# Patient Record
Sex: MALE | Race: WHITE | Employment: OTHER | URBAN - METROPOLITAN AREA
[De-identification: names, ages, dates, MRNs, and addresses within clinical notes are randomized per-mention and may not be internally consistent; named-entity substitution may affect disease eponyms.]

---

## 2017-03-06 ENCOUNTER — APPOINTMENT (OUTPATIENT)
Dept: GENERAL RADIOLOGY | Age: 62
End: 2017-03-06
Payer: MEDICARE

## 2017-03-06 ENCOUNTER — HOSPITAL ENCOUNTER (EMERGENCY)
Age: 62
Discharge: HOME | End: 2017-03-06
Attending: EMERGENCY MEDICINE
Payer: MEDICARE

## 2017-03-06 VITALS
OXYGEN SATURATION: 100 % | HEIGHT: 72 IN | RESPIRATION RATE: 18 BRPM | TEMPERATURE: 97.8 F | HEART RATE: 87 BPM | BODY MASS INDEX: 22.1 KG/M2 | DIASTOLIC BLOOD PRESSURE: 103 MMHG | SYSTOLIC BLOOD PRESSURE: 175 MMHG | WEIGHT: 163.14 LBS

## 2017-03-06 DIAGNOSIS — M54.50 CHRONIC LOW BACK PAIN WITHOUT SCIATICA, UNSPECIFIED BACK PAIN LATERALITY: ICD-10-CM

## 2017-03-06 DIAGNOSIS — K59.00 CONSTIPATION, UNSPECIFIED CONSTIPATION TYPE: Primary | ICD-10-CM

## 2017-03-06 DIAGNOSIS — N21.0 BLADDER STONES: ICD-10-CM

## 2017-03-06 DIAGNOSIS — G89.29 CHRONIC LOW BACK PAIN WITHOUT SCIATICA, UNSPECIFIED BACK PAIN LATERALITY: ICD-10-CM

## 2017-03-06 LAB
ALBUMIN SERPL BCP-MCNC: 3.5 G/DL (ref 3.2–4.6)
ALBUMIN/GLOB SERPL: 0.7 {RATIO} (ref 1.2–3.5)
ALP SERPL-CCNC: 91 U/L (ref 50–136)
ALT SERPL-CCNC: 25 U/L (ref 12–65)
ANION GAP BLD CALC-SCNC: 8 MMOL/L (ref 7–16)
AST SERPL W P-5'-P-CCNC: 17 U/L (ref 15–37)
BASOPHILS # BLD AUTO: 0 K/UL (ref 0–0.2)
BASOPHILS # BLD: 0 % (ref 0–2)
BILIRUB SERPL-MCNC: 0.4 MG/DL (ref 0.2–1.1)
BUN SERPL-MCNC: 13 MG/DL (ref 8–23)
CALCIUM SERPL-MCNC: 9.2 MG/DL (ref 8.3–10.4)
CHLORIDE SERPL-SCNC: 104 MMOL/L (ref 98–107)
CO2 SERPL-SCNC: 29 MMOL/L (ref 21–32)
CREAT SERPL-MCNC: 1.04 MG/DL (ref 0.8–1.5)
DIFFERENTIAL METHOD BLD: ABNORMAL
EOSINOPHIL # BLD: 0.2 K/UL (ref 0–0.8)
EOSINOPHIL NFR BLD: 2 % (ref 0.5–7.8)
ERYTHROCYTE [DISTWIDTH] IN BLOOD BY AUTOMATED COUNT: 13.5 % (ref 11.9–14.6)
GLOBULIN SER CALC-MCNC: 4.7 G/DL (ref 2.3–3.5)
GLUCOSE SERPL-MCNC: 99 MG/DL (ref 65–100)
HCT VFR BLD AUTO: 44.6 % (ref 41.1–50.3)
HGB BLD-MCNC: 14.3 G/DL (ref 13.6–17.2)
IMM GRANULOCYTES # BLD: 0 K/UL (ref 0–0.5)
IMM GRANULOCYTES NFR BLD AUTO: 0.1 % (ref 0–5)
LIPASE SERPL-CCNC: 239 U/L (ref 73–393)
LYMPHOCYTES # BLD AUTO: 17 % (ref 13–44)
LYMPHOCYTES # BLD: 1.4 K/UL (ref 0.5–4.6)
MCH RBC QN AUTO: 27.7 PG (ref 26.1–32.9)
MCHC RBC AUTO-ENTMCNC: 32.1 G/DL (ref 31.4–35)
MCV RBC AUTO: 86.4 FL (ref 79.6–97.8)
MONOCYTES # BLD: 1.1 K/UL (ref 0.1–1.3)
MONOCYTES NFR BLD AUTO: 14 % (ref 4–12)
NEUTS SEG # BLD: 5.4 K/UL (ref 1.7–8.2)
NEUTS SEG NFR BLD AUTO: 67 % (ref 43–78)
PLATELET # BLD AUTO: 343 K/UL (ref 150–450)
PMV BLD AUTO: 9.7 FL (ref 10.8–14.1)
POTASSIUM SERPL-SCNC: 4.2 MMOL/L (ref 3.5–5.1)
PROT SERPL-MCNC: 8.2 G/DL (ref 6.3–8.2)
RBC # BLD AUTO: 5.16 M/UL (ref 4.23–5.67)
SODIUM SERPL-SCNC: 141 MMOL/L (ref 136–145)
WBC # BLD AUTO: 8 K/UL (ref 4.3–11.1)

## 2017-03-06 PROCEDURE — 80053 COMPREHEN METABOLIC PANEL: CPT

## 2017-03-06 PROCEDURE — 83690 ASSAY OF LIPASE: CPT

## 2017-03-06 PROCEDURE — 99284 EMERGENCY DEPT VISIT MOD MDM: CPT | Performed by: EMERGENCY MEDICINE

## 2017-03-06 PROCEDURE — 85025 COMPLETE CBC W/AUTO DIFF WBC: CPT

## 2017-03-06 PROCEDURE — 74011250637 HC RX REV CODE- 250/637: Performed by: EMERGENCY MEDICINE

## 2017-03-06 PROCEDURE — 74022 RADEX COMPL AQT ABD SERIES: CPT

## 2017-03-06 RX ORDER — DICLOFENAC SODIUM 75 MG/1
75 TABLET, DELAYED RELEASE ORAL 2 TIMES DAILY
Qty: 20 TAB | Refills: 0 | Status: SHIPPED | OUTPATIENT
Start: 2017-03-06 | End: 2017-05-19

## 2017-03-06 RX ORDER — POLYETHYLENE GLYCOL 3350 17 G/17G
17 POWDER, FOR SOLUTION ORAL DAILY
Qty: 595 G | Refills: 0 | Status: SHIPPED | OUTPATIENT
Start: 2017-03-06 | End: 2017-05-19

## 2017-03-06 RX ORDER — MAGNESIUM CITRATE
296 SOLUTION, ORAL ORAL
Status: COMPLETED | OUTPATIENT
Start: 2017-03-06 | End: 2017-03-06

## 2017-03-06 RX ORDER — CYCLOBENZAPRINE HCL 10 MG
10 TABLET ORAL
Qty: 30 TAB | Refills: 0 | Status: SHIPPED | OUTPATIENT
Start: 2017-03-06 | End: 2017-05-19

## 2017-03-06 RX ORDER — HYOSCYAMINE SULFATE 0.12 MG/1
0.12 TABLET SUBLINGUAL
Status: DISCONTINUED | OUTPATIENT
Start: 2017-03-06 | End: 2017-03-06

## 2017-03-06 RX ORDER — HYDROCODONE BITARTRATE AND ACETAMINOPHEN 5; 325 MG/1; MG/1
1 TABLET ORAL
Status: COMPLETED | OUTPATIENT
Start: 2017-03-06 | End: 2017-03-06

## 2017-03-06 RX ADMIN — HYDROCODONE BITARTRATE AND ACETAMINOPHEN 1 TABLET: 5; 325 TABLET ORAL at 08:05

## 2017-03-06 RX ADMIN — MAGESIUM CITRATE 296 ML: 1.75 LIQUID ORAL at 08:05

## 2017-03-06 NOTE — DISCHARGE INSTRUCTIONS
Learning About How to Have a Healthy Back  What causes back pain? Back pain is often caused by overuse, strain, or injury. For example, people often hurt their backs playing sports or working in the yard, being jolted in a car accident, or lifting something too heavy. Aging plays a part too. Your bones and muscles tend to lose strength as you age, which makes injury more likely. The spongy discs between the bones of the spine (vertebrae) may suffer from wear and tear and no longer provide enough cushion between the bones. A disc that bulges or breaks open (herniated disc) can press on nerves, causing back pain. In some people, back pain is the result of arthritis, broken vertebrae caused by bone loss (osteoporosis), illness, or a spine problem. Although most people have back pain at one time or another, there are steps you can take to make it less likely. How can you have a healthy back? Reduce stress on your back through good posture  Slumping or slouching alone may not cause low back pain. But after the back has been strained or injured, bad posture can make pain worse. · Sleep in a position that maintains your back's normal curves and on a mattress that feels comfortable. Sleep on your side with a pillow between your knees, or sleep on your back with a pillow under your knees. These positions can reduce strain on your back. · Stand and sit up straight. \"Good posture\" generally means your ears, shoulders, and hips are in a straight line. · If you must stand for a long time, put one foot on a stool, ledge, or box. Switch feet every now and then. · Sit in a chair that is low enough to let you place both feet flat on the floor with both knees nearly level with your hips. If your chair or desk is too high, use a footrest to raise your knees. Place a small pillow, a rolled-up towel, or a lumbar roll in the curve of your back if you need extra support.   · Try a kneeling chair, which helps tilt your hips forward. This takes pressure off your lower back. · Try sitting on an exercise ball. It can rock from side to side, which helps keep your back loose. · When driving, keep your knees nearly level with your hips. Sit straight, and drive with both hands on the steering wheel. Your arms should be in a slightly bent position. Reduce stress on your back through careful lifting  · Squat down, bending at the hips and knees only. If you need to, put one knee to the floor and extend your other knee in front of you, bent at a right angle (half kneeling). · Press your chest straight forward. This helps keep your upper back straight while keeping a slight arch in your low back. · Hold the load as close to your body as possible, at the level of your belly button (navel). · Use your feet to change direction, taking small steps. · Lead with your hips as you change direction. Keep your shoulders in line with your hips as you move. · Set down your load carefully, squatting with your knees and hips only. Exercise and stretch your back  · Do some exercise on most days of the week, if your doctor says it is okay. You can walk, run, swim, or cycle. · Stretch your back muscles. Here are a few exercises to try:  José Miguel Grist on your back, and gently pull one bent knee to your chest. Put that foot back on the floor, and then pull the other knee to your chest.  ¨ Do pelvic tilts. Lie on your back with your knees bent. Tighten your stomach muscles. Pull your belly button (navel) in and up toward your ribs. You should feel like your back is pressing to the floor and your hips and pelvis are slightly lifting off the floor. Hold for 6 seconds while breathing smoothly. ¨ Sit with your back flat against a wall. · Keep your core muscles strong. The muscles of your back, belly (abdomen), and buttocks support your spine. ¨ Pull in your belly and imagine pulling your navel toward your spine. Hold this for 6 seconds, then relax.  Remember to keep breathing normally as you tense your muscles. ¨ Do curl-ups. Always do them with your knees bent. Keep your low back on the floor, and curl your shoulders toward your knees using a smooth, slow motion. Keep your arms folded across your chest. If this bothers your neck, try putting your hands behind your neck (not your head), with your elbows spread apart. ¨ Lie on your back with your knees bent and your feet flat on the floor. Tighten your belly muscles, and then push with your feet and raise your buttocks up a few inches. Hold this position 6 seconds as you continue to breathe normally, then lower yourself slowly to the floor. Repeat 8 to 12 times. ¨ If you like group exercise, try Pilates or yoga. These classes have poses that strengthen the core muscles. Lead a healthy lifestyle  · Stay at a healthy weight to avoid strain on your back. · Do not smoke. Smoking increases the risk of osteoporosis, which weakens the spine. If you need help quitting, talk to your doctor about stop-smoking programs and medicines. These can increase your chances of quitting for good. Where can you learn more? Go to http://joseBirdboxjason.info/. Enter L315 in the search box to learn more about \"Learning About How to Have a Healthy Back. \"  Current as of: May 23, 2016  Content Version: 11.1  © 7997-0531 "[employer]. Care instructions adapted under license by [employer] (which disclaims liability or warranty for this information). If you have questions about a medical condition or this instruction, always ask your healthcare professional. [place] [address] any warranty or liability for your use of this information. Chronic Pain: Care Instructions  Your Care Instructions  Chronic pain is pain that lasts a long time (months or even years) and may or may not have a clear cause.  It is different from acute pain, which usually does have a clear cause--like an injury or illness--and gets better over time. Chronic pain:  · Lasts over time but may vary from day to day. · Does not go away despite efforts to end it. · May disrupt your sleep and lead to fatigue. · May cause depression or anxiety. · May make your muscles tense, causing more pain. · Can disrupt your work, hobbies, home life, and relationships with friends and family. Chronic pain is a very real condition. It is not just in your head. Treatment can help and usually includes several methods used together, such as medicines, physical therapy, exercise, and other treatments. Learning how to relax and changing negative thought patterns can also help you cope. Chronic pain is complex. Taking an active role in your treatment will help you better manage your pain. Tell your doctor if you have trouble dealing with your pain. You may have to try several things before you find what works best for you. Follow-up care is a key part of your treatment and safety. Be sure to make and go to all appointments, and call your doctor if you are having problems. Its also a good idea to know your test results and keep a list of the medicines you take. How can you care for yourself at home? · Pace yourself. Break up large jobs into smaller tasks. Save harder tasks for days when you have less pain, or go back and forth between hard tasks and easier ones. Take rest breaks. · Relax, and reduce stress. Relaxation techniques such as deep breathing or meditation can help. · Keep moving. Gentle, daily exercise can help reduce pain over the long run. Try low- or no-impact exercises such as walking, swimming, and stationary biking. Do stretches to stay flexible. · Try heat, cold packs, and massage. · Get enough sleep. Chronic pain can make you tired and drain your energy. Talk with your doctor if you have trouble sleeping because of pain. · Think positive. Your thoughts can affect your pain level.  Do things that you enjoy to distract yourself when you have pain instead of focusing on the pain. See a movie, read a book, listen to music, or spend time with a friend. · If you think you are depressed, talk to your doctor about treatment. · Keep a daily pain diary. Record how your moods, thoughts, sleep patterns, activities, and medicine affect your pain. You may find that your pain is worse during or after certain activities or when you are feeling a certain emotion. Having a record of your pain can help you and your doctor find the best ways to treat your pain. · Take pain medicines exactly as directed. ¨ If the doctor gave you a prescription medicine for pain, take it as prescribed. ¨ If you are not taking a prescription pain medicine, ask your doctor if you can take an over-the-counter medicine. Reducing constipation caused by pain medicine  · Include fruits, vegetables, beans, and whole grains in your diet each day. These foods are high in fiber. · Drink plenty of fluids, enough so that your urine is light yellow or clear like water. If you have kidney, heart, or liver disease and have to limit fluids, talk with your doctor before you increase the amount of fluids you drink. · If your doctor recommends it, get more exercise. Walking is a good choice. Bit by bit, increase the amount you walk every day. Try for at least 30 minutes on most days of the week. · Schedule time each day for a bowel movement. A daily routine may help. Take your time and do not strain when having a bowel movement. When should you call for help? Call your doctor now or seek immediate medical care if:  · Your pain gets worse or is out of control. · You feel down or blue, or you do not enjoy things like you once did. You may be depressed, which is common in people with chronic pain. Depression can be treated. · You have vomiting or cramps for more than 2 hours.   Watch closely for changes in your health, and be sure to contact your doctor if:  · You cannot sleep because of pain. · You are very worried or anxious about your pain. · You have trouble taking your pain medicine. · You have any concerns about your pain medicine. · You have trouble with bowel movements, such as:  ¨ No bowel movement in 3 days. ¨ Blood in the anal area, in your stool, or on the toilet paper. ¨ Diarrhea for more than 24 hours. Where can you learn more? Go to http://jose-jason.info/. Enter N004 in the search box to learn more about \"Chronic Pain: Care Instructions. \"  Current as of: February 19, 2016  Content Version: 11.1  © 3506-0692 [employer]. Care instructions adapted under license by [employer] (which disclaims liability or warranty for this information). If you have questions about a medical condition or this instruction, always ask your healthcare professional. [place] [address] any warranty or liability for your use of this information. Learning About Prescribed Opiate Medicine for Chronic Pain  Introduction  Opiates are medicines used to relieve moderate to severe pain. They may be used for pain that may go on for a long time, such as for cancer or arthritis. Opiates relieve pain by changing the way your body feels pain and the way you feel about pain. They don't cure a health problem. But they do help you manage the pain. Your doctor will talk with you about how they fit into your overall treatment plan. Doctors follow a strict set of rules for giving opiates to their patients who have long-term (chronic) pain. This is because opiates are powerful medicines. They can cause problems if they are not used correctly. They can also be misused. Getting a prescription for an opiate may seem hard to do. You may feel like your doctor doesn't trust you. Your doctor understands this. But the rules are the same for everyone. They help make sure that the medicine will be used safely.   What happens before you get a prescription? Your doctor may talk with you about your pain history. He or she may ask about your family history. You may have a physical exam. You may also see a pain specialist. This helps your doctor decide if an opiate is right for you. Your doctor will also review your history of opiate use. Most states have a program that tracks prescriptions. If your state does, your doctor will check it to see if you've had prescriptions for opiates before. You may have a urine test to check for opiates in your system. If you're a woman, you may have a pregnancy test. Opiates are not safe to take if you are pregnant. When your doctor prescribes the medicine, he or she will discuss your treatment plan with you. That includes the risks and benefits of opiate medicines and how much pain relief and improved ability to function you can expect. The goal is to take the lowest dose that improves your pain for the shortest amount of time. Your doctor will also talk with you about other things you can do to help relieve pain. Your doctor may suggest different medicines or other options, such as steroid injections, ice or heat, physical therapy, or ways to reduce stress. You and your doctor will talk about your responsibilities. You'll both sign a written agreement. You will agree to take your medicine exactly as your doctor tells you to. Radha De La Vega also agree to be careful with it and not to share it with others. What happens after you start to use your medicine? Regular follow-up visits to your doctor will help you and your doctor make sure that the medicine is the right treatment for your pain. At every visit, your doctor will check these things:  · Is your pain being controlled? · Are you better able to function and be active? · Are you having any side effects, like constipation, nausea, or being too sleepy? · Are the non-opiate pain control measures working? Are there other things you can try?   · Are there any signs that you are misusing your medicine? While you are taking an opiate, you may have drug tests and prescription history checks from time to time. Follow-up care is a key part of your treatment and safety. Be sure to make and go to all appointments, and call your doctor if you are having problems. It's also a good idea to know your test results and keep a list of the medicines you take. Where can you learn more? Go to http://jose-jasno.info/. Enter D374 in the search box to learn more about \"Learning About Prescribed Opiate Medicine for Chronic Pain. \"  Current as of: August 18, 2016  Content Version: 11.1  © 3890-0977 [employer]. Care instructions adapted under license by [employer] (which disclaims liability or warranty for this information). If you have questions about a medical condition or this instruction, always ask your healthcare professional. [place] [address] any warranty or liability for your use of this information. Constipation: Care Instructions  Your Care Instructions  Constipation means that you have a hard time passing stools (bowel movements). People pass stools from 3 times a day to once every 3 days. What is normal for you may be different. Constipation may occur with pain in the rectum and cramping. The pain may get worse when you try to pass stools. Sometimes there are small amounts of bright red blood on toilet paper or the surface of stools. This is because of enlarged veins near the rectum (hemorrhoids). A few changes in your diet and lifestyle may help you avoid ongoing constipation. Your doctor may also prescribe medicine to help loosen your stool. Some medicines can cause constipation. These include pain medicines and antidepressants. Tell your doctor about all the medicines you take. Your doctor may want to make a medicine change to ease your symptoms. Follow-up care is a key part of your treatment and safety.  Be sure to make and go to all appointments, and call your doctor if you are having problems. It's also a good idea to know your test results and keep a list of the medicines you take. How can you care for yourself at home? · Drink plenty of fluids, enough so that your urine is light yellow or clear like water. If you have kidney, heart, or liver disease and have to limit fluids, talk with your doctor before you increase the amount of fluids you drink. · Include high-fiber foods in your diet each day. These include fruits, vegetables, beans, and whole grains. · Get at least 30 minutes of exercise on most days of the week. Walking is a good choice. You also may want to do other activities, such as running, swimming, cycling, or playing tennis or team sports. · Take a fiber supplement, such as Citrucel or Metamucil, every day. Read and follow all instructions on the label. · Schedule time each day for a bowel movement. A daily routine may help. Take your time having your bowel movement. · Support your feet with a small step stool when you sit on the toilet. This helps flex your hips and places your pelvis in a squatting position. · Your doctor may recommend an over-the-counter laxative to relieve your constipation. Examples are Milk of Magnesia and MiraLax. Read and follow all instructions on the label. Do not use laxatives on a long-term basis. When should you call for help? Call your doctor now or seek immediate medical care if:  · You have new or worse belly pain. · You have new or worse nausea or vomiting. · You have blood in your stools. Watch closely for changes in your health, and be sure to contact your doctor if:  · Your constipation is getting worse. · You do not get better as expected. Where can you learn more? Go to http://jose-jason.info/. Enter 21 [id number] in the search box to learn more about \"Constipation: Care Instructions. \"  Current as of: May 27, 2016  Content Version: 11.1  © 1412-3910 [employer]orporated. Care instructions adapted under license by [employer] (which disclaims liability or warranty for this information). If you have questions about a medical condition or this instruction, always ask your healthcare professional. [place] [address] any warranty or liability for your use of this information.

## 2017-03-06 NOTE — ED PROVIDER NOTES
HPI Comments: Patient presents complaining of back pain and abdominal pain. Patient states he had an episode of severe cramping abdominal pain 3 days ago and is been feeling constipated and has not had a bowel movement for the past 3 days. He also reports chronic low back pain for which he intermittently takes narcotic pain medications but states he is not currently taking any of those he denies any radiation of the pain to lower extremities he denies any fever or chills nausea or vomiting denies any abdominal pain currently. He has recently been treated for urinary tract infection with Keflex. Patient is a 59 y.o. male presenting with cramps. Abdominal Cramping    This is a new problem. The current episode started more than 2 days ago. The problem occurs rarely. The problem has been resolved. Associated with: constipation. The pain is located in the generalized abdominal region. The quality of the pain is cramping. The patient is experiencing no pain. Associated symptoms include constipation and back pain. Pertinent negatives include no anorexia, no fever, no belching, no diarrhea, no flatus, no hematochezia, no melena, no nausea, no vomiting, no dysuria, no frequency, no hematuria, no headaches, no arthralgias, no myalgias, no trauma and no chest pain. Nothing worsens the pain. The pain is relieved by nothing. The patient's surgical history non-contributory. Past Medical History:   Diagnosis Date    Arthritis     CAD (coronary artery disease)     pt denies; ekg dated 7-21-14 states \"normal ekg\"    GERD (gastroesophageal reflux disease)     occ    Hypertension     Smoker     approx one pack daily       Past Surgical History:   Procedure Laterality Date    HX ORTHOPAEDIC      spine; left and right hip replacement         History reviewed. No pertinent family history.     Social History     Social History    Marital status: SINGLE     Spouse name: N/A    Number of children: N/A    Years of education: N/A     Occupational History    Not on file. Social History Main Topics    Smoking status: Current Every Day Smoker     Packs/day: 1.00     Years: 1.00    Smokeless tobacco: Never Used    Alcohol use Yes      Comment: social    Drug use: Yes     Special: Marijuana, Cocaine      Comment: last use 0ne week ago    Sexual activity: Not on file     Other Topics Concern    Not on file     Social History Narrative         ALLERGIES: Review of patient's allergies indicates no known allergies. Review of Systems   Constitutional: Negative for fever. Cardiovascular: Negative for chest pain. Gastrointestinal: Positive for constipation. Negative for anorexia, diarrhea, flatus, hematochezia, melena, nausea and vomiting. Genitourinary: Negative for dysuria, frequency and hematuria. Musculoskeletal: Positive for back pain. Negative for arthralgias and myalgias. Neurological: Negative for headaches. All other systems reviewed and are negative. Vitals:    03/06/17 0715 03/06/17 0808   BP: 170/81 (!) 175/103   Pulse: 88 87   Resp: 18 18   Temp: 97.8 °F (36.6 °C)    SpO2: 99% 100%   Weight: 74 kg (163 lb 2.3 oz)    Height: 6' (1.829 m)             Physical Exam   Constitutional: He is oriented to person, place, and time. He appears well-developed and well-nourished. No distress. HENT:   Head: Normocephalic and atraumatic. Eyes: Conjunctivae and EOM are normal. Pupils are equal, round, and reactive to light. Neck: Normal range of motion. Neck supple. Cardiovascular: Normal rate, regular rhythm and normal heart sounds. Pulmonary/Chest: Effort normal and breath sounds normal.   Abdominal: Soft. He exhibits no distension. There is no tenderness. There is no rebound. Bowel sounds decreased   Musculoskeletal: Normal range of motion. Neurological: He is alert and oriented to person, place, and time. Skin: Skin is warm and dry. Psychiatric: He has a normal mood and affect.  His behavior is normal.   Nursing note and vitals reviewed.        MDM  Number of Diagnoses or Management Options  Bladder stones:   Chronic low back pain without sciatica, unspecified back pain laterality:   Constipation, unspecified constipation type:      Amount and/or Complexity of Data Reviewed  Clinical lab tests: ordered and reviewed  Tests in the radiology section of CPT®: ordered and reviewed  Independent visualization of images, tracings, or specimens: yes    Risk of Complications, Morbidity, and/or Mortality  Presenting problems: moderate  Diagnostic procedures: moderate  Management options: moderate    Patient Progress  Patient progress: stable    ED Course       Procedures

## 2017-03-06 NOTE — ED NOTES
I have reviewed discharge instructions with the patient. The patient verbalized understanding. Opportunity provided for questions and clarification. Pt ambulatory to exit with his own walker.

## 2017-05-19 ENCOUNTER — HOSPITAL ENCOUNTER (OUTPATIENT)
Dept: GENERAL RADIOLOGY | Age: 62
Discharge: HOME | End: 2017-05-19
Attending: INTERNAL MEDICINE
Payer: MEDICARE

## 2017-05-19 DIAGNOSIS — R97.20 ELEVATED PSA: Chronic | ICD-10-CM

## 2017-05-19 DIAGNOSIS — G89.29 CHRONIC MIDLINE LOW BACK PAIN, WITH SCIATICA PRESENCE UNSPECIFIED: Chronic | ICD-10-CM

## 2017-05-19 DIAGNOSIS — M54.5 CHRONIC MIDLINE LOW BACK PAIN, WITH SCIATICA PRESENCE UNSPECIFIED: Chronic | ICD-10-CM

## 2017-05-19 PROBLEM — R77.1 HYPERGLOBULINEMIA: Chronic | Status: ACTIVE | Noted: 2017-05-19

## 2017-05-19 PROBLEM — Z87.891 PERSONAL HISTORY OF NICOTINE DEPENDENCE: Chronic | Status: ACTIVE | Noted: 2017-05-19

## 2017-05-19 PROCEDURE — 72110 X-RAY EXAM L-2 SPINE 4/>VWS: CPT

## 2017-06-01 PROBLEM — G82.20 PARAPARESIS OF BOTH LOWER LIMBS (HCC): Status: ACTIVE | Noted: 2017-06-01

## 2017-06-01 PROBLEM — N21.0 BLADDER STONES: Chronic | Status: ACTIVE | Noted: 2017-06-01

## 2017-06-15 ENCOUNTER — HOSPITAL ENCOUNTER (OUTPATIENT)
Dept: MRI IMAGING | Age: 62
Discharge: HOME | End: 2017-06-15
Attending: INTERNAL MEDICINE
Payer: MEDICARE

## 2017-06-15 DIAGNOSIS — G82.20 PARAPARESIS OF BOTH LOWER LIMBS (HCC): ICD-10-CM

## 2017-06-15 PROCEDURE — 72158 MRI LUMBAR SPINE W/O & W/DYE: CPT

## 2017-06-15 PROCEDURE — 74011250636 HC RX REV CODE- 250/636: Performed by: INTERNAL MEDICINE

## 2017-06-15 PROCEDURE — A9577 INJ MULTIHANCE: HCPCS | Performed by: INTERNAL MEDICINE

## 2017-06-15 RX ORDER — SODIUM CHLORIDE 0.9 % (FLUSH) 0.9 %
10 SYRINGE (ML) INJECTION
Status: COMPLETED | OUTPATIENT
Start: 2017-06-15 | End: 2017-06-15

## 2017-06-15 RX ADMIN — GADOBENATE DIMEGLUMINE 9 ML: 529 INJECTION, SOLUTION INTRAVENOUS at 13:57

## 2017-06-15 RX ADMIN — Medication 10 ML: at 13:57

## 2017-07-19 ENCOUNTER — HOSPITAL ENCOUNTER (OUTPATIENT)
Dept: SURGERY | Age: 62
Discharge: HOME | End: 2017-07-19
Payer: MEDICARE

## 2017-07-19 VITALS
DIASTOLIC BLOOD PRESSURE: 85 MMHG | RESPIRATION RATE: 18 BRPM | TEMPERATURE: 98.6 F | HEIGHT: 72 IN | BODY MASS INDEX: 24.72 KG/M2 | SYSTOLIC BLOOD PRESSURE: 157 MMHG | OXYGEN SATURATION: 97 % | WEIGHT: 182.5 LBS | HEART RATE: 110 BPM

## 2017-07-19 LAB
ATRIAL RATE: 86 BPM
CALCULATED P AXIS, ECG09: 73 DEGREES
CALCULATED R AXIS, ECG10: 35 DEGREES
CALCULATED T AXIS, ECG11: 66 DEGREES
DIAGNOSIS, 93000: NORMAL
HGB BLD-MCNC: 15.1 G/DL (ref 13.6–17.2)
P-R INTERVAL, ECG05: 152 MS
Q-T INTERVAL, ECG07: 376 MS
QRS DURATION, ECG06: 92 MS
QTC CALCULATION (BEZET), ECG08: 449 MS
VENTRICULAR RATE, ECG03: 86 BPM

## 2017-07-19 PROCEDURE — 93005 ELECTROCARDIOGRAM TRACING: CPT | Performed by: ANESTHESIOLOGY

## 2017-07-19 PROCEDURE — 85018 HEMOGLOBIN: CPT | Performed by: ANESTHESIOLOGY

## 2017-07-19 NOTE — PERIOP NOTES
Recent Results (from the past 12 hour(s))   HEMOGLOBIN    Collection Time: 07/19/17  2:00 PM   Result Value Ref Range    HGB 15.1 13.6 - 17.2 g/dL     Reviewed.

## 2017-07-19 NOTE — PERIOP NOTES
Patient verified name, , and surgery as listed in [facility]. TYPE  CASE:2  Orders per surgeon: yes Received  Labs per surgeon:none  Labs per anesthesia protocol: Hgb, type and screen DOS : results pending  EKG  :  Needed-see note      Patient provided with handouts including guide to surgery , transfusions, pain management and hand hygiene for the family and community. Pt verbalizes understanding of all pre-op instructions . Instructed that family must be present in building at all times. Nothing to eat or drink after midnight the night prior to surgery. Two packets hibiclens and instructions given per hospital policy. Instructed patient to continue  previous medications as prescribed prior to surgery and  to take the following medications the day of surgery according to anesthesia guidelines : nexium       Original medication prescription bottles NOT visualized during patient appointment. Continue all previous medications unless otherwise directed. Instructed patient to hold  the following medications prior to surgery: none      Patient verbalized understanding of all instructions and provided all medical/health information to the best of their ability.

## 2017-07-23 ENCOUNTER — ANESTHESIA EVENT (OUTPATIENT)
Dept: SURGERY | Age: 62
DRG: 714 | End: 2017-07-23
Payer: MEDICARE

## 2017-07-24 ENCOUNTER — HOSPITAL ENCOUNTER (INPATIENT)
Age: 62
LOS: 1 days | Discharge: HOME | DRG: 714 | End: 2017-07-27
Attending: UROLOGY | Admitting: UROLOGY
Payer: MEDICARE

## 2017-07-24 ENCOUNTER — ANESTHESIA (OUTPATIENT)
Dept: SURGERY | Age: 62
DRG: 714 | End: 2017-07-24
Payer: MEDICARE

## 2017-07-24 PROBLEM — N40.0 BPH (BENIGN PROSTATIC HYPERPLASIA): Status: ACTIVE | Noted: 2017-07-24

## 2017-07-24 PROBLEM — N21.0 BLADDER CALCULI: Status: ACTIVE | Noted: 2017-07-24

## 2017-07-24 LAB
ABO + RH BLD: NORMAL
BLOOD GROUP ANTIBODIES SERPL: NORMAL
SPECIMEN EXP DATE BLD: NORMAL

## 2017-07-24 PROCEDURE — 74011250637 HC RX REV CODE- 250/637: Performed by: UROLOGY

## 2017-07-24 PROCEDURE — 77030022427 HC ELECTRD RESCTCS CT STOR -C: Performed by: UROLOGY

## 2017-07-24 PROCEDURE — 74011000250 HC RX REV CODE- 250

## 2017-07-24 PROCEDURE — 77030010545: Performed by: UROLOGY

## 2017-07-24 PROCEDURE — 82355 CALCULUS ANALYSIS QUAL: CPT | Performed by: UROLOGY

## 2017-07-24 PROCEDURE — 76210000006 HC OR PH I REC 0.5 TO 1 HR: Performed by: UROLOGY

## 2017-07-24 PROCEDURE — 0TCB8ZZ EXTIRPATION OF MATTER FROM BLADDER, VIA NATURAL OR ARTIFICIAL OPENING ENDOSCOPIC: ICD-10-PCS | Performed by: UROLOGY

## 2017-07-24 PROCEDURE — 77030027138 HC INCENT SPIROMETER -A

## 2017-07-24 PROCEDURE — 76010000132 HC OR TIME 2.5 TO 3 HR: Performed by: UROLOGY

## 2017-07-24 PROCEDURE — 88305 TISSUE EXAM BY PATHOLOGIST: CPT | Performed by: UROLOGY

## 2017-07-24 PROCEDURE — 0VB08ZZ EXCISION OF PROSTATE, VIA NATURAL OR ARTIFICIAL OPENING ENDOSCOPIC: ICD-10-PCS | Performed by: UROLOGY

## 2017-07-24 PROCEDURE — 76060000036 HC ANESTHESIA 2.5 TO 3 HR: Performed by: UROLOGY

## 2017-07-24 PROCEDURE — 99218 HC RM OBSERVATION: CPT

## 2017-07-24 PROCEDURE — 77030005546 HC CATH URETH FOL 3W BARD -A: Performed by: UROLOGY

## 2017-07-24 PROCEDURE — 74011250636 HC RX REV CODE- 250/636: Performed by: ANESTHESIOLOGY

## 2017-07-24 PROCEDURE — 77030018830 HC SOL IRR GLYC ICUM-A: Performed by: UROLOGY

## 2017-07-24 PROCEDURE — 77030033648: Performed by: UROLOGY

## 2017-07-24 PROCEDURE — 74011250636 HC RX REV CODE- 250/636: Performed by: UROLOGY

## 2017-07-24 PROCEDURE — 77030011640 HC PAD GRND REM COVD -A: Performed by: UROLOGY

## 2017-07-24 PROCEDURE — 77030007880 HC KT SPN EPDRL BBMI -B: Performed by: ANESTHESIOLOGY

## 2017-07-24 PROCEDURE — 74011000258 HC RX REV CODE- 258: Performed by: UROLOGY

## 2017-07-24 PROCEDURE — 77030003665 HC NDL SPN BBMI -A: Performed by: ANESTHESIOLOGY

## 2017-07-24 PROCEDURE — 77030020782 HC GWN BAIR PAWS FLX 3M -B: Performed by: ANESTHESIOLOGY

## 2017-07-24 PROCEDURE — 74011250636 HC RX REV CODE- 250/636

## 2017-07-24 PROCEDURE — 74011250637 HC RX REV CODE- 250/637: Performed by: ANESTHESIOLOGY

## 2017-07-24 PROCEDURE — 77030019927 HC TBNG IRR CYSTO BAXT -A: Performed by: UROLOGY

## 2017-07-24 PROCEDURE — 86900 BLOOD TYPING SEROLOGIC ABO: CPT | Performed by: ANESTHESIOLOGY

## 2017-07-24 RX ORDER — MIDAZOLAM HYDROCHLORIDE 1 MG/ML
2 INJECTION, SOLUTION INTRAMUSCULAR; INTRAVENOUS
Status: DISCONTINUED | OUTPATIENT
Start: 2017-07-24 | End: 2017-07-24 | Stop reason: HOSPADM

## 2017-07-24 RX ORDER — SODIUM CHLORIDE 0.9 % (FLUSH) 0.9 %
5-10 SYRINGE (ML) INJECTION EVERY 8 HOURS
Status: DISCONTINUED | OUTPATIENT
Start: 2017-07-24 | End: 2017-07-27 | Stop reason: HOSPADM

## 2017-07-24 RX ORDER — FENTANYL CITRATE 50 UG/ML
100 INJECTION, SOLUTION INTRAMUSCULAR; INTRAVENOUS ONCE
Status: DISCONTINUED | OUTPATIENT
Start: 2017-07-24 | End: 2017-07-24 | Stop reason: HOSPADM

## 2017-07-24 RX ORDER — HYDROMORPHONE HYDROCHLORIDE 1 MG/ML
1 INJECTION, SOLUTION INTRAMUSCULAR; INTRAVENOUS; SUBCUTANEOUS
Status: DISCONTINUED | OUTPATIENT
Start: 2017-07-24 | End: 2017-07-27 | Stop reason: HOSPADM

## 2017-07-24 RX ORDER — BUPIVACAINE HYDROCHLORIDE 7.5 MG/ML
INJECTION, SOLUTION INTRASPINAL AS NEEDED
Status: DISCONTINUED | OUTPATIENT
Start: 2017-07-24 | End: 2017-07-24 | Stop reason: HOSPADM

## 2017-07-24 RX ORDER — SODIUM CHLORIDE 0.9 % (FLUSH) 0.9 %
5-10 SYRINGE (ML) INJECTION AS NEEDED
Status: DISCONTINUED | OUTPATIENT
Start: 2017-07-24 | End: 2017-07-27 | Stop reason: HOSPADM

## 2017-07-24 RX ORDER — SODIUM CHLORIDE, SODIUM LACTATE, POTASSIUM CHLORIDE, CALCIUM CHLORIDE 600; 310; 30; 20 MG/100ML; MG/100ML; MG/100ML; MG/100ML
150 INJECTION, SOLUTION INTRAVENOUS CONTINUOUS
Status: DISCONTINUED | OUTPATIENT
Start: 2017-07-24 | End: 2017-07-24 | Stop reason: HOSPADM

## 2017-07-24 RX ORDER — CEFAZOLIN SODIUM IN 0.9 % NACL 2 G/50 ML
2 INTRAVENOUS SOLUTION, PIGGYBACK (ML) INTRAVENOUS ONCE
Status: COMPLETED | OUTPATIENT
Start: 2017-07-24 | End: 2017-07-24

## 2017-07-24 RX ORDER — HYDROCODONE BITARTRATE AND ACETAMINOPHEN 5; 325 MG/1; MG/1
1 TABLET ORAL AS NEEDED
Status: DISCONTINUED | OUTPATIENT
Start: 2017-07-24 | End: 2017-07-24 | Stop reason: HOSPADM

## 2017-07-24 RX ORDER — FAMOTIDINE 20 MG/1
20 TABLET, FILM COATED ORAL ONCE
Status: COMPLETED | OUTPATIENT
Start: 2017-07-24 | End: 2017-07-24

## 2017-07-24 RX ORDER — LIDOCAINE HYDROCHLORIDE 10 MG/ML
0.1 INJECTION INFILTRATION; PERINEURAL AS NEEDED
Status: DISCONTINUED | OUTPATIENT
Start: 2017-07-24 | End: 2017-07-24 | Stop reason: HOSPADM

## 2017-07-24 RX ORDER — NALOXONE HYDROCHLORIDE 0.4 MG/ML
0.4 INJECTION, SOLUTION INTRAMUSCULAR; INTRAVENOUS; SUBCUTANEOUS AS NEEDED
Status: DISCONTINUED | OUTPATIENT
Start: 2017-07-24 | End: 2017-07-27 | Stop reason: HOSPADM

## 2017-07-24 RX ORDER — SODIUM CHLORIDE 9 MG/ML
50 INJECTION, SOLUTION INTRAVENOUS CONTINUOUS
Status: DISCONTINUED | OUTPATIENT
Start: 2017-07-24 | End: 2017-07-24 | Stop reason: HOSPADM

## 2017-07-24 RX ORDER — ONDANSETRON 2 MG/ML
4 INJECTION INTRAMUSCULAR; INTRAVENOUS
Status: DISCONTINUED | OUTPATIENT
Start: 2017-07-24 | End: 2017-07-27 | Stop reason: HOSPADM

## 2017-07-24 RX ORDER — ACETAMINOPHEN 325 MG/1
650 TABLET ORAL
Status: DISCONTINUED | OUTPATIENT
Start: 2017-07-24 | End: 2017-07-27 | Stop reason: HOSPADM

## 2017-07-24 RX ORDER — PROPOFOL 10 MG/ML
INJECTION, EMULSION INTRAVENOUS
Status: DISCONTINUED | OUTPATIENT
Start: 2017-07-24 | End: 2017-07-24 | Stop reason: HOSPADM

## 2017-07-24 RX ORDER — HYDROMORPHONE HYDROCHLORIDE 2 MG/ML
0.5 INJECTION, SOLUTION INTRAMUSCULAR; INTRAVENOUS; SUBCUTANEOUS
Status: DISCONTINUED | OUTPATIENT
Start: 2017-07-24 | End: 2017-07-24 | Stop reason: HOSPADM

## 2017-07-24 RX ORDER — PROPOFOL 10 MG/ML
INJECTION, EMULSION INTRAVENOUS AS NEEDED
Status: DISCONTINUED | OUTPATIENT
Start: 2017-07-24 | End: 2017-07-24 | Stop reason: HOSPADM

## 2017-07-24 RX ORDER — PANTOPRAZOLE SODIUM 40 MG/1
40 TABLET, DELAYED RELEASE ORAL
Status: DISCONTINUED | OUTPATIENT
Start: 2017-07-25 | End: 2017-07-27 | Stop reason: HOSPADM

## 2017-07-24 RX ORDER — HYDROCODONE BITARTRATE AND ACETAMINOPHEN 5; 325 MG/1; MG/1
1 TABLET ORAL
Status: DISCONTINUED | OUTPATIENT
Start: 2017-07-24 | End: 2017-07-27 | Stop reason: HOSPADM

## 2017-07-24 RX ORDER — ACETAMINOPHEN 500 MG
1000 TABLET ORAL
Status: DISCONTINUED | OUTPATIENT
Start: 2017-07-24 | End: 2017-07-24 | Stop reason: HOSPADM

## 2017-07-24 RX ORDER — TETRACAINE HCL 10 MG/ML
INJECTION SUBARACHNOID AS NEEDED
Status: DISCONTINUED | OUTPATIENT
Start: 2017-07-24 | End: 2017-07-24 | Stop reason: HOSPADM

## 2017-07-24 RX ORDER — SODIUM CHLORIDE 0.9 % (FLUSH) 0.9 %
5-10 SYRINGE (ML) INJECTION EVERY 8 HOURS
Status: DISCONTINUED | OUTPATIENT
Start: 2017-07-24 | End: 2017-07-24 | Stop reason: HOSPADM

## 2017-07-24 RX ORDER — SODIUM CHLORIDE 0.9 % (FLUSH) 0.9 %
5-10 SYRINGE (ML) INJECTION AS NEEDED
Status: DISCONTINUED | OUTPATIENT
Start: 2017-07-24 | End: 2017-07-24 | Stop reason: HOSPADM

## 2017-07-24 RX ORDER — LIDOCAINE HYDROCHLORIDE 20 MG/ML
INJECTION, SOLUTION EPIDURAL; INFILTRATION; INTRACAUDAL; PERINEURAL AS NEEDED
Status: DISCONTINUED | OUTPATIENT
Start: 2017-07-24 | End: 2017-07-24 | Stop reason: HOSPADM

## 2017-07-24 RX ORDER — DEXTROSE MONOHYDRATE AND SODIUM CHLORIDE 5; .45 G/100ML; G/100ML
100 INJECTION, SOLUTION INTRAVENOUS CONTINUOUS
Status: DISPENSED | OUTPATIENT
Start: 2017-07-24 | End: 2017-07-25

## 2017-07-24 RX ADMIN — CEFAZOLIN 2 G: 1 INJECTION, POWDER, FOR SOLUTION INTRAMUSCULAR; INTRAVENOUS; PARENTERAL at 16:29

## 2017-07-24 RX ADMIN — PROPOFOL 20 MG: 10 INJECTION, EMULSION INTRAVENOUS at 18:28

## 2017-07-24 RX ADMIN — TETRACAINE HCL 4 MG: 10 INJECTION SUBARACHNOID at 16:39

## 2017-07-24 RX ADMIN — SODIUM CHLORIDE, SODIUM LACTATE, POTASSIUM CHLORIDE, AND CALCIUM CHLORIDE: 600; 310; 30; 20 INJECTION, SOLUTION INTRAVENOUS at 17:29

## 2017-07-24 RX ADMIN — LIDOCAINE HYDROCHLORIDE 20 MG: 20 INJECTION, SOLUTION EPIDURAL; INFILTRATION; INTRACAUDAL; PERINEURAL at 16:51

## 2017-07-24 RX ADMIN — PROPOFOL 20 MG: 10 INJECTION, EMULSION INTRAVENOUS at 16:51

## 2017-07-24 RX ADMIN — PROPOFOL 20 MG: 10 INJECTION, EMULSION INTRAVENOUS at 16:52

## 2017-07-24 RX ADMIN — DEXTROSE MONOHYDRATE AND SODIUM CHLORIDE 100 ML/HR: 5; .45 INJECTION, SOLUTION INTRAVENOUS at 22:11

## 2017-07-24 RX ADMIN — PROPOFOL 20 MG: 10 INJECTION, EMULSION INTRAVENOUS at 16:53

## 2017-07-24 RX ADMIN — SODIUM CHLORIDE, SODIUM LACTATE, POTASSIUM CHLORIDE, AND CALCIUM CHLORIDE 150 ML/HR: 600; 310; 30; 20 INJECTION, SOLUTION INTRAVENOUS at 14:03

## 2017-07-24 RX ADMIN — LIDOCAINE HYDROCHLORIDE 30 MG: 20 INJECTION, SOLUTION EPIDURAL; INFILTRATION; INTRACAUDAL; PERINEURAL at 16:28

## 2017-07-24 RX ADMIN — HYDROCODONE BITARTRATE AND ACETAMINOPHEN 1 TABLET: 5; 325 TABLET ORAL at 22:25

## 2017-07-24 RX ADMIN — FAMOTIDINE 20 MG: 20 TABLET ORAL at 14:03

## 2017-07-24 RX ADMIN — BUPIVACAINE HYDROCHLORIDE 1.2 ML: 7.5 INJECTION, SOLUTION INTRASPINAL at 16:39

## 2017-07-24 RX ADMIN — Medication 5 ML: at 22:22

## 2017-07-24 RX ADMIN — PROPOFOL 10 MG: 10 INJECTION, EMULSION INTRAVENOUS at 16:28

## 2017-07-24 RX ADMIN — PROPOFOL 120 MCG/KG/MIN: 10 INJECTION, EMULSION INTRAVENOUS at 16:38

## 2017-07-24 RX ADMIN — PROPOFOL 20 MG: 10 INJECTION, EMULSION INTRAVENOUS at 16:49

## 2017-07-24 NOTE — ANESTHESIA PROCEDURE NOTES
Spinal Block    Performed by: Joanie Florian  Authorized by: Joanie Florian     Pre-procedure:   Indications: primary anesthetic  Preanesthetic Checklist: patient identified, risks and benefits discussed, anesthesia consent, patient being monitored and timeout performed    Timeout Time: 16:34          Spinal Block:   Patient Position:  Seated  Prep Region:  Lumbar  Prep: chlorhexidine      Location:  L3-4  Technique:  Single shot  Local:  Lidocaine 1%  Local Dose (mL):  3    Needle:   Needle Type:  Pencan  Needle Gauge:  25 G  Attempts:  1      Events: CSF confirmed, no blood with aspiration and no paresthesia        Assessment:  Insertion:  Uncomplicated  Patient tolerance:  Patient tolerated the procedure well with no immediate complications  All needles out intact, procedure tolerated well without problems

## 2017-07-24 NOTE — BRIEF OP NOTE
BRIEF OPERATIVE NOTE    Date of Procedure: 7/24/2017   Preoperative Diagnosis: Bladder stone [N21.0]  Benign nodular prostatic hyperplasia with lower urinary tract symptoms [N40.1]  Postoperative Diagnosis: Bladder stone [N21.0]  Benign nodular prostatic hyperplasia with lower urinary tract symptoms [N40.1]    Procedure(s):  CYSTOSCOPY TRANSURETHRAL RESECTION OF PROSTATE  CYSTO LITHOLAPAXY  Surgeon(s) and Role:     * Austin Boudreaux MD - Primary         Assistant Staff:       Surgical Staff:  Circ-1: Sirisha Francisco RN  Event Time In   Incision Start 1654   Incision Close 1853     Anesthesia: General   Estimated Blood Loss: 100 ml  Specimens:   ID Type Source Tests Collected by Time Destination   1 : Prostate pieces and bladder stones Preservative Prostate  Austin Boudreaux MD 7/24/2017 1853 Pathology      Findings: see op note   Complications: none  Implants: * No implants in log *

## 2017-07-24 NOTE — ANESTHESIA PREPROCEDURE EVALUATION
Anesthetic History   No history of anesthetic complications            Review of Systems / Medical History  Patient summary reviewed, nursing notes reviewed and pertinent labs reviewed    Pulmonary    COPD: mild      Smoker         Neuro/Psych   Within defined limits           Cardiovascular    Hypertension              Exercise tolerance: >4 METS     GI/Hepatic/Renal     GERD: well controlled          Comments: bph.  Endo/Other        Arthritis     Other Findings              Physical Exam    Airway  Mallampati: II  TM Distance: > 6 cm  Neck ROM: normal range of motion   Mouth opening: Normal     Cardiovascular  Regular rate and rhythm,  S1 and S2 normal,  no murmur, click, rub, or gallop  Rhythm: regular  Rate: normal         Dental    Dentition: Poor dentition     Pulmonary  Breath sounds clear to auscultation               Abdominal         Other Findings            Anesthetic Plan    ASA: 2  Anesthesia type: spinal            Anesthetic plan and risks discussed with: Patient and Spouse

## 2017-07-24 NOTE — IP AVS SNAPSHOT
Adieddy Marizamalathi 
 
 
 [address] [facility] [address] [facility] 
[phone] Patient: Kely Chapin MRN: [id number] AWR:[birth year] You are allergic to the following No active allergies Recent Documentation Height Weight BMI Smoking Status 1.829 m 82.9 kg 24.79 kg/m2 Current Every Day Smoker Emergency Contacts Name Discharge Info Relation Home Work Mobile [facility] AUTHORITY DISCHARGE CAREGIVER [3] Brother [24] [phone] 155 Sammy Martinez CAREGIVER [3] Friend [5] [phone] About your hospitalization You were admitted on:  July 24, 2017 You last received care in the:  [facility] 2 SURGICAL You were discharged on:  July 27, 2017 Unit phone number:  [phone] Why you were hospitalized Your primary diagnosis was:  Bladder Calculi Your diagnoses also included:  Bph (Benign Prostatic Hyperplasia), Postoperative Fever, Fever Providers Seen During Your Hospitalizations Provider Role Specialty Primary office phone Laureano Galarza MD Attending Provider Urology [phone] Your Primary Care Physician (PCP) Primary Care Physician Office Phone Office Fax [address] [facility] [phone] Follow-up Information Follow up With Details Comments Contact Info Janey Ly MD   [facility] [address] [facility] [place] [address] 
[phone] Laureano Galarza MD On 8/11/2017 1:30 [address] [address] [phone] Your Appointments Friday August 11, 2017  1:30 PM EDT Office Visit with Sid Green NP [facility] Urology 52 ([facility] UROLOGY) [address] [address] [phone] Current Discharge Medication List  
  
START taking these medications Dose & Instructions Dispensing Information Comments Morning Noon Evening Bedtime HYDROcodone-acetaminophen 5-325 mg per tablet Commonly known as:  Iggy Lynn Notes to Patient:  Take on as needed schedule Dose:  1-2 Tab Take 1-2 Tabs by mouth every four (4) hours as needed for Pain. Max Daily Amount: 12 Tabs. Quantity:  25 Tab Refills:  0  
     
   
   
   
  
 trimethoprim-sulfamethoxazole 160-800 mg per tablet Commonly known as:  BACTRIM DS Dose:  1 Tab Take 1 Tab by mouth two (2) times a day for 7 days. Quantity:  14 Tab Refills:  0 CONTINUE these medications which have NOT CHANGED Dose & Instructions Dispensing Information Comments Morning Noon Evening Bedtime NexIUM 20 mg capsule Generic drug:  esomeprazole Notes to Patient:  As prior to being admitted to hospital  
   
 Dose:  20 mg Take 20 mg by mouth as needed. Take day of surgery per anesthesia protocol. Refills:  0 Where to Get Your Medications These medications were sent to [address] [place] [address] Christopher Grande [address] [place] [address] RD AT [address] [address] Phone:  [phone]  
  trimethoprim-sulfamethoxazole 160-800 mg per tablet Information on where to get these meds will be given to you by the nurse or doctor. ! Ask your nurse or doctor about these medications HYDROcodone-acetaminophen 5-325 mg per tablet Discharge Instructions DISCHARGE SUMMARY from Nurse The following personal items are in your possession at time of discharge: 
 
Dental Appliances: None Visual Aid: Glasses Home Medications: None Jewelry: None Clothing: Footwear, Pants, Shirt, Undergarments Other Valuables: None PATIENT INSTRUCTIONS: 
 
After general anesthesia or intravenous sedation, for 24 hours or while taking prescription Narcotics: · Limit your activities · Do not drive and operate hazardous machinery · Do not make important personal or business decisions · Do  not drink alcoholic beverages · If you have not urinated within 8 hours after discharge, please contact your surgeon on call. Report the following to your surgeon: 
· Excessive pain, swelling, redness or odor of or around the surgical area · Temperature over 100.5 · Nausea and vomiting lasting longer than 4 hours or if unable to take medications · Any signs of decreased circulation or nerve impairment to extremity: change in color, persistent  numbness, tingling, coldness or increase pain · Any questions What to do at Home: 
Recommended activity: Activity as tolerated, per MD instructions If you experience any of the following symptoms fever > 100.5, nausea, vomiting, unable to void, bleeding with urination, pain, chest pain, and shortness of breath  please follow up with MD. 
 
 
*  Please give a list of your current medications to your Primary Care Provider. *  Please update this list whenever your medications are discontinued, doses are 
    changed, or new medications (including over-the-counter products) are added. *  Please carry medication information at all times in case of emergency situations. These are general instructions for a healthy lifestyle: No smoking/ No tobacco products/ Avoid exposure to second hand smoke Surgeon General's Warning:  Quitting smoking now greatly reduces serious risk to your health. Obesity, smoking, and sedentary lifestyle greatly increases your risk for illness A healthy diet, regular physical exercise & weight monitoring are important for maintaining a healthy lifestyle You may be retaining fluid if you have a history of heart failure or if you experience any of the following symptoms:  Weight gain of 3 pounds or more overnight or 5 pounds in a week, increased swelling in our hands or feet or shortness of breath while lying flat in bed. Please call your doctor as soon as you notice any of these symptoms; do not wait until your next office visit. Recognize signs and symptoms of STROKE: 
 
F-face looks uneven A-arms unable to move or move unevenly S-speech slurred or non-existent T-time-call 911 as soon as signs and symptoms begin-DO NOT go Back to bed or wait to see if you get better-TIME IS BRAIN. Warning Signs of HEART ATTACK Call 911 if you have these symptoms: 
? Chest discomfort. Most heart attacks involve discomfort in the center of the chest that lasts more than a few minutes, or that goes away and comes back. It can feel like uncomfortable pressure, squeezing, fullness, or pain. ? Discomfort in other areas of the upper body. Symptoms can include pain or discomfort in one or both arms, the back, neck, jaw, or stomach. ? Shortness of breath with or without chest discomfort. ? Other signs may include breaking out in a cold sweat, nausea, or lightheadedness. Don't wait more than five minutes to call [address]! Fast action can save your life. Calling 911 is almost always the fastest way to get lifesaving treatment. Emergency Medical Services staff can begin treatment when they arrive  up to an hour sooner than if someone gets to the hospital by car. The discharge information has been reviewed with the patient. The patient verbalized understanding. Discharge medications reviewed with the patient and appropriate educational materials and side effects teaching were provided. Transurethral Resection of the Prostate (TURP): What to Expect at [facility] Your Recovery Transurethral resection of the prostate (TURP) is surgery to remove prostate tissue. It is done when an overgrown prostate gland is pressing on the urethra and making it hard for a man to urinate. You may need a urinary catheter for a short time. It is a flexible plastic tube used to drain urine from your bladder when you can't urinate on your own. If it is still in place when you go home, your doctor will give you instructions on how to care for your catheter. For several days after surgery, you may feel burning when you urinate. Your urine may be pink for 1 to 3 weeks after surgery. You also may have bladder cramps, or spasms. Your doctor may give you medicine to help control the spasms. You may still feel like you need to urinate often in the weeks after your surgery. It often takes up to 6 weeks for this to get better. After you have healed, you may have less trouble urinating. You may have better control over starting and stopping your urine stream. And you may feel like you get more relief when you urinate. Most men can return to work or many of their usual tasks in 1 to 3 weeks. But for about 6 weeks, try to avoid heavy lifting and strenuous activities that might put extra pressure on your bladder. Most men still can have erections after surgery (if they were able to have them before surgery). But they may not ejaculate when they have an orgasm. Semen may go into the bladder instead of out through the penis. This is called retrograde ejaculation. It does not hurt and is not harmful to your health. This care sheet gives you a general idea about how long it will take for you to recover. But each person recovers at a different pace. Follow the steps below to get better as quickly as possible. How can you care for yourself at home? Activity · Rest when you feel tired. · Be active. Walking is a good choice. · Allow your body to heal. Don't move quickly or lift anything heavy until you are feeling better. · Ask your doctor when you can drive again. · Many people are able to return to work within 1 to 3 weeks after surgery. It depends on the type of work you do and how you feel. · Do not put anything in your rectum, such as an enema or suppository, for 4 to 6 weeks after the surgery. · You may shower and take baths when your doctor says it is okay. · Ask your doctor when it is okay for you to have sex. Diet · You can eat your normal diet. If your stomach is upset, try bland, low-fat foods like plain rice, broiled chicken, toast, and yogurt. · If your bowel movements are not regular right after surgery, try to avoid constipation and straining. Drink plenty of water. Your doctor may suggest fiber, a stool softener, or a mild laxative. Medicines · Your doctor will tell you if and when you can restart your medicines. He or she will also give you instructions about taking any new medicines. · If you take aspirin or some other blood thinner, be sure to talk to your doctor. He or she will tell you if and when to start taking those medicines again. Make sure that you understand exactly what your doctor wants you to do. · Be safe with medicines. Read and follow all instructions on the label. ¨ If the doctor gave you a prescription medicine for pain, take it as prescribed. ¨ If you are not taking a prescription pain medicine, ask your doctor if you can take an over-the-counter medicine. · Take your antibiotics as directed. Do not stop taking them just because you feel better. You need to take the full course of antibiotics. Follow-up care is a key part of your treatment and safety. Be sure to make and go to all appointments, and call your doctor if you are having problems. It's also a good idea to know your test results and keep a list of the medicines you take. When should you call for help? Call 911 anytime you think you may need emergency care. For example, call if: 
· You passed out (lost consciousness). · You have symptoms of a blood clot in your lung (called a pulmonary embolism). These may include: 
¨ Sudden chest pain. ¨ Trouble breathing. ¨ Coughing up blood. Call your doctor now or seek immediate medical care if: 
· You have symptoms of infection, such as: 
¨ Increased pain, swelling, warmth, or redness around the cut. ¨ Red streaks leading from the cut. ¨ Pus draining from the cut. ¨ A fever. · You have new or more blood or blood clots in your urine. · You have pain in the flank, which is just below the rib cage and above the waist on either side of the back. · You have new or worse pain or burning when you urinate. · You cannot urinate or have trouble urinating. · You have a new or worse problem with controlling your bladder. · You have signs of a blood clot, such as: 
¨ Pain in your calf, back of the knee, thigh, or groin. ¨ Redness and swelling in your leg or groin. Watch closely for changes in your health, and be sure to contact your doctor if: 
· You are not getting better as expected. · You do not have a bowel movement after taking a laxative. Where can you learn more? Go to http://jose-jason.info/. Enter M131 in the search box to learn more about \"Transurethral Resection of the Prostate (TURP): What to Expect at Home. \" Current as of: March 14, 2017 Content Version: 11.3 © 3091-6174 [employer]. Care instructions adapted under license by [employer] (which disclaims liability or warranty for this information). If you have questions about a medical condition or this instruction, always ask your healthcare professional. [place] [address] any warranty or liability for your use of this information. Cystoscopy: What to Expect at [facility] Your Recovery A cystoscopy is a procedure that lets a doctor look inside of the bladder and the urethra. The urethra is the tube that carries urine from the bladder to outside the body. The doctor uses a thin, lighted tool called a cystoscope. Your bladder is filled with fluid.  This stretches the bladder so that your doctor can look closely at the inside of your bladder. After the cystoscopy, your urethra may be sore at first, and it may burn when you urinate for the first few days after the procedure. You may feel the need to urinate more often, and your urine may be pink. These symptoms should get better in 1 or 2 days. You will probably be able to go back to most of your usual activities in 1 or 2 days. This care sheet gives you a general idea about how long it will take for you to recover. But each person recovers at a different pace. Follow the steps below to get better as quickly as possible. How can you care for yourself at home? Activity · Rest when you feel tired. Getting enough sleep will help you recover. · Try to walk each day. Start by walking a little more than you did the day before. Bit by bit, increase the amount you walk. Walking boosts blood flow and helps prevent pneumonia and constipation. · Avoid strenuous activities, such as bicycle riding, jogging, weight lifting, or aerobic exercise, until your doctor says it is okay. · Ask your doctor when you can drive again. · Most people are able to return to work within 1 or 2 days after the procedure. · You may shower and take baths as usual. 
· Ask your doctor when it is okay for you to have sex. Diet · You can eat your normal diet. If your stomach is upset, try bland, low-fat foods like plain rice, broiled chicken, toast, and yogurt. · Drink plenty of fluids (unless your doctor tells you not to). Medicines · Take pain medicines exactly as directed. ¨ If the doctor gave you a prescription medicine for pain, take it as prescribed. ¨ If you are not taking a prescription pain medicine, ask your doctor if you can take an over-the-counter medicine. · If you think your pain medicine is making you sick to your stomach: 
¨ Take your medicine after meals (unless your doctor has told you not to). ¨ Ask your doctor for a different pain medicine. · If your doctor prescribed antibiotics, take them as directed. Do not stop taking them just because you feel better. You need to take the full course of antibiotics. Follow-up care is a key part of your treatment and safety. Be sure to make and go to all appointments, and call your doctor if you are having problems. It's also a good idea to know your test results and keep a list of the medicines you take. When should you call for help? Call 911 anytime you think you may need emergency care. For example, call if: 
· You passed out (lost consciousness). · You have severe trouble breathing. · You have sudden chest pain and shortness of breath, or you cough up blood. · You have severe belly pain. Call your doctor now or seek immediate medical care if: 
· You are sick to your stomach or cannot keep fluids down. · Your urine is still red or you see blood clots after you have urinated several times. · You have trouble passing urine or stool, especially if you have pain or swelling in your lower belly. · You have signs of a blood clot, such as: 
¨ Pain in your calf, back of the knee, thigh, or groin. ¨ Redness and swelling in your leg or groin. · You develop a fever or severe chills. · You have pain in your back just below your rib cage. This is called flank pain. Watch closely for changes in your health, and be sure to contact your doctor if: 
· You have pain or burning when you urinate. A burning feeling is normal for a day or two after the test, but call if it does not get better. · You have a frequent urge to urinate but can pass only small amounts of urine. · Your urine is pink, red, or cloudy, or smells bad. It is normal for the urine to have a pinkish color for a few days after the test, but call if it does not get better. Where can you learn more? Go to http://jose-jason.info/. Enter W944 in the search box to learn more about \"Cystoscopy: What to Expect at Home. \" Current as of: November 11, 2016 Content Version: 11.3 © 9295-1803 [employer]. Care instructions adapted under license by [employer] (which disclaims liability or warranty for this information). If you have questions about a medical condition or this instruction, always ask your healthcare professional. [address] any warranty or liability for your use of this information. Discharge Orders None Introducing [facility]! [facility] introduces [email or web address] patient portal. Now you can access parts of your medical record, email your doctor's office, and request medication refills online. 1. In your internet browser, go to [email or web address] 2. Click on the First Time User? Click Here link in the Sign In box. You will see the New Member Sign Up page. 3. Enter your [email or web address] Access Code exactly as it appears below. You will not need to use this code after youve completed the sign-up process. If you do not sign up before the expiration date, you must request a new code. · [email or web address] Access Code: [email or web address] Expires: 9/3/2017  9:11 PM 
 
4. Enter the last four digits of your Social Security Number (xxxx) and Date of Birth (mm/dd/yyyy) as indicated and click Submit. You will be taken to the next sign-up page. 5. Create a [email or web address] ID. This will be your [email or web address] login ID and cannot be changed, so think of one that is secure and easy to remember. 6. Create a [email or web address] password. You can change your password at any time. 7. Enter your Password Reset Question and Answer. This can be used at a later time if you forget your password. 8. Enter your e-mail address. You will receive e-mail notification when new information is available in [address] 9. Click Sign Up. You can now view and download portions of your medical record. 10. Click the Download Summary menu link to download a portable copy of your medical information. If you have questions, please visit the Frequently Asked Questions section of the [employer] website. Remember, MyChart is NOT to be used for urgent needs. For medical emergencies, dial 911. Now available from your iPhone and Android! General Information Please provide this summary of care documentation to your next provider. Patient Signature:  ____________________________________________________________ Date:  ____________________________________________________________  
  
[facility] Provider Signature:  ____________________________________________________________ Date:  ____________________________________________________________

## 2017-07-25 PROCEDURE — 77030018836 HC SOL IRR NACL ICUM -A

## 2017-07-25 PROCEDURE — 77010033678 HC OXYGEN DAILY

## 2017-07-25 PROCEDURE — 74011000258 HC RX REV CODE- 258: Performed by: UROLOGY

## 2017-07-25 PROCEDURE — 74011250636 HC RX REV CODE- 250/636: Performed by: UROLOGY

## 2017-07-25 PROCEDURE — 99218 HC RM OBSERVATION: CPT

## 2017-07-25 PROCEDURE — 74011250637 HC RX REV CODE- 250/637: Performed by: UROLOGY

## 2017-07-25 PROCEDURE — 94760 N-INVAS EAR/PLS OXIMETRY 1: CPT

## 2017-07-25 PROCEDURE — 99407 BEHAV CHNG SMOKING > 10 MIN: CPT

## 2017-07-25 RX ORDER — IBUPROFEN 200 MG
1 TABLET ORAL DAILY
Status: DISCONTINUED | OUTPATIENT
Start: 2017-07-25 | End: 2017-07-27 | Stop reason: HOSPADM

## 2017-07-25 RX ADMIN — CEFAZOLIN SODIUM 1 G: 1 INJECTION, POWDER, FOR SOLUTION INTRAMUSCULAR; INTRAVENOUS at 00:00

## 2017-07-25 RX ADMIN — CEFAZOLIN SODIUM 1 G: 1 INJECTION, POWDER, FOR SOLUTION INTRAMUSCULAR; INTRAVENOUS at 08:39

## 2017-07-25 RX ADMIN — PANTOPRAZOLE SODIUM 40 MG: 40 TABLET, DELAYED RELEASE ORAL at 08:47

## 2017-07-25 RX ADMIN — DEXTROSE MONOHYDRATE AND SODIUM CHLORIDE 100 ML/HR: 5; .45 INJECTION, SOLUTION INTRAVENOUS at 08:42

## 2017-07-25 RX ADMIN — HYDROMORPHONE HYDROCHLORIDE 1 MG: 1 INJECTION, SOLUTION INTRAMUSCULAR; INTRAVENOUS; SUBCUTANEOUS at 02:23

## 2017-07-25 NOTE — PROGRESS NOTES
TRANSFER - IN REPORT:    Verbal report received from [address] [place] [address],[address], RN(name) on Lew Carrillo  being received from [facility]) for routine progression of care      Report consisted of patients Situation, Background, Assessment and   Recommendations(SBAR). Information from the following report(s) SBAR and OR Summary was reviewed with the receiving nurse. Opportunity for questions and clarification was provided. Assessment completed upon patients arrival to unit and care assumed.

## 2017-07-25 NOTE — PROGRESS NOTES
END OF SHIFT NOTE:    INTAKE/OUTPUT  07/24 0701 - 07/25 0700  In: 84403 [I.V.:1797]  Out: 19100 [Urine:19100]  Voiding: NO  Catheter: YES    Drain:  Continuous bladder irrigation going on. Flatus: Patient does have flatus present. Stool:  0 occurrences. Characteristics:       Emesis: 0 occurrences. Characteristics:        VITAL SIGNS  Patient Vitals for the past 12 hrs:   Temp Pulse Resp BP SpO2   07/25/17 0330 98 °F (36.7 °C) 100 18 123/70 95 %   07/25/17 0237 - - - - 93 %   07/25/17 0000 97 °F (36.1 °C) 96 18 162/80 92 %   07/24/17 2040 97.8 °F (36.6 °C) (!) 102 16 143/77 99 %   07/24/17 2006 - 78 16 (!) 167/99 98 %   07/24/17 2001 - 82 16 (!) 164/94 99 %   07/24/17 1956 - 79 16 (!) 155/91 98 %       Pain Assessment  Pain Intensity 1: 7 (07/25/17 0228)  Pain Location 1: Flank  Pain Intervention(s) 1: Medication (see MAR)  Patient Stated Pain Goal: 0    Ambulating  No    Shift report given to oncoming nurse at the bedside.     Martha Sewell RN

## 2017-07-25 NOTE — PROGRESS NOTES
Subjective:   Daily Progress Note: 2017 7:49 AM  No Complaints, wants nicotine patch  Objective:     Visit Vitals    /70    Pulse 100    Temp 98 °F (36.7 °C)    Resp 18    Ht 6' (1.829 m)    Wt 182 lb 12.8 oz (82.9 kg)    SpO2 95%    BMI 24.79 kg/m2    O2 Flow Rate (L/min): 3 l/min O2 Device: Nasal cannula    Temp (24hrs), Av.6 °F (36.4 °C), Min:97 °F (36.1 °C), Max:98 °F (36.7 °C)      1901 - 700  In: 60752 [I.V.:179]  Out:  [Urine:68030]       [unfilled]  [unfilled]  [unfilled]    Exam: urine light red, CBI on slow drip    Data Review    Recent Results (from the past 24 hour(s))   TYPE & SCREEN    Collection Time: 17  2:00 PM   Result Value Ref Range    Crossmatch Expiration 2017     ABO/Rh(D) Rosaura Riccardo POSITIVE     Antibody screen NEG        Assessment   Principal Problem:    Bladder calculi (2017)      Overview: Date of Procedure: 2017       Preoperative Diagnosis: Bladder stone [N21.0]      Benign nodular prostatic hyperplasia with lower urinary tract symptoms       [N40.1]      Postoperative Diagnosis: Bladder stone [N21.0]      Benign nodular prostatic hyperplasia with lower urinary tract symptoms       [N40.1]        Procedure(s):      CYSTOSCOPY TRANSURETHRAL RESECTION OF PROSTATE      CYSTO LITHOLAPAXY    Active Problems:    BPH (benign prostatic hyperplasia) (2017)        Plan:  Nicotine patch. Continue CBI.

## 2017-07-25 NOTE — OP NOTES
[facility] 63   OPERATIVE REPORT       Name:  Autumn Cruz   MR#:  [id number]   :  1955   Account #:  [de-identified]   Date of Adm:  2017       DATE OF PROCEDURE:  2017    PREOPERATIVE DIAGNOSES:   1. Bladder stones. 2. Benign prostatic hypertrophy. POSTOPERATIVE DIAGNOSES    1. Bladder stones. 2. Benign prostatic hypertrophy. NAME OF PROCEDURE: Cystoscopy and laser lithotripsy of bladder   stones, transurethral resection of prostate. SURGEON: Marivel Bell. Linda Hu MD    ANESTHESIA: General    FINDINGS: Approximately 4 larger bladder stones, largest   measures 3 cm in diameter, obstructing prostate. DESCRIPTION OF PROCEDURE: The patient was given a spinal   anesthetic and placed in the dorsal lithotomy position. He was   prepped and draped in sterile fashion. I inserted a 25-Jamaican   cystoscope into the urethra using the video camera and 30 degree   lens. The urethra appears normal. Prostate shows moderate   lateral lobe hypertrophy with obstruction. The scope was passed   into the bladder. There were numerous ovoid bladder stones on the floor of the   bladder. There were no tumors noted. The largest stone measures   3-4 cm in diameter and the other stones stone are slightly   smaller. A 900 micron holmium laser fiber was used at a power setting of   1.5 joules, 12 Hz. The stones were gradually fragmented, they   appeared of moderate density, although very tedious because of the   multiple stones. We had worked on the stones for about an hour   and most of these appeared to have been fragmented. At this time, we turned attention to the TURP. The scope was   removed and a 28-Jamaican continuous flow resectoscope sheath was   passed over an obturator into the bladder. The resectoscope with   30 degree lens and video camera was used. Prostate shows lateral   lobe hypertrophy.  I resected a small amount of median tissue and   then resected the lateral lobes from the bladder neck out to   verumontanum, right lobe followed by left lobe. Small amount of   anterior tissue was also removed. The bleeders were cauterized   using the coag loop. At this point, the chips appeared to be back in the bladder, I   tried to irrigate the chips and stone fragments using a Travis   syringe through the resectoscope. It became evident that there   were a few fragments of the stone which were too large to get out   through the resectoscope. The cystoscope was then reintroduced with the laser fiber, we   worked on some of the other fragments. Resectoscope was   reinserted and we hand irrigated through the resectoscope. We had   to reinsert the cystoscope and do some more fragmenting with the   laser. This was followed again by the resectoscope with hand   irrigation. All the pieces of stone and prostate chips had been   removed at this point, these were sent together in formalin to   Pathology. Hemostasis appeared to be good. Bladder showed no   evidence of injury. I inserted a 24-Greek 3-way Harp in the   bladder over a catheter guide, balloon was inflated with 30 mL   of water and connected to continuous bladder irrigation. The   patient tolerated this well. The case was more difficult than   usual because of the multiplicity of stones, entire procedure   took 120 minutes.         Danyell Disla MD      [facility]arcel[facility] Holstein / MARCELLO   D:  07/24/2017   19:18   T:  07/24/2017   22:41   Job #:  [id number]

## 2017-07-25 NOTE — PROGRESS NOTES
Medicare Outpatient Observation Notice provided to Mr. Paramjit Palomino in room 205. Oral explanation was provided and all questions answered. Signed document placed in the medical record under media tab. Copy to patient. No discharge needs identified (s/p TURP, on CBI).

## 2017-07-25 NOTE — ANESTHESIA POSTPROCEDURE EVALUATION
Post-Anesthesia Evaluation and Assessment    Patient: Kely Chapin MRN: [id number]  SSN: [id number]    YOB: 1955  Age: 57 y.o. Sex: male       Cardiovascular Function/Vital Signs  Visit Vitals    /77    Pulse (!) 102    Temp 36.6 °C (97.8 °F)    Resp 16    Ht 6' (1.829 m)    Wt 82.9 kg (182 lb 12.8 oz)    SpO2 99%    BMI 24.79 kg/m2       Patient is status post spinal anesthesia for Procedure(s):  CYSTOSCOPY TRANSURETHRAL RESECTION OF PROSTATE  CYSTO LITHOLAPAXY. Nausea/Vomiting: None    Postoperative hydration reviewed and adequate. Pain:  Pain Scale 1: Numeric (0 - 10) (07/24/17 1951)  Pain Intensity 1: 0 (07/24/17 1951)   Managed    Neurological Status:   Neuro (WDL): Within Defined Limits (07/24/17 1951)  Neuro  Neurologic State: Alert; Appropriate for age (07/24/17 1951)  LLE Motor Response: Pharmacologically paralyzed (07/24/17 1951)  RLE Motor Response: Pharmacologically paralyzed (07/24/17 1951)   At baseline    Mental Status and Level of Consciousness: Arousable    Pulmonary Status:   O2 Device: Nasal cannula (07/24/17 1951)   Adequate oxygenation and airway patent    Complications related to anesthesia: None    Post-anesthesia assessment completed. No concerns. Good result with spinal anesthesia, resolving normally.     Signed By: Jeff De La Rosa MD     July 24, 2017

## 2017-07-25 NOTE — PROGRESS NOTES
Primary Nurse Shania Blankenship RN and Airam Lucero RN performed a dual skin assessment on this patient No impairment noted  Alban score is 19.

## 2017-07-25 NOTE — PERIOP NOTES
TRANSFER - OUT REPORT:    Verbal report given to [facility] RN(name) on Swathi Bond  being transferred to [facility](unit) for routine post - op       Report consisted of patients Situation, Background, Assessment and   Recommendations(SBAR). Information from the following report(s) SBAR, Kardex, OR Summary, Procedure Summary, Intake/Output and MAR was reviewed with the receiving nurse. Lines:   Peripheral IV 07/24/17 Right Hand (Active)   Site Assessment Clean, dry, & intact 7/24/2017  7:51 PM   Phlebitis Assessment 0 7/24/2017  7:51 PM   Infiltration Assessment 0 7/24/2017  7:51 PM   Dressing Status Clean, dry, & intact 7/24/2017  7:51 PM   Dressing Type Tape;Transparent 7/24/2017  7:08 PM   Hub Color/Line Status Green; Infusing 7/24/2017  7:08 PM        Opportunity for questions and clarification was provided. VTE prophylaxis orders have been written for [facility]. Patient and family given floor number and nurses name. Family updated re: pt status after security code verified.

## 2017-07-26 PROBLEM — R50.82 POSTOPERATIVE FEVER: Status: ACTIVE | Noted: 2017-07-26

## 2017-07-26 PROBLEM — R50.9 FEVER: Status: ACTIVE | Noted: 2017-07-26

## 2017-07-26 PROCEDURE — 99218 HC RM OBSERVATION: CPT

## 2017-07-26 PROCEDURE — 65270000029 HC RM PRIVATE

## 2017-07-26 PROCEDURE — 74011250637 HC RX REV CODE- 250/637: Performed by: UROLOGY

## 2017-07-26 RX ORDER — SULFAMETHOXAZOLE AND TRIMETHOPRIM 800; 160 MG/1; MG/1
1 TABLET ORAL EVERY 12 HOURS
Status: DISCONTINUED | OUTPATIENT
Start: 2017-07-26 | End: 2017-07-27 | Stop reason: HOSPADM

## 2017-07-26 RX ADMIN — PANTOPRAZOLE SODIUM 40 MG: 40 TABLET, DELAYED RELEASE ORAL at 07:31

## 2017-07-26 RX ADMIN — SULFAMETHOXAZOLE AND TRIMETHOPRIM 1 TABLET: 800; 160 TABLET ORAL at 21:13

## 2017-07-26 RX ADMIN — SULFAMETHOXAZOLE AND TRIMETHOPRIM 1 TABLET: 800; 160 TABLET ORAL at 09:39

## 2017-07-26 RX ADMIN — ACETAMINOPHEN 650 MG: 325 TABLET ORAL at 00:39

## 2017-07-26 RX ADMIN — Medication 5 ML: at 00:44

## 2017-07-26 RX ADMIN — Medication 10 ML: at 21:15

## 2017-07-26 RX ADMIN — Medication 5 ML: at 05:30

## 2017-07-26 NOTE — PROGRESS NOTES
END OF SHIFT NOTE:    INTAKE/OUTPUT  07/25 0701 - 07/26 0700  In: 56917 [I.V.:1123]  Out: 41500 [Urine:44893]  Voiding: NO  Catheter: YES  Drain:              Flatus: Patient does have flatus present. Stool:  0 occurrences. Characteristics:       Emesis: 0 occurrences. Characteristics:        VITAL SIGNS  Patient Vitals for the past 12 hrs:   Temp Pulse Resp BP SpO2   07/26/17 0330 98.3 °F (36.8 °C) 87 18 126/69 95 %   07/26/17 0032 (!) 101.4 °F (38.6 °C) (!) 112 18 146/84 94 %   07/25/17 1920 100 °F (37.8 °C) 100 18 (!) 155/94 96 %       Pain Assessment  Pain Intensity 1: 7 (07/25/17 0228)  Pain Location 1: Flank  Pain Intervention(s) 1: Medication (see MAR)  Patient Stated Pain Goal: 0    Ambulating  No    Shift report given to oncoming nurse at the bedside.     Martin Plummer RN

## 2017-07-26 NOTE — PROGRESS NOTES
Subjective:   Daily Progress Note: 2017 8:19 AM  No Complaints. Had fever to 101.4 at 0030. No c/o now. Objective:     Visit Vitals    /76    Pulse 91    Temp 98.4 °F (36.9 °C)    Resp 17    Ht 6' (1.829 m)    Wt 182 lb 12.8 oz (82.9 kg)    SpO2 96%    BMI 24.79 kg/m2    O2 Flow Rate (L/min): 3 l/min O2 Device: Nasal cannula    Temp (24hrs), Av °F (37.8 °C), Min:98.3 °F (36.8 °C), Max:101.4 °F (38.6 °C)      1901 -  0700  In: 62877 [I.V.:1420]  Out: 57979 [Urine:17166]       [unfilled]  [unfilled]  [unfilled]    Exam: urine clear. Data Review    No results found for this or any previous visit (from the past 24 hour(s)). Assessment   Principal Problem:    Bladder calculi (2017)      Overview: Date of Procedure: 2017       Preoperative Diagnosis: Bladder stone [N21.0]      Benign nodular prostatic hyperplasia with lower urinary tract symptoms       [N40.1]      Postoperative Diagnosis: Bladder stone [N21.0]      Benign nodular prostatic hyperplasia with lower urinary tract symptoms       [N40.1]        Procedure(s):      CYSTOSCOPY TRANSURETHRAL RESECTION OF PROSTATE      CYSTO LITHOLAPAXY    Active Problems:    BPH (benign prostatic hyperplasia) (2017)      Postoperative fever (2017)        Plan:  Had fever this am, likely due to UTI. Will start po Bactrim, leave samayoa in . Observe today.

## 2017-07-26 NOTE — PROGRESS NOTES
Temp is 101.4. Encouraged used of IS while awake, verbalized understanding. Tylenol 650mg given for temp. Will continue to monitor.

## 2017-07-26 NOTE — PROGRESS NOTES
Dr. Cosmo Torres informed that patient had elevated temperature this afternoon,101.4, started using IS and temperature came down to 99.4. Also informed that when patient stood up he had approximately 25-30ml of blood come from penis, no new orders received. Will continue to monitor.

## 2017-07-27 VITALS
WEIGHT: 182.8 LBS | DIASTOLIC BLOOD PRESSURE: 77 MMHG | HEIGHT: 72 IN | BODY MASS INDEX: 24.76 KG/M2 | SYSTOLIC BLOOD PRESSURE: 152 MMHG | RESPIRATION RATE: 17 BRPM | HEART RATE: 90 BPM | TEMPERATURE: 98.4 F | OXYGEN SATURATION: 95 %

## 2017-07-27 PROCEDURE — 74011250637 HC RX REV CODE- 250/637: Performed by: UROLOGY

## 2017-07-27 RX ORDER — SULFAMETHOXAZOLE AND TRIMETHOPRIM 800; 160 MG/1; MG/1
1 TABLET ORAL 2 TIMES DAILY
Qty: 14 TAB | Refills: 0 | Status: SHIPPED | OUTPATIENT
Start: 2017-07-27 | End: 2017-08-03

## 2017-07-27 RX ORDER — HYDROCODONE BITARTRATE AND ACETAMINOPHEN 5; 325 MG/1; MG/1
1-2 TABLET ORAL
Qty: 25 TAB | Refills: 0 | Status: SHIPPED | OUTPATIENT
Start: 2017-07-27 | End: 2017-08-11

## 2017-07-27 RX ADMIN — HYDROCODONE BITARTRATE AND ACETAMINOPHEN 1 TABLET: 5; 325 TABLET ORAL at 07:43

## 2017-07-27 RX ADMIN — Medication 10 ML: at 05:15

## 2017-07-27 RX ADMIN — PANTOPRAZOLE SODIUM 40 MG: 40 TABLET, DELAYED RELEASE ORAL at 07:33

## 2017-07-27 RX ADMIN — SULFAMETHOXAZOLE AND TRIMETHOPRIM 1 TABLET: 800; 160 TABLET ORAL at 08:51

## 2017-07-27 NOTE — PROGRESS NOTES
END OF SHIFT NOTE:    INTAKE/OUTPUT  07/26 0701 - 07/27 0700  In: 6000   Out: 84369 [Urine:73692]  Voiding: NO  Catheter: YES    Drain:              Flatus: Patient does have flatus present. Stool:  0 occurrences. Characteristics:       Emesis: 0 occurrences. Characteristics:        VITAL SIGNS  Patient Vitals for the past 12 hrs:   Temp Pulse Resp BP SpO2   07/27/17 0710 98.4 °F (36.9 °C) 90 17 152/77 94 %   07/27/17 0405 98.9 °F (37.2 °C) 87 17 132/79 97 %   07/26/17 2325 98.1 °F (36.7 °C) 97 18 139/73 95 %   07/26/17 2000 98.2 °F (36.8 °C) 92 17 136/82 96 %       Pain Assessment  Pain Intensity 1: 7 (07/25/17 0228)  Pain Location 1: Flank  Pain Intervention(s) 1: Medication (see MAR)  Patient Stated Pain Goal: 0    Ambulating  Yes with assistive device. Shift report given to oncoming nurse at the bedside.     Demond Wong RN

## 2017-07-27 NOTE — DISCHARGE SUMMARY
[address] SUMMARY       Name:  Britney Hathaway   MR#:  [id number]   :  1955   Account #:  [de-identified]   Date of Adm:  2017       DISCHARGE DIAGNOSES:   1. Bladder calculus. 2. BPH. HISTORY OF ILLNESS: Patient with history of recurrent urinary   retention. He had some urinary retention in . Also history of   elevated PSA of 5.7. Biopsy in 2015 showed a 26-gram prostate. PSA density is 0.22. Pathology showed all biopsies were benign with   inflammation present in most of the biopsies. He had urinary retention as noted in  with 1100 mL obtained   after Harp catheter placement. Creatinine elevated up to 2.3. He   was able to void after that. Cystoscopy in  showed some   moderate BPH. He has failed followup since that time. Past medical history is significant for some spinal surgery,   cervical fusion by Dr. Elida Emery in [address] [place] [address]. He uses a walker as   sequelae of that. Also history of drug abuse including crack   cocaine but denies recent use. The patient recently had some gross hematuria. KUB showed some   bladder stones. Cystoscopy confirms 3 separate bladder stones, the   largest about 3 cm in diameter and moderate BPH. The patient   brought in at this time for treatment of that. HOSPITAL COURSE: On the day of admission, the patient underwent   cystoscopy, laser lithotripsy of bladder stones. The stones were   successfully fragmented and removed. After that, he underwent a   TURP. Postoperatively he had a Harp catheter. On postop day 2, he   had a fever up to 101.4 and was kept in the hospital, started on   p.o. Bactrim. The following day he was afebrile. Urine remained   clear. Catheter was removed. Pathology shows BPH with  prostatitis as   well as stone material. Chemical analysis of stone is still   pending. Specimen weighs 24 grams. PLAN: Ahrp is removed. He will be discharged when he can void.    He is to return to the office in about 2 weeks.  He may need check   of his post void residual in the         Luis Pickard MD      [facility] / [facility] Michel   D:  07/27/2017   08:10   T:  07/27/2017   14:00   Job #:  [id number]

## 2017-07-27 NOTE — PROGRESS NOTES
Subjective:   Daily Progress Note: 2017 8:05 AM  No Complaints  Objective:     Visit Vitals    /77    Pulse 90    Temp 98.4 °F (36.9 °C)    Resp 17    Ht 6' (1.829 m)    Wt 182 lb 12.8 oz (82.9 kg)    SpO2 94%    BMI 24.79 kg/m2    O2 Flow Rate (L/min): 3 l/min O2 Device: Nasal cannula    Temp (24hrs), Av.4 °F (36.9 °C), Min:98.1 °F (36.7 °C), Max:98.9 °F (37.2 °C)      1901 -  07  In: 39893 [I.V.:197]  Out: 50726 [Urine:87154]  701 - 1900  In: -   Out: 1500 [Urine:1500]    [unfilled]  [unfilled]  [unfilled]    Exam: urine clear      Data Review    No results found for this or any previous visit (from the past 24 hour(s)). Assessment   Principal Problem:    Bladder calculi (2017)      Overview: Date of Procedure: 2017       Preoperative Diagnosis: Bladder stone [N21.0]      Benign nodular prostatic hyperplasia with lower urinary tract symptoms       [N40.1]      Postoperative Diagnosis: Bladder stone [N21.0]      Benign nodular prostatic hyperplasia with lower urinary tract symptoms       [N40.1]        Procedure(s):      CYSTOSCOPY TRANSURETHRAL RESECTION OF PROSTATE      CYSTO LITHOLAPAXY    Active Problems:    BPH (benign prostatic hyperplasia) (2017)      Postoperative fever (2017)      Fever (2017)        Plan: Will remove samayoa, home when can void appt in 2 weeks.

## 2017-07-27 NOTE — DISCHARGE INSTRUCTIONS
DISCHARGE SUMMARY from Nurse    The following personal items are in your possession at time of discharge:    Dental Appliances: None  Visual Aid: Glasses     Home Medications: None  Jewelry: None  Clothing: Footwear, Pants, Shirt, Undergarments  Other Valuables: None             PATIENT INSTRUCTIONS:    After general anesthesia or intravenous sedation, for 24 hours or while taking prescription Narcotics:  · Limit your activities  · Do not drive and operate hazardous machinery  · Do not make important personal or business decisions  · Do  not drink alcoholic beverages  · If you have not urinated within 8 hours after discharge, please contact your surgeon on call. Report the following to your surgeon:  · Excessive pain, swelling, redness or odor of or around the surgical area  · Temperature over 100.5  · Nausea and vomiting lasting longer than 4 hours or if unable to take medications  · Any signs of decreased circulation or nerve impairment to extremity: change in color, persistent  numbness, tingling, coldness or increase pain  · Any questions        What to do at Home:  Recommended activity: Activity as tolerated, per MD instructions    If you experience any of the following symptoms fever > 100.5, nausea, vomiting, unable to void, bleeding with urination, pain, chest pain, and shortness of breath  please follow up with MD.      *  Please give a list of your current medications to your Primary Care Provider. *  Please update this list whenever your medications are discontinued, doses are      changed, or new medications (including over-the-counter products) are added. *  Please carry medication information at all times in case of emergency situations. These are general instructions for a healthy lifestyle:    No smoking/ No tobacco products/ Avoid exposure to second hand smoke    Surgeon General's Warning:  Quitting smoking now greatly reduces serious risk to your health.     Obesity, smoking, and sedentary lifestyle greatly increases your risk for illness    A healthy diet, regular physical exercise & weight monitoring are important for maintaining a healthy lifestyle    You may be retaining fluid if you have a history of heart failure or if you experience any of the following symptoms:  Weight gain of 3 pounds or more overnight or 5 pounds in a week, increased swelling in our hands or feet or shortness of breath while lying flat in bed. Please call your doctor as soon as you notice any of these symptoms; do not wait until your next office visit. Recognize signs and symptoms of STROKE:    F-face looks uneven    A-arms unable to move or move unevenly    S-speech slurred or non-existent    T-time-call 911 as soon as signs and symptoms begin-DO NOT go       Back to bed or wait to see if you get better-TIME IS BRAIN. Warning Signs of HEART ATTACK     Call 911 if you have these symptoms:   Chest discomfort. Most heart attacks involve discomfort in the center of the chest that lasts more than a few minutes, or that goes away and comes back. It can feel like uncomfortable pressure, squeezing, fullness, or pain.  Discomfort in other areas of the upper body. Symptoms can include pain or discomfort in one or both arms, the back, neck, jaw, or stomach.  Shortness of breath with or without chest discomfort.  Other signs may include breaking out in a cold sweat, nausea, or lightheadedness. Don't wait more than five minutes to call 911 - MINUTES MATTER! Fast action can save your life. Calling 911 is almost always the fastest way to get lifesaving treatment. Emergency Medical Services staff can begin treatment when they arrive -- up to an hour sooner than if someone gets to the hospital by car. The discharge information has been reviewed with the patient. The patient verbalized understanding.     Discharge medications reviewed with the patient and appropriate educational materials and side effects teaching were provided. Transurethral Resection of the Prostate (TURP): What to Expect at [facility]    Transurethral resection of the prostate (TURP) is surgery to remove prostate tissue. It is done when an overgrown prostate gland is pressing on the urethra and making it hard for a man to urinate. You may need a urinary catheter for a short time. It is a flexible plastic tube used to drain urine from your bladder when you can't urinate on your own. If it is still in place when you go home, your doctor will give you instructions on how to care for your catheter. For several days after surgery, you may feel burning when you urinate. Your urine may be pink for 1 to 3 weeks after surgery. You also may have bladder cramps, or spasms. Your doctor may give you medicine to help control the spasms. You may still feel like you need to urinate often in the weeks after your surgery. It often takes up to 6 weeks for this to get better. After you have healed, you may have less trouble urinating. You may have better control over starting and stopping your urine stream. And you may feel like you get more relief when you urinate. Most men can return to work or many of their usual tasks in 1 to 3 weeks. But for about 6 weeks, try to avoid heavy lifting and strenuous activities that might put extra pressure on your bladder. Most men still can have erections after surgery (if they were able to have them before surgery). But they may not ejaculate when they have an orgasm. Semen may go into the bladder instead of out through the penis. This is called retrograde ejaculation. It does not hurt and is not harmful to your health. This care sheet gives you a general idea about how long it will take for you to recover. But each person recovers at a different pace. Follow the steps below to get better as quickly as possible. How can you care for yourself at home? Activity  · Rest when you feel tired. · Be active. Walking is a good choice. · Allow your body to heal. Don't move quickly or lift anything heavy until you are feeling better. · Ask your doctor when you can drive again. · Many people are able to return to work within 1 to 3 weeks after surgery. It depends on the type of work you do and how you feel. · Do not put anything in your rectum, such as an enema or suppository, for 4 to 6 weeks after the surgery. · You may shower and take baths when your doctor says it is okay. · Ask your doctor when it is okay for you to have sex. Diet  · You can eat your normal diet. If your stomach is upset, try bland, low-fat foods like plain rice, broiled chicken, toast, and yogurt. · If your bowel movements are not regular right after surgery, try to avoid constipation and straining. Drink plenty of water. Your doctor may suggest fiber, a stool softener, or a mild laxative. Medicines  · Your doctor will tell you if and when you can restart your medicines. He or she will also give you instructions about taking any new medicines. · If you take aspirin or some other blood thinner, be sure to talk to your doctor. He or she will tell you if and when to start taking those medicines again. Make sure that you understand exactly what your doctor wants you to do. · Be safe with medicines. Read and follow all instructions on the label. ¨ If the doctor gave you a prescription medicine for pain, take it as prescribed. ¨ If you are not taking a prescription pain medicine, ask your doctor if you can take an over-the-counter medicine. · Take your antibiotics as directed. Do not stop taking them just because you feel better. You need to take the full course of antibiotics. Follow-up care is a key part of your treatment and safety. Be sure to make and go to all appointments, and call your doctor if you are having problems. It's also a good idea to know your test results and keep a list of the medicines you take.   When should you call for help?  Call 911 anytime you think you may need emergency care. For example, call if:  · You passed out (lost consciousness). · You have symptoms of a blood clot in your lung (called a pulmonary embolism). These may include:  ¨ Sudden chest pain. ¨ Trouble breathing. ¨ Coughing up blood. Call your doctor now or seek immediate medical care if:  · You have symptoms of infection, such as:  ¨ Increased pain, swelling, warmth, or redness around the cut. ¨ Red streaks leading from the cut. ¨ Pus draining from the cut. ¨ A fever. · You have new or more blood or blood clots in your urine. · You have pain in the flank, which is just below the rib cage and above the waist on either side of the back. · You have new or worse pain or burning when you urinate. · You cannot urinate or have trouble urinating. · You have a new or worse problem with controlling your bladder. · You have signs of a blood clot, such as:  ¨ Pain in your calf, back of the knee, thigh, or groin. ¨ Redness and swelling in your leg or groin. Watch closely for changes in your health, and be sure to contact your doctor if:  · You are not getting better as expected. · You do not have a bowel movement after taking a laxative. Where can you learn more? Go to http://jose-jason.info/. Enter C404 in the search box to learn more about \"Transurethral Resection of the Prostate (TURP): What to Expect at Home. \"  Current as of: March 14, 2017  Content Version: 11.3  © 0517-0955 [employer]. Care instructions adapted under license by [employer] (which disclaims liability or warranty for this information). If you have questions about a medical condition or this instruction, always ask your healthcare professional. [place] [address] any warranty or liability for your use of this information.          Cystoscopy: What to Expect at [facility]    A cystoscopy is a procedure that lets a doctor look inside of the bladder and the urethra. The urethra is the tube that carries urine from the bladder to outside the body. The doctor uses a thin, lighted tool called a cystoscope. Your bladder is filled with fluid. This stretches the bladder so that your doctor can look closely at the inside of your bladder. After the cystoscopy, your urethra may be sore at first, and it may burn when you urinate for the first few days after the procedure. You may feel the need to urinate more often, and your urine may be pink. These symptoms should get better in 1 or 2 days. You will probably be able to go back to most of your usual activities in 1 or 2 days. This care sheet gives you a general idea about how long it will take for you to recover. But each person recovers at a different pace. Follow the steps below to get better as quickly as possible. How can you care for yourself at home? Activity  · Rest when you feel tired. Getting enough sleep will help you recover. · Try to walk each day. Start by walking a little more than you did the day before. Bit by bit, increase the amount you walk. Walking boosts blood flow and helps prevent pneumonia and constipation. · Avoid strenuous activities, such as bicycle riding, jogging, weight lifting, or aerobic exercise, until your doctor says it is okay. · Ask your doctor when you can drive again. · Most people are able to return to work within 1 or 2 days after the procedure. · You may shower and take baths as usual.  · Ask your doctor when it is okay for you to have sex. Diet  · You can eat your normal diet. If your stomach is upset, try bland, low-fat foods like plain rice, broiled chicken, toast, and yogurt. · Drink plenty of fluids (unless your doctor tells you not to). Medicines  · Take pain medicines exactly as directed. ¨ If the doctor gave you a prescription medicine for pain, take it as prescribed.   ¨ If you are not taking a prescription pain medicine, ask your doctor if you can take an over-the-counter medicine. · If you think your pain medicine is making you sick to your stomach:  ¨ Take your medicine after meals (unless your doctor has told you not to). ¨ Ask your doctor for a different pain medicine. · If your doctor prescribed antibiotics, take them as directed. Do not stop taking them just because you feel better. You need to take the full course of antibiotics. Follow-up care is a key part of your treatment and safety. Be sure to make and go to all appointments, and call your doctor if you are having problems. It's also a good idea to know your test results and keep a list of the medicines you take. When should you call for help? Call 911 anytime you think you may need emergency care. For example, call if:  · You passed out (lost consciousness). · You have severe trouble breathing. · You have sudden chest pain and shortness of breath, or you cough up blood. · You have severe belly pain. Call your doctor now or seek immediate medical care if:  · You are sick to your stomach or cannot keep fluids down. · Your urine is still red or you see blood clots after you have urinated several times. · You have trouble passing urine or stool, especially if you have pain or swelling in your lower belly. · You have signs of a blood clot, such as:  ¨ Pain in your calf, back of the knee, thigh, or groin. ¨ Redness and swelling in your leg or groin. · You develop a fever or severe chills. · You have pain in your back just below your rib cage. This is called flank pain. Watch closely for changes in your health, and be sure to contact your doctor if:  · You have pain or burning when you urinate. A burning feeling is normal for a day or two after the test, but call if it does not get better. · You have a frequent urge to urinate but can pass only small amounts of urine. · Your urine is pink, red, or cloudy, or smells bad.  It is normal for the urine to have a pinkish color for a few days after the test, but call if it does not get better. Where can you learn more? Go to http://jose-jason.info/. Enter U116 in the search box to learn more about \"Cystoscopy: What to Expect at Home. \"  Current as of: November 11, 2016  Content Version: 11.3  © 1349-8391 [employer]. Care instructions adapted under license by [employer] (which disclaims liability or warranty for this information). If you have questions about a medical condition or this instruction, always ask your healthcare professional. [place] [address] any warranty or liability for your use of this information.

## 2017-07-27 NOTE — PROGRESS NOTES
Pt's D/C instructions completed. Verbalized understanding of all instructions including diet, activity, s/sx to alert MD, medications, wound care, and f/u appointment. Patient to void prior to discharge.

## 2017-07-28 ENCOUNTER — HOSPITAL ENCOUNTER (EMERGENCY)
Age: 62
Discharge: HOME | End: 2017-07-28
Attending: EMERGENCY MEDICINE
Payer: MEDICARE

## 2017-07-28 VITALS
WEIGHT: 182 LBS | TEMPERATURE: 98.1 F | SYSTOLIC BLOOD PRESSURE: 133 MMHG | RESPIRATION RATE: 18 BRPM | OXYGEN SATURATION: 98 % | HEIGHT: 72 IN | BODY MASS INDEX: 24.65 KG/M2 | HEART RATE: 90 BPM | DIASTOLIC BLOOD PRESSURE: 77 MMHG

## 2017-07-28 DIAGNOSIS — R33.9 URINARY RETENTION: Primary | ICD-10-CM

## 2017-07-28 LAB
ALBUMIN SERPL BCP-MCNC: 3.3 G/DL (ref 3.2–4.6)
ALBUMIN/GLOB SERPL: 0.7 {RATIO} (ref 1.2–3.5)
ALP SERPL-CCNC: 84 U/L (ref 50–136)
ALT SERPL-CCNC: 47 U/L (ref 12–65)
ANION GAP BLD CALC-SCNC: 13 MMOL/L (ref 7–16)
APPEARANCE UR: ABNORMAL
AST SERPL W P-5'-P-CCNC: 37 U/L (ref 15–37)
BACTERIA URNS QL MICRO: 0 /HPF
BASOPHILS # BLD AUTO: 0 K/UL (ref 0–0.2)
BASOPHILS # BLD: 0 % (ref 0–2)
BILIRUB SERPL-MCNC: 0.3 MG/DL (ref 0.2–1.1)
BILIRUB UR QL: ABNORMAL
BUN SERPL-MCNC: 15 MG/DL (ref 8–23)
CALCIUM SERPL-MCNC: 8.6 MG/DL (ref 8.3–10.4)
CASTS URNS QL MICRO: 0 /LPF
CHLORIDE SERPL-SCNC: 104 MMOL/L (ref 98–107)
CO2 SERPL-SCNC: 22 MMOL/L (ref 21–32)
COLOR UR: ABNORMAL
CREAT SERPL-MCNC: 1.24 MG/DL (ref 0.8–1.5)
DIFFERENTIAL METHOD BLD: ABNORMAL
EOSINOPHIL # BLD: 0.3 K/UL (ref 0–0.8)
EOSINOPHIL NFR BLD: 3 % (ref 0.5–7.8)
EPI CELLS #/AREA URNS HPF: 0 /HPF
ERYTHROCYTE [DISTWIDTH] IN BLOOD BY AUTOMATED COUNT: 14.1 % (ref 11.9–14.6)
GLOBULIN SER CALC-MCNC: 4.9 G/DL (ref 2.3–3.5)
GLUCOSE SERPL-MCNC: 102 MG/DL (ref 65–100)
GLUCOSE UR STRIP.AUTO-MCNC: NEGATIVE MG/DL
HCT VFR BLD AUTO: 43.1 % (ref 41.1–50.3)
HGB BLD-MCNC: 14.8 G/DL (ref 13.6–17.2)
HGB UR QL STRIP: ABNORMAL
IMM GRANULOCYTES # BLD: 0 K/UL (ref 0–0.5)
IMM GRANULOCYTES NFR BLD AUTO: 0.2 % (ref 0–5)
KETONES UR QL STRIP.AUTO: NEGATIVE MG/DL
LEUKOCYTE ESTERASE UR QL STRIP.AUTO: ABNORMAL
LYMPHOCYTES # BLD AUTO: 15 % (ref 13–44)
LYMPHOCYTES # BLD: 1.4 K/UL (ref 0.5–4.6)
MCH RBC QN AUTO: 28.8 PG (ref 26.1–32.9)
MCHC RBC AUTO-ENTMCNC: 34.3 G/DL (ref 31.4–35)
MCV RBC AUTO: 83.9 FL (ref 79.6–97.8)
MONOCYTES # BLD: 1.2 K/UL (ref 0.1–1.3)
MONOCYTES NFR BLD AUTO: 13 % (ref 4–12)
NEUTS SEG # BLD: 6.5 K/UL (ref 1.7–8.2)
NEUTS SEG NFR BLD AUTO: 69 % (ref 43–78)
NITRITE UR QL STRIP.AUTO: NEGATIVE
PH UR STRIP: 6 [PH] (ref 5–9)
PLATELET # BLD AUTO: 271 K/UL (ref 150–450)
PMV BLD AUTO: 10.6 FL (ref 10.8–14.1)
POTASSIUM SERPL-SCNC: 3.7 MMOL/L (ref 3.5–5.1)
PROT SERPL-MCNC: 8.2 G/DL (ref 6.3–8.2)
PROT UR STRIP-MCNC: 100 MG/DL
RBC # BLD AUTO: 5.14 M/UL (ref 4.23–5.67)
RBC #/AREA URNS HPF: >100 /HPF
SODIUM SERPL-SCNC: 139 MMOL/L (ref 136–145)
SP GR UR REFRACTOMETRY: 1.02 (ref 1–1.02)
UROBILINOGEN UR QL STRIP.AUTO: 0.2 EU/DL (ref 0.2–1)
WBC # BLD AUTO: 9.3 K/UL (ref 4.3–11.1)
WBC URNS QL MICRO: >100 /HPF

## 2017-07-28 PROCEDURE — 96374 THER/PROPH/DIAG INJ IV PUSH: CPT | Performed by: EMERGENCY MEDICINE

## 2017-07-28 PROCEDURE — 85025 COMPLETE CBC W/AUTO DIFF WBC: CPT | Performed by: EMERGENCY MEDICINE

## 2017-07-28 PROCEDURE — 99283 EMERGENCY DEPT VISIT LOW MDM: CPT | Performed by: EMERGENCY MEDICINE

## 2017-07-28 PROCEDURE — 77030005515 HC CATH URETH FOL14 BARD -B

## 2017-07-28 PROCEDURE — 77030012862 HC BG URIN LEG BARD -A

## 2017-07-28 PROCEDURE — 74011250636 HC RX REV CODE- 250/636: Performed by: EMERGENCY MEDICINE

## 2017-07-28 PROCEDURE — 51798 US URINE CAPACITY MEASURE: CPT | Performed by: EMERGENCY MEDICINE

## 2017-07-28 PROCEDURE — 81001 URINALYSIS AUTO W/SCOPE: CPT | Performed by: EMERGENCY MEDICINE

## 2017-07-28 PROCEDURE — 80053 COMPREHEN METABOLIC PANEL: CPT | Performed by: EMERGENCY MEDICINE

## 2017-07-28 PROCEDURE — 51702 INSERT TEMP BLADDER CATH: CPT | Performed by: EMERGENCY MEDICINE

## 2017-07-28 RX ORDER — KETOROLAC TROMETHAMINE 30 MG/ML
30 INJECTION, SOLUTION INTRAMUSCULAR; INTRAVENOUS
Status: COMPLETED | OUTPATIENT
Start: 2017-07-28 | End: 2017-07-28

## 2017-07-28 RX ADMIN — KETOROLAC TROMETHAMINE 30 MG: 30 INJECTION, SOLUTION INTRAMUSCULAR at 03:41

## 2017-07-28 NOTE — ED TRIAGE NOTES
patient states they took his catheter out yesterday and since has been passing a lot of blood in his urine.

## 2017-07-28 NOTE — ED PROVIDER NOTES
HPI Comments:  Patient with history of recurrent urinary   retention. He had some urinary retention in 2015. Also history of   elevated PSA of 5.7. Biopsy in 2015 showed a 26-gram prostate. PSA density is 0.22. Pathology showed all biopsies were benign with   inflammation present in most of the biopsies.      He had urinary retention as noted in 2015 with 1100 mL obtained   after Harp catheter placement. Creatinine elevated up to 2.3. He   was able to void after that. Cystoscopy in 2015 showed some   moderate BPH. He has failed followup since that time.      Past medical history is significant for some spinal surgery,   cervical fusion by Dr. Sebas Argueta in [address]. He uses a walker as   sequelae of that. Also history of drug abuse including crack   cocaine but denies recent use.      The patient recently had some gross hematuria. KUB showed some   bladder stones. Cystoscopy confirms 3 separate bladder stones, the   largest about 3 cm in diameter and moderate BPH. The patient   brought in by Dr. Christ Rod for treatment. 3 bladder symptoms were broken up and removed. Patient had a Harp catheter placed at that time 4 days ago. It was removed earlier today. Patient was urinating and then stopped at 2230. Unable to urinate since then. Blood coming from his penis. Patient is a 57 y.o. male presenting with inability to urinate. The history is provided by the patient. No  was used. Urinary Retention    This is a new problem. The current episode started 3 to 5 hours ago. The problem occurs constantly. The problem has not changed since onset. The pain is associated with an unknown factor. Pain location: penis. The quality of the pain is aching. The pain is mild. Associated symptoms include dysuria and hematuria. Pertinent negatives include no fever, no diarrhea, no melena, no nausea, no vomiting, no constipation, no headaches, no chest pain and no back pain. Nothing worsens the pain.  The pain is relieved by nothing. His past medical history is significant for kidney stones. Past Medical History:   Diagnosis Date    Altered gait     Arthritis     CAD (coronary artery disease)     pt denies; ekg dated 7-21-14 states \"normal ekg\"    GERD (gastroesophageal reflux disease)     occ    Hypertension     pt denies     Kidney stone     PUD (peptic ulcer disease)     1985    Smoker     approx one pack daily - 35 years        Past Surgical History:   Procedure Laterality Date    HX ORTHOPAEDIC      spine; left and right hip replacement         Family History:   Problem Relation Age of Onset    Cancer Father        Social History     Social History    Marital status: SINGLE     Spouse name: N/A    Number of children: N/A    Years of education: N/A     Occupational History    Not on file. Social History Main Topics    Smoking status: Current Every Day Smoker     Packs/day: 1.00     Years: 35.00    Smokeless tobacco: Never Used    Alcohol use Yes      Comment: social    Drug use: Yes     Special: Marijuana, Cocaine    Sexual activity: Not on file     Other Topics Concern    Not on file     Social History Narrative         ALLERGIES: Review of patient's allergies indicates no known allergies. Review of Systems   Constitutional: Negative for chills and fever. HENT: Negative for rhinorrhea and sore throat. Eyes: Negative for pain and redness. Respiratory: Negative for chest tightness, shortness of breath and wheezing. Cardiovascular: Negative for chest pain and leg swelling. Gastrointestinal: Negative for abdominal pain, constipation, diarrhea, melena, nausea and vomiting. Genitourinary: Positive for difficulty urinating, dysuria, hematuria and penile pain. Musculoskeletal: Negative for back pain, gait problem, neck pain and neck stiffness. Skin: Negative for color change and rash. Neurological: Negative for weakness, numbness and headaches.        Vitals:    07/28/17 0224 BP: 133/77   Pulse: 90   Resp: 18   Temp: 98.1 °F (36.7 °C)   SpO2: 98%   Weight: 82.6 kg (182 lb)   Height: 6' (1.829 m)            Physical Exam   Constitutional: He is oriented to person, place, and time. He appears well-developed and well-nourished. HENT:   Head: Normocephalic and atraumatic. Neck: Normal range of motion. Neck supple. Cardiovascular: Normal rate and regular rhythm. No murmur heard. Pulmonary/Chest: Effort normal and breath sounds normal. He has no wheezes. Abdominal: Soft. Bowel sounds are normal. There is no tenderness. Genitourinary: Penile tenderness (blood at tip of penis. ) present. Musculoskeletal: Normal range of motion. He exhibits no edema. Neurological: He is alert and oriented to person, place, and time. Skin: Skin is warm and dry. Nursing note and vitals reviewed. MDM  Number of Diagnoses or Management Options  Diagnosis management comments: Urinary retention. Harp relieved. Will discharge with leg bag. On abx. Amount and/or Complexity of Data Reviewed  Clinical lab tests: ordered and reviewed  Tests in the medicine section of CPT®: ordered and reviewed    Patient Progress  Patient progress: stable    ED Course       Procedures           Results Include:    Recent Results (from the past 24 hour(s))   CBC WITH AUTOMATED DIFF    Collection Time: 07/28/17  3:28 AM   Result Value Ref Range    WBC 9.3 4.3 - 11.1 K/uL    RBC 5.14 4.23 - 5.67 M/uL    HGB 14.8 13.6 - 17.2 g/dL    HCT 43.1 41.1 - 50.3 %    MCV 83.9 79.6 - 97.8 FL    MCH 28.8 26.1 - 32.9 PG    MCHC 34.3 31.4 - 35.0 g/dL    RDW 14.1 11.9 - 14.6 %    PLATELET 447 298 - 631 K/uL    MPV 10.6 (L) 10.8 - 14.1 FL    DF AUTOMATED      NEUTROPHILS 69 43 - 78 %    LYMPHOCYTES 15 13 - 44 %    MONOCYTES 13 (H) 4.0 - 12.0 %    EOSINOPHILS 3 0.5 - 7.8 %    BASOPHILS 0 0.0 - 2.0 %    IMMATURE GRANULOCYTES 0.2 0.0 - 5.0 %    ABS. NEUTROPHILS 6.5 1.7 - 8.2 K/UL    ABS.  LYMPHOCYTES 1.4 0.5 - 4.6 K/UL ABS. MONOCYTES 1.2 0.1 - 1.3 K/UL    ABS. EOSINOPHILS 0.3 0.0 - 0.8 K/UL    ABS. BASOPHILS 0.0 0.0 - 0.2 K/UL    ABS. IMM. GRANS. 0.0 0.0 - 0.5 K/UL   METABOLIC PANEL, COMPREHENSIVE    Collection Time: 07/28/17  3:28 AM   Result Value Ref Range    Sodium 139 136 - 145 mmol/L    Potassium 3.7 3.5 - 5.1 mmol/L    Chloride 104 98 - 107 mmol/L    CO2 22 21 - 32 mmol/L    Anion gap 13 7 - 16 mmol/L    Glucose 102 (H) 65 - 100 mg/dL    BUN 15 8 - 23 MG/DL    Creatinine 1.24 0.8 - 1.5 MG/DL    GFR est AA >60 >60 ml/min/1.73m2    GFR est non-AA >60 >60 ml/min/1.73m2    Calcium 8.6 8.3 - 10.4 MG/DL    Bilirubin, total 0.3 0.2 - 1.1 MG/DL    ALT (SGPT) 47 12 - 65 U/L    AST (SGOT) 37 15 - 37 U/L    Alk.  phosphatase 84 50 - 136 U/L    Protein, total 8.2 6.3 - 8.2 g/dL    Albumin 3.3 3.2 - 4.6 g/dL    Globulin 4.9 (H) 2.3 - 3.5 g/dL    A-G Ratio 0.7 (L) 1.2 - 3.5     URINALYSIS W/ RFLX MICROSCOPIC    Collection Time: 07/28/17  4:15 AM   Result Value Ref Range    Color RED      Appearance TURBID      Specific gravity 1.020 1.001 - 1.023      pH (UA) 6.0 5.0 - 9.0      Protein 100 (A) NEG mg/dL    Glucose NEGATIVE  mg/dL    Ketone NEGATIVE  NEG mg/dL    Bilirubin SMALL (A) NEG      Blood LARGE (A) NEG      Urobilinogen 0.2 0.2 - 1.0 EU/dL    Nitrites NEGATIVE  NEG      Leukocyte Esterase MODERATE (A) NEG      WBC >100 (H) 0 /hpf    RBC >100 (H) 0 /hpf    Epithelial cells 0 0 /hpf    Bacteria 0 0 /hpf    Casts 0 0 /lpf

## 2017-07-28 NOTE — ED NOTES
Katiuska converted to a leg bag. Pt tolerated well and states he is familiar with caring for a leg bag.  AB

## 2017-07-28 NOTE — ED TRIAGE NOTES
patient states urinary retention since 2230 last night. States recent urological procedure preformed.

## 2017-07-28 NOTE — DISCHARGE INSTRUCTIONS
Urinary Retention: Care Instructions  Your Care Instructions    Urinary retention means that you aren't able to urinate. In men, it is often caused by a blockage of the urinary tract from an enlarged prostate gland. In men and women, it can also be caused by an infection or nerve damage. Or it may be a side effect of a medicine. The doctor may have drained the urine from your bladder. If you still have problems passing urine, you may need to use a catheter at home. This is used to empty your bladder until the problem can be fixed. Your doctor may put a catheter in your bladder before you go home. If so, he or she will tell you when to come back to have the catheter removed. The doctor has checked you closely. But problems can develop later. If you notice any problems or new symptoms, get medical treatment right away. Follow-up care is a key part of your treatment and safety. Be sure to make and go to all appointments, and call your doctor if you are having problems. It's also a good idea to know your test results and keep a list of the medicines you take. How can you care for yourself at home? · Take your medicines exactly as prescribed. Call your doctor if you think you are having a problem with your medicine. You will get more details on the specific medicines your doctor prescribes. · Check with your doctor before you use any over-the-counter medicines. Many cold and allergy medicines, for example, can make this problem worse. Make sure your doctor knows all of the medicines, vitamins, supplements, and herbal remedies you take. · Spread out through the day the amount of fluid you drink. Do not drink a lot at bedtime. · Avoid alcohol and caffeine. · If you have been given a catheter, or if one is already in place, follow the instructions you were given. Always wash your hands before and after you handle the catheter. When should you call for help?   Call your doctor now or seek immediate medical care if:  · You cannot urinate at all, or it is getting harder to urinate. · You have symptoms of a urinary tract infection. These may include:  ¨ Pain or burning when you urinate. ¨ A frequent need to urinate without being able to pass much urine. ¨ Pain in the flank, which is just below the rib cage and above the waist on either side of the back. ¨ Blood in your urine. ¨ A fever. Watch closely for changes in your health, and be sure to contact your doctor if:  · You have any problems with your catheter. · You do not get better as expected. Where can you learn more? Go to http://jose-jason.info/. Enter M244 in the search box to learn more about \"Urinary Retention: Care Instructions. \"  Current as of: August 12, 2016  Content Version: 11.3  © 3631-0833 [employer]. Care instructions adapted under license by [employer] (which disclaims liability or warranty for this information). If you have questions about a medical condition or this instruction, always ask your healthcare professional. [place] [address] any warranty or liability for your use of this information.

## 2017-12-12 ENCOUNTER — HOSPITAL ENCOUNTER (EMERGENCY)
Age: 62
Discharge: HOME | End: 2017-12-12
Attending: EMERGENCY MEDICINE
Payer: MEDICARE

## 2017-12-12 ENCOUNTER — APPOINTMENT (OUTPATIENT)
Dept: GENERAL RADIOLOGY | Age: 62
End: 2017-12-12
Attending: EMERGENCY MEDICINE
Payer: MEDICARE

## 2017-12-12 VITALS
HEART RATE: 86 BPM | WEIGHT: 184 LBS | HEIGHT: 72 IN | OXYGEN SATURATION: 97 % | RESPIRATION RATE: 16 BRPM | BODY MASS INDEX: 24.92 KG/M2 | SYSTOLIC BLOOD PRESSURE: 176 MMHG | TEMPERATURE: 97.8 F | DIASTOLIC BLOOD PRESSURE: 84 MMHG

## 2017-12-12 DIAGNOSIS — R25.2 CRAMPS, MUSCLE, GENERAL: ICD-10-CM

## 2017-12-12 DIAGNOSIS — R10.84 ABDOMINAL PAIN, GENERALIZED: Primary | ICD-10-CM

## 2017-12-12 LAB
ALBUMIN SERPL-MCNC: 3.2 G/DL (ref 3.2–4.6)
ALBUMIN/GLOB SERPL: 0.6 {RATIO} (ref 1.2–3.5)
ALP SERPL-CCNC: 90 U/L (ref 50–136)
ALT SERPL-CCNC: 35 U/L (ref 12–65)
ANION GAP SERPL CALC-SCNC: 11 MMOL/L (ref 7–16)
AST SERPL-CCNC: 27 U/L (ref 15–37)
BASOPHILS # BLD: 0 K/UL (ref 0–0.2)
BASOPHILS NFR BLD: 0 % (ref 0–2)
BILIRUB SERPL-MCNC: 0.6 MG/DL (ref 0.2–1.1)
BUN SERPL-MCNC: 17 MG/DL (ref 8–23)
CALCIUM SERPL-MCNC: 9.2 MG/DL (ref 8.3–10.4)
CHLORIDE SERPL-SCNC: 100 MMOL/L (ref 98–107)
CO2 SERPL-SCNC: 25 MMOL/L (ref 21–32)
CREAT SERPL-MCNC: 1.12 MG/DL (ref 0.8–1.5)
DIFFERENTIAL METHOD BLD: ABNORMAL
EOSINOPHIL # BLD: 0.1 K/UL (ref 0–0.8)
EOSINOPHIL NFR BLD: 1 % (ref 0.5–7.8)
ERYTHROCYTE [DISTWIDTH] IN BLOOD BY AUTOMATED COUNT: 12.7 % (ref 11.9–14.6)
GLOBULIN SER CALC-MCNC: 5.5 G/DL (ref 2.3–3.5)
GLUCOSE SERPL-MCNC: 102 MG/DL (ref 65–100)
HCT VFR BLD AUTO: 44.7 % (ref 41.1–50.3)
HGB BLD-MCNC: 14.6 G/DL (ref 13.6–17.2)
IMM GRANULOCYTES # BLD: 0 K/UL (ref 0–0.5)
IMM GRANULOCYTES NFR BLD AUTO: 0 % (ref 0–5)
LYMPHOCYTES # BLD: 1.6 K/UL (ref 0.5–4.6)
LYMPHOCYTES NFR BLD: 22 % (ref 13–44)
MAGNESIUM SERPL-MCNC: 2.6 MG/DL (ref 1.8–2.4)
MCH RBC QN AUTO: 27.5 PG (ref 26.1–32.9)
MCHC RBC AUTO-ENTMCNC: 32.7 G/DL (ref 31.4–35)
MCV RBC AUTO: 84.3 FL (ref 79.6–97.8)
MONOCYTES # BLD: 0.6 K/UL (ref 0.1–1.3)
MONOCYTES NFR BLD: 9 % (ref 4–12)
NEUTS SEG # BLD: 4.9 K/UL (ref 1.7–8.2)
NEUTS SEG NFR BLD: 68 % (ref 43–78)
PHOSPHATE SERPL-MCNC: 2.3 MG/DL (ref 2.3–3.7)
PLATELET # BLD AUTO: 403 K/UL (ref 150–450)
PMV BLD AUTO: 9.8 FL (ref 10.8–14.1)
POTASSIUM SERPL-SCNC: 3.8 MMOL/L (ref 3.5–5.1)
PROT SERPL-MCNC: 8.7 G/DL (ref 6.3–8.2)
RBC # BLD AUTO: 5.3 M/UL (ref 4.23–5.67)
SODIUM SERPL-SCNC: 136 MMOL/L (ref 136–145)
WBC # BLD AUTO: 7.2 K/UL (ref 4.3–11.1)

## 2017-12-12 PROCEDURE — 81003 URINALYSIS AUTO W/O SCOPE: CPT | Performed by: EMERGENCY MEDICINE

## 2017-12-12 PROCEDURE — 74011250636 HC RX REV CODE- 250/636: Performed by: EMERGENCY MEDICINE

## 2017-12-12 PROCEDURE — 83735 ASSAY OF MAGNESIUM: CPT | Performed by: EMERGENCY MEDICINE

## 2017-12-12 PROCEDURE — 99285 EMERGENCY DEPT VISIT HI MDM: CPT | Performed by: EMERGENCY MEDICINE

## 2017-12-12 PROCEDURE — 80053 COMPREHEN METABOLIC PANEL: CPT | Performed by: EMERGENCY MEDICINE

## 2017-12-12 PROCEDURE — 85025 COMPLETE CBC W/AUTO DIFF WBC: CPT | Performed by: EMERGENCY MEDICINE

## 2017-12-12 PROCEDURE — 96360 HYDRATION IV INFUSION INIT: CPT | Performed by: EMERGENCY MEDICINE

## 2017-12-12 PROCEDURE — 74022 RADEX COMPL AQT ABD SERIES: CPT

## 2017-12-12 PROCEDURE — 84100 ASSAY OF PHOSPHORUS: CPT | Performed by: EMERGENCY MEDICINE

## 2017-12-12 RX ORDER — TIZANIDINE 4 MG/1
4 TABLET ORAL 3 TIMES DAILY
Qty: 13 TAB | Refills: 0 | Status: SHIPPED | OUTPATIENT
Start: 2017-12-12 | End: 2018-02-06

## 2017-12-12 RX ORDER — HYOSCYAMINE SULFATE 0.12 MG/1
0.25 TABLET SUBLINGUAL
Qty: 20 TAB | Refills: 0 | Status: SHIPPED | OUTPATIENT
Start: 2017-12-12 | End: 2018-02-06

## 2017-12-12 RX ORDER — ONDANSETRON 4 MG/1
4 TABLET, FILM COATED ORAL
Qty: 15 TAB | Refills: 0 | Status: SHIPPED | OUTPATIENT
Start: 2017-12-12 | End: 2018-02-06

## 2017-12-12 RX ADMIN — SODIUM CHLORIDE 1000 ML: 900 INJECTION, SOLUTION INTRAVENOUS at 09:30

## 2017-12-12 NOTE — ED PROVIDER NOTES
Patient is a 57 y.o. male presenting with cramps, leg pain, arm pain, and dehydration. The history is provided by the patient. Abdominal Cramping    This is a recurrent problem. The current episode started 2 days ago. The problem occurs constantly. The problem has not changed since onset. The pain is associated with an unknown factor. The pain is located in the LLQ, RLQ and suprapubic region. The quality of the pain is cramping. The pain is moderate. Associated symptoms include arthralgias. Pertinent negatives include no fever, no diarrhea, no nausea, no vomiting, no constipation, no dysuria, no frequency, no hematuria, no headaches, no myalgias, no chest pain and no back pain. The pain is worsened by activity. The pain is relieved by nothing. His past medical history does not include cancer, UTI or DM. The patient's surgical history non-contributory. Leg Pain    Pertinent negatives include no numbness, no back pain and no neck pain. Arm Pain    Pertinent negatives include no numbness, no back pain and no neck pain. Dehydration   Pertinent negatives include no chest pain, no abdominal pain, no headaches and no shortness of breath. Past Medical History:   Diagnosis Date    Altered gait     Arthritis     CAD (coronary artery disease)     pt denies; ekg dated 7-21-14 states \"normal ekg\"    GERD (gastroesophageal reflux disease)     occ    Hypertension     pt denies     Kidney stone     PUD (peptic ulcer disease)     1985    Smoker     approx one pack daily - 35 years        Past Surgical History:   Procedure Laterality Date    HX ORTHOPAEDIC      spine; left and right hip replacement         Family History:   Problem Relation Age of Onset    Cancer Father        Social History     Social History    Marital status: SINGLE     Spouse name: N/A    Number of children: N/A    Years of education: N/A     Occupational History    Not on file.      Social History Main Topics    Smoking status: Current Every Day Smoker     Packs/day: 1.00     Years: 35.00    Smokeless tobacco: Never Used    Alcohol use Yes      Comment: social    Drug use: Yes     Special: Marijuana, Cocaine    Sexual activity: Not on file     Other Topics Concern    Not on file     Social History Narrative         ALLERGIES: Review of patient's allergies indicates no known allergies. Review of Systems   Constitutional: Negative for activity change, chills, diaphoresis and fever. HENT: Negative for dental problem, hearing loss, nosebleeds, rhinorrhea and sore throat. Eyes: Negative for pain, discharge, redness and visual disturbance. Respiratory: Negative for cough, chest tightness and shortness of breath. Cardiovascular: Negative for chest pain, palpitations and leg swelling. Gastrointestinal: Negative for abdominal pain, constipation, diarrhea, nausea and vomiting. Endocrine: Negative for cold intolerance, heat intolerance, polydipsia and polyuria. Genitourinary: Negative for dysuria, flank pain, frequency and hematuria. Musculoskeletal: Positive for arthralgias. Negative for back pain, joint swelling, myalgias and neck pain. Skin: Negative for pallor and rash. Allergic/Immunologic: Negative for environmental allergies and food allergies. Neurological: Negative for dizziness, tremors, light-headedness, numbness and headaches. Hematological: Negative for adenopathy. Does not bruise/bleed easily. Psychiatric/Behavioral: Negative for confusion and dysphoric mood. The patient is not nervous/anxious and is not hyperactive. All other systems reviewed and are negative. Vitals:    12/12/17 0857   BP: (!) 187/108   Pulse: 100   Resp: 20   Temp: 97.4 °F (36.3 °C)   SpO2: 96%   Weight: 83.5 kg (184 lb)   Height: 6' (1.829 m)            Physical Exam   Constitutional: He is oriented to person, place, and time. He appears well-developed and well-nourished. He appears distressed.    HENT:   Head: Normocephalic and atraumatic. Mouth/Throat: Oropharynx is clear and moist. No oropharyngeal exudate. Eyes: Conjunctivae and EOM are normal. Pupils are equal, round, and reactive to light. Right eye exhibits no discharge. Left eye exhibits no discharge. No scleral icterus. Neck: Normal range of motion. Neck supple. No JVD present. Cardiovascular: Normal rate, regular rhythm, normal heart sounds and intact distal pulses. Exam reveals no gallop and no friction rub. No murmur heard. Pulmonary/Chest: Effort normal and breath sounds normal. No respiratory distress. He has no wheezes. Abdominal: Soft. Bowel sounds are normal. He exhibits no distension, no ascites and no mass. There is no hepatosplenomegaly. There is no tenderness. There is no rebound and no guarding. Musculoskeletal: Normal range of motion. He exhibits no edema or tenderness. Lymphadenopathy:     He has no cervical adenopathy. Neurological: He is alert and oriented to person, place, and time. He has normal strength. No cranial nerve deficit or sensory deficit. He exhibits normal muscle tone. GCS eye subscore is 4. GCS verbal subscore is 5. GCS motor subscore is 6. Skin: Skin is warm and dry. No rash noted. He is not diaphoretic. No erythema. Psychiatric: He has a normal mood and affect. His speech is normal and behavior is normal. Judgment and thought content normal. Cognition and memory are normal.   Nursing note and vitals reviewed.        MDM  Number of Diagnoses or Management Options  Abdominal pain, generalized: new and requires workup  Cramps, muscle, general: established and worsening  Diagnosis management comments: Ddx:  Viral syndrome, dehydration, electrolyte abnl, UTI, gastroenteritis       Amount and/or Complexity of Data Reviewed  Clinical lab tests: ordered and reviewed  Tests in the radiology section of CPT®: ordered and reviewed  Tests in the medicine section of CPT®: ordered and reviewed  Review and summarize past medical records: yes    Risk of Complications, Morbidity, and/or Mortality  Presenting problems: moderate  Diagnostic procedures: moderate  General comments: Elements of this note have been dictated via voice recognition software. Text and phrases may be limited by the accuracy of the software. The chart has been reviewed, but errors may still be present.       Patient Progress  Patient progress: improved    ED Course       Procedures

## 2017-12-12 NOTE — ED TRIAGE NOTES
Pt states he has been sick for over a week. Pt saw  [phone], given ABX and Mucinex.  Pt states he began having cramps [id number]

## 2017-12-12 NOTE — DISCHARGE INSTRUCTIONS
Abdominal Pain: Care Instructions  Your Care Instructions    Abdominal pain has many possible causes. Some aren't serious and get better on their own in a few days. Others need more testing and treatment. If your pain continues or gets worse, you need to be rechecked and may need more tests to find out what is wrong. You may need surgery to correct the problem. Don't ignore new symptoms, such as fever, nausea and vomiting, urination problems, pain that gets worse, and dizziness. These may be signs of a more serious problem. Your doctor may have recommended a follow-up visit in the next 8 to 12 hours. If you are not getting better, you may need more tests or treatment. The doctor has checked you carefully, but problems can develop later. If you notice any problems or new symptoms, get medical treatment right away. Follow-up care is a key part of your treatment and safety. Be sure to make and go to all appointments, and call your doctor if you are having problems. It's also a good idea to know your test results and keep a list of the medicines you take. How can you care for yourself at home? · Rest until you feel better. · To prevent dehydration, drink plenty of fluids, enough so that your urine is light yellow or clear like water. Choose water and other caffeine-free clear liquids until you feel better. If you have kidney, heart, or liver disease and have to limit fluids, talk with your doctor before you increase the amount of fluids you drink. · If your stomach is upset, eat mild foods, such as rice, dry toast or crackers, bananas, and applesauce. Try eating several small meals instead of two or three large ones. · Wait until 48 hours after all symptoms have gone away before you have spicy foods, alcohol, and drinks that contain caffeine. · Do not eat foods that are high in fat. · Avoid anti-inflammatory medicines such as aspirin, ibuprofen (Advil, Motrin), and naproxen (Aleve).  These can cause stomach upset. Talk to your doctor if you take daily aspirin for another health problem. When should you call for help? Call 911 anytime you think you may need emergency care. For example, call if:  ? · You passed out (lost consciousness). ? · You pass maroon or very bloody stools. ? · You vomit blood or what looks like coffee grounds. ? · You have new, severe belly pain. ?Call your doctor now or seek immediate medical care if:  ? · Your pain gets worse, especially if it becomes focused in one area of your belly. ? · You have a new or higher fever. ? · Your stools are black and look like tar, or they have streaks of blood. ? · You have unexpected vaginal bleeding. ? · You have symptoms of a urinary tract infection. These may include:  ¨ Pain when you urinate. ¨ Urinating more often than usual.  ¨ Blood in your urine. ? · You are dizzy or lightheaded, or you feel like you may faint. ? Watch closely for changes in your health, and be sure to contact your doctor if:  ? · You are not getting better after 1 day (24 hours). Where can you learn more? Go to http://jose-jason.info/. Enter C977 in the search box to learn more about \"Abdominal Pain: Care Instructions. \"  Current as of: March 20, 2017  Content Version: 11.4  © 4207-1975 [employer]. Care instructions adapted under license by [employer] (which disclaims liability or warranty for this information). If you have questions about a medical condition or this instruction, always ask your healthcare professional. [place] [address] any warranty or liability for your use of this information. Muscle Cramps: Care Instructions  Your Care Instructions    A muscle cramp occurs when a muscle tightens up suddenly. A cramp often happens in the legs. A muscle cramp is also called a muscle spasm or a charley horse. Muscle cramps usually last less than a minute.  However, the pain may last for several minutes. Leg cramps that occur at night may wake you up. Heavy exercise, dehydration, and being overweight can increase your risk of getting cramps. An imbalance of certain chemicals in your blood, called electrolytes, can also lead to muscle cramps. Pregnant women sometimes get muscle cramps during sleep. Muscle cramps can be treated by stretching and massaging the muscle. If cramps keep coming back, your doctor may prescribe medicine that relaxes your muscles. Follow-up care is a key part of your treatment and safety. Be sure to make and go to all appointments, and call your doctor if you are having problems. It's also a good idea to know your test results and keep a list of the medicines you take. How can you care for yourself at home? · Drink plenty of fluids to prevent dehydration. Choose water and other caffeine-free clear liquids until you feel better. If you have kidney, heart, or liver disease and have to limit fluids, talk with your doctor before you increase the amount of fluids you drink. · Stretch your muscles every day, especially before and after exercise and at bedtime. Regular stretching can relax your muscles and may prevent cramps. · Do not suddenly increase the amount of exercise you get. Increase your exercise a little each week. · When you get a cramp, stretch and massage the muscle. You can also take a warm shower or bath to relax the muscle. A heating pad placed on the muscle can also help. · Take a daily multivitamin supplement. · Ask your doctor if you can take an over-the-counter pain medicine, such as acetaminophen (Tylenol), ibuprofen (Advil, Motrin), or naproxen (Aleve). Be safe with medicines. Read and follow all instructions on the label. When should you call for help? Watch closely for changes in your health, and be sure to contact your doctor if:  ? · You get muscle cramps often that do not go away after home treatment.    ? · Your muscle cramps often wake you up at night. ? · You do not get better as expected. Where can you learn more? Go to http://jose-jason.info/. Enter P556 in the search box to learn more about \"Muscle Cramps: Care Instructions. \"  Current as of: March 21, 2017  Content Version: 11.4  © 8976-1174 [employer]. Care instructions adapted under license by [employer] (which disclaims liability or warranty for this information). If you have questions about a medical condition or this instruction, always ask your healthcare professional. [address] any warranty or liability for your use of this information.

## 2017-12-12 NOTE — ED NOTES
I have reviewed discharge instructions with the patient. The patient verbalized understanding. Patient left ED via Discharge Method: ambulatory to Home with self. Opportunity for questions and clarification provided. Patient given 3 scripts. To continue your aftercare when you leave the hospital, you may receive an automated call from our care team to check in on how you are doing. This is a free service and part of our promise to provide the best care and service to meet your aftercare needs.  If you have questions, or wish to unsubscribe from this service please call [phone]. Thank you for Choosing our [facility] Emergency Department.

## 2017-12-14 ENCOUNTER — HOSPITAL ENCOUNTER (OUTPATIENT)
Dept: GENERAL RADIOLOGY | Age: 62
Discharge: HOME | End: 2017-12-14
Payer: MEDICARE

## 2017-12-14 ENCOUNTER — HOSPITAL ENCOUNTER (OUTPATIENT)
Dept: LAB | Age: 62
Discharge: HOME | End: 2017-12-14
Payer: MEDICARE

## 2017-12-14 DIAGNOSIS — R10.84 GENERALIZED ABDOMINAL PAIN: ICD-10-CM

## 2017-12-14 DIAGNOSIS — R14.0 ABDOMINAL DISTENSION: ICD-10-CM

## 2017-12-14 LAB
ANION GAP SERPL CALC-SCNC: 8 MMOL/L (ref 7–16)
ANION GAP SERPL CALC-SCNC: 8 MMOL/L (ref 7–16)
BASOPHILS # BLD: 0 K/UL (ref 0–0.2)
BASOPHILS NFR BLD: 0 % (ref 0–2)
BUN SERPL-MCNC: 10 MG/DL (ref 8–23)
BUN SERPL-MCNC: 10 MG/DL (ref 8–23)
CALCIUM SERPL-MCNC: 9.3 MG/DL (ref 8.3–10.4)
CALCIUM SERPL-MCNC: 9.3 MG/DL (ref 8.3–10.4)
CHLORIDE SERPL-SCNC: 105 MMOL/L (ref 98–107)
CHLORIDE SERPL-SCNC: 105 MMOL/L (ref 98–107)
CO2 SERPL-SCNC: 25 MMOL/L (ref 21–32)
CO2 SERPL-SCNC: 25 MMOL/L (ref 21–32)
CREAT SERPL-MCNC: 1.06 MG/DL (ref 0.8–1.5)
CREAT SERPL-MCNC: 1.06 MG/DL (ref 0.8–1.5)
DIFFERENTIAL METHOD BLD: ABNORMAL
EOSINOPHIL # BLD: 0.1 K/UL (ref 0–0.8)
EOSINOPHIL NFR BLD: 1 % (ref 0.5–7.8)
ERYTHROCYTE [DISTWIDTH] IN BLOOD BY AUTOMATED COUNT: 12.4 % (ref 11.9–14.6)
GLUCOSE SERPL-MCNC: 85 MG/DL (ref 65–100)
GLUCOSE SERPL-MCNC: 85 MG/DL (ref 65–100)
HCT VFR BLD AUTO: 44.9 % (ref 41.1–50.3)
HGB BLD-MCNC: 14.9 G/DL (ref 13.6–17.2)
LYMPHOCYTES # BLD: 1.6 K/UL (ref 0.5–4.6)
LYMPHOCYTES NFR BLD: 26 % (ref 13–44)
MCH RBC QN AUTO: 28.4 PG (ref 26.1–32.9)
MCHC RBC AUTO-ENTMCNC: 33.2 G/DL (ref 31.4–35)
MCV RBC AUTO: 85.7 FL (ref 79.6–97.8)
MONOCYTES # BLD: 0.6 K/UL (ref 0.1–1.3)
MONOCYTES NFR BLD: 10 % (ref 4–12)
NEUTS SEG # BLD: 3.9 K/UL (ref 1.7–8.2)
NEUTS SEG NFR BLD: 63 % (ref 43–78)
PLATELET # BLD AUTO: 382 K/UL (ref 150–450)
PMV BLD AUTO: 9.4 FL (ref 10.8–14.1)
POTASSIUM SERPL-SCNC: 4.2 MMOL/L (ref 3.5–5.1)
POTASSIUM SERPL-SCNC: 4.2 MMOL/L (ref 3.5–5.1)
RBC # BLD AUTO: 5.24 M/UL (ref 4.23–5.67)
SODIUM SERPL-SCNC: 138 MMOL/L (ref 136–145)
SODIUM SERPL-SCNC: 138 MMOL/L (ref 136–145)
WBC # BLD AUTO: 6.2 K/UL (ref 4.3–11.1)

## 2017-12-14 PROCEDURE — 36415 COLL VENOUS BLD VENIPUNCTURE: CPT | Performed by: NURSE PRACTITIONER

## 2017-12-14 PROCEDURE — 80048 BASIC METABOLIC PNL TOTAL CA: CPT | Performed by: NURSE PRACTITIONER

## 2017-12-14 PROCEDURE — 85025 COMPLETE CBC W/AUTO DIFF WBC: CPT | Performed by: NURSE PRACTITIONER

## 2017-12-14 PROCEDURE — 74022 RADEX COMPL AQT ABD SERIES: CPT

## 2018-02-06 ENCOUNTER — HOSPITAL ENCOUNTER (EMERGENCY)
Age: 63
Discharge: HOME | End: 2018-02-06
Attending: EMERGENCY MEDICINE
Payer: MEDICARE

## 2018-02-06 VITALS
DIASTOLIC BLOOD PRESSURE: 82 MMHG | HEIGHT: 72 IN | BODY MASS INDEX: 23.57 KG/M2 | RESPIRATION RATE: 18 BRPM | WEIGHT: 174 LBS | TEMPERATURE: 98.2 F | HEART RATE: 92 BPM | OXYGEN SATURATION: 97 % | SYSTOLIC BLOOD PRESSURE: 156 MMHG

## 2018-02-06 DIAGNOSIS — R04.0 EPISTAXIS: ICD-10-CM

## 2018-02-06 DIAGNOSIS — R09.81 NASAL CONGESTION: Primary | ICD-10-CM

## 2018-02-06 PROCEDURE — 99282 EMERGENCY DEPT VISIT SF MDM: CPT | Performed by: NURSE PRACTITIONER

## 2018-02-06 RX ORDER — AMOXICILLIN 500 MG/1
500 CAPSULE ORAL
COMMUNITY
End: 2018-03-09 | Stop reason: CLARIF

## 2018-02-06 NOTE — ED NOTES
I have reviewed discharge instructions with the patient. The patient verbalized understanding. Patient left ED via Discharge Method: ambulatory to Home with (insert name of family/friend, self, transport self). Opportunity for questions and clarification provided. Patient given 0 scripts. To continue your aftercare when you leave the hospital, you may receive an automated call from our care team to check in on how you are doing. This is a free service and part of our promise to provide the best care and service to meet your aftercare needs.  If you have questions, or wish to unsubscribe from this service please call [phone]. Thank you for Choosing our [facility] Emergency Department.

## 2018-02-06 NOTE — ED PROVIDER NOTES
HPI Comments: Patient states he had a nosebleed this morning. There is no bleeding at this time. Patient states he has been having problems with nasal congestion. He was sleeping when I entered the room. Patient is a 57 y.o. male presenting with epistaxis. The history is provided by the patient. Epistaxis    This is a new problem. The current episode started 12 to 24 hours ago. The problem has been resolved. The problem is associated with nothing. The bleeding has been from both nares. He has tried nothing for the symptoms. Past Medical History:   Diagnosis Date    Altered gait     Arthritis     CAD (coronary artery disease)     pt denies; ekg dated 7-21-14 states \"normal ekg\"    GERD (gastroesophageal reflux disease)     occ    Hypertension     pt denies     Kidney stone     PUD (peptic ulcer disease)     1985    Smoker     approx one pack daily - 35 years        Past Surgical History:   Procedure Laterality Date    HX ORTHOPAEDIC      spine; left and right hip replacement         Family History:   Problem Relation Age of Onset    Cancer Father        Social History     Social History    Marital status: SINGLE     Spouse name: N/A    Number of children: N/A    Years of education: N/A     Occupational History    Not on file. Social History Main Topics    Smoking status: Current Every Day Smoker     Packs/day: 1.00     Years: 35.00    Smokeless tobacco: Never Used    Alcohol use Yes      Comment: social    Drug use: Yes     Special: Marijuana, Cocaine    Sexual activity: Not on file     Other Topics Concern    Not on file     Social History Narrative         ALLERGIES: Review of patient's allergies indicates no known allergies. Review of Systems   Constitutional: Negative for chills and fever. HENT: Positive for congestion and nosebleeds. Respiratory: Negative for cough and shortness of breath.     Gastrointestinal: Negative for abdominal pain, diarrhea, nausea and vomiting. Genitourinary: Negative for difficulty urinating. Musculoskeletal: Negative for arthralgias and myalgias. Skin: Negative for color change. Neurological: Negative for dizziness and weakness. Vitals:    02/06/18 1229   BP: 156/82   Pulse: 92   Resp: 18   Temp: 98.2 °F (36.8 °C)   SpO2: 97%   Weight: 78.9 kg (174 lb)   Height: 6' (1.829 m)            Physical Exam   Constitutional: He is oriented to person, place, and time. No distress. HENT:   Nose: Mucosal edema present. No epistaxis. Mouth/Throat: Uvula is midline and mucous membranes are normal. Posterior oropharyngeal erythema present. Cardiovascular: Normal rate and regular rhythm. No murmur heard. Pulmonary/Chest: Effort normal and breath sounds normal.   Neurological: He is alert and oriented to person, place, and time. Skin: Skin is warm and dry. He is not diaphoretic. No erythema. Psychiatric: He has a normal mood and affect. His behavior is normal.   Nursing note and vitals reviewed.        MDM  Number of Diagnoses or Management Options  Epistaxis:   Nasal congestion:   Risk of Complications, Morbidity, and/or Mortality  Presenting problems: minimal  Diagnostic procedures: minimal  Management options: minimal    Patient Progress  Patient progress: stable        ED Course       Procedures

## 2018-02-06 NOTE — DISCHARGE INSTRUCTIONS
Nosebleeds: Care Instructions  Your Care Instructions    Nosebleeds are common, especially if you have colds or allergies. Many things can cause a nosebleed. Some nosebleeds stop on their own with pressure. Others need packing. Some get cauterized (sealed). If you have gauze or other packing materials in your nose, you will need to follow up with your doctor to have the packing removed. You may need more treatment if you get nosebleeds a lot. The doctor has checked you carefully, but problems can develop later. If you notice any problems or new symptoms, get medical treatment right away. Follow-up care is a key part of your treatment and safety. Be sure to make and go to all appointments, and call your doctor if you are having problems. It's also a good idea to know your test results and keep a list of the medicines you take. How can you care for yourself at home? · If you get another nosebleed:  ¨ Sit up and tilt your head slightly forward. This keeps blood from going down your throat. ¨ Use your thumb and index finger to pinch your nose shut for 10 minutes. Use a clock. Do not check to see if the bleeding has stopped before the 10 minutes are up. If the bleeding has not stopped, pinch your nose shut for another 10 minutes. ¨ When the bleeding has stopped, try not to pick, rub, or blow your nose for 12 hours. Avoiding these things helps keep your nose from bleeding again. · If your doctor prescribed antibiotics, take them as directed. Do not stop taking them just because you feel better. You need to take the full course of antibiotics. To prevent nosebleeds  · Do not blow your nose too hard. · Try not to lift or strain after a nosebleed. · Raise your head on a pillow while you sleep. · Put a thin layer of a saline- or water-based nasal gel, such as NasoGel, inside your nose. Put it on the septum, which divides your nostrils. This will prevent dryness that can cause nosebleeds.   · Use a vaporizer or humidifier to add moisture to your bedroom. Follow the directions for cleaning the machine. · Do not use aspirin, ibuprofen (Advil, Motrin), or naproxen (Aleve) for 36 to 48 hours after a nosebleed unless your doctor tells you to. You can use acetaminophen (Tylenol) for pain relief. · Talk to your doctor about stopping any other medicines you are taking. Some medicines may make you more likely to get a nosebleed. · Do not use cold medicines or nasal sprays without first talking to your doctor. They can make your nose dry. When should you call for help? Call 911 anytime you think you may need emergency care. For example, call if:  ? · You passed out (lost consciousness). ?Call your doctor now or seek immediate medical care if:  ? · You get another nosebleed and your nose is still bleeding after you have applied pressure 3 times for 10 minutes each time (30 minutes total). ? · There is a lot of blood running down the back of your throat even after you pinch your nose and tilt your head forward. ? · You have a fever. ? · You have sinus pain. ? Watch closely for changes in your health, and be sure to contact your doctor if:  ? · You get nosebleeds often, even if they stop. ? · You do not get better as expected. Where can you learn more? Go to http://jose-jason.info/. Enter S156 in the search box to learn more about \"Nosebleeds: Care Instructions. \"  Current as of: March 20, 2017  Content Version: 11.4  © 4612-7988 [employer]. Care instructions adapted under license by [employer] (which disclaims liability or warranty for this information). If you have questions about a medical condition or this instruction, always ask your healthcare professional. [address] any warranty or liability for your use of this information.

## 2018-02-06 NOTE — ED TRIAGE NOTES
Patient complains of a nose bleeding. No bleeding noted in triage. Patient has been found in multiple lobbies today sleeping. Patient states he is tired.

## 2018-03-09 ENCOUNTER — HOSPITAL ENCOUNTER (EMERGENCY)
Age: 63
Discharge: HOME | End: 2018-03-09
Attending: EMERGENCY MEDICINE
Payer: MEDICARE

## 2018-03-09 ENCOUNTER — APPOINTMENT (OUTPATIENT)
Dept: GENERAL RADIOLOGY | Age: 63
End: 2018-03-09
Attending: EMERGENCY MEDICINE
Payer: MEDICARE

## 2018-03-09 ENCOUNTER — APPOINTMENT (OUTPATIENT)
Dept: CT IMAGING | Age: 63
End: 2018-03-09
Attending: EMERGENCY MEDICINE
Payer: MEDICARE

## 2018-03-09 VITALS
WEIGHT: 160 LBS | TEMPERATURE: 98.2 F | HEIGHT: 71 IN | DIASTOLIC BLOOD PRESSURE: 87 MMHG | RESPIRATION RATE: 22 BRPM | OXYGEN SATURATION: 98 % | HEART RATE: 102 BPM | SYSTOLIC BLOOD PRESSURE: 147 MMHG | BODY MASS INDEX: 22.4 KG/M2

## 2018-03-09 DIAGNOSIS — K29.00 ACUTE GASTRITIS WITHOUT HEMORRHAGE, UNSPECIFIED GASTRITIS TYPE: Primary | ICD-10-CM

## 2018-03-09 LAB
ALBUMIN SERPL-MCNC: 3.5 G/DL (ref 3.2–4.6)
ALBUMIN/GLOB SERPL: 0.7 {RATIO} (ref 1.2–3.5)
ALP SERPL-CCNC: 105 U/L (ref 50–136)
ALT SERPL-CCNC: 33 U/L (ref 12–65)
ANION GAP SERPL CALC-SCNC: 12 MMOL/L (ref 7–16)
AST SERPL-CCNC: 27 U/L (ref 15–37)
ATRIAL RATE: 107 BPM
BASOPHILS # BLD: 0 K/UL (ref 0–0.2)
BASOPHILS NFR BLD: 0 % (ref 0–2)
BILIRUB SERPL-MCNC: 0.7 MG/DL (ref 0.2–1.1)
BUN SERPL-MCNC: 12 MG/DL (ref 8–23)
CALCIUM SERPL-MCNC: 9.8 MG/DL (ref 8.3–10.4)
CALCULATED P AXIS, ECG09: 64 DEGREES
CALCULATED R AXIS, ECG10: 25 DEGREES
CALCULATED T AXIS, ECG11: 67 DEGREES
CHLORIDE SERPL-SCNC: 102 MMOL/L (ref 98–107)
CO2 SERPL-SCNC: 27 MMOL/L (ref 21–32)
CREAT SERPL-MCNC: 1.18 MG/DL (ref 0.8–1.5)
CRP SERPL-MCNC: 10.4 MG/DL (ref 0–0.9)
D DIMER PPP FEU-MCNC: 0.54 UG/ML(FEU)
DIAGNOSIS, 93000: NORMAL
DIFFERENTIAL METHOD BLD: ABNORMAL
EOSINOPHIL # BLD: 0.1 K/UL (ref 0–0.8)
EOSINOPHIL NFR BLD: 1 % (ref 0.5–7.8)
ERYTHROCYTE [DISTWIDTH] IN BLOOD BY AUTOMATED COUNT: 15.3 % (ref 11.9–14.6)
GLOBULIN SER CALC-MCNC: 5.2 G/DL (ref 2.3–3.5)
GLUCOSE SERPL-MCNC: 108 MG/DL (ref 65–100)
HCT VFR BLD AUTO: 48 % (ref 41.1–50.3)
HGB BLD-MCNC: 15.4 G/DL (ref 13.6–17.2)
IMM GRANULOCYTES # BLD: 0 K/UL (ref 0–0.5)
IMM GRANULOCYTES NFR BLD AUTO: 0 % (ref 0–5)
LIPASE SERPL-CCNC: 114 U/L (ref 73–393)
LYMPHOCYTES # BLD: 1.6 K/UL (ref 0.5–4.6)
LYMPHOCYTES NFR BLD: 14 % (ref 13–44)
MCH RBC QN AUTO: 28.4 PG (ref 26.1–32.9)
MCHC RBC AUTO-ENTMCNC: 32.1 G/DL (ref 31.4–35)
MCV RBC AUTO: 88.4 FL (ref 79.6–97.8)
MONOCYTES # BLD: 1.1 K/UL (ref 0.1–1.3)
MONOCYTES NFR BLD: 9 % (ref 4–12)
NEUTS SEG # BLD: 8.6 K/UL (ref 1.7–8.2)
NEUTS SEG NFR BLD: 76 % (ref 43–78)
P-R INTERVAL, ECG05: 128 MS
PLATELET # BLD AUTO: 295 K/UL (ref 150–450)
PMV BLD AUTO: 10.3 FL (ref 10.8–14.1)
POTASSIUM SERPL-SCNC: 3.6 MMOL/L (ref 3.5–5.1)
PROT SERPL-MCNC: 8.7 G/DL (ref 6.3–8.2)
Q-T INTERVAL, ECG07: 318 MS
QRS DURATION, ECG06: 92 MS
QTC CALCULATION (BEZET), ECG08: 424 MS
RBC # BLD AUTO: 5.43 M/UL (ref 4.23–5.67)
SODIUM SERPL-SCNC: 141 MMOL/L (ref 136–145)
TROPONIN I BLD-MCNC: 0 NG/ML (ref 0.02–0.05)
VENTRICULAR RATE, ECG03: 107 BPM
WBC # BLD AUTO: 11.4 K/UL (ref 4.3–11.1)

## 2018-03-09 PROCEDURE — 74011636320 HC RX REV CODE- 636/320: Performed by: EMERGENCY MEDICINE

## 2018-03-09 PROCEDURE — 80053 COMPREHEN METABOLIC PANEL: CPT | Performed by: EMERGENCY MEDICINE

## 2018-03-09 PROCEDURE — 74011000258 HC RX REV CODE- 258: Performed by: EMERGENCY MEDICINE

## 2018-03-09 PROCEDURE — 85379 FIBRIN DEGRADATION QUANT: CPT | Performed by: EMERGENCY MEDICINE

## 2018-03-09 PROCEDURE — 93005 ELECTROCARDIOGRAM TRACING: CPT | Performed by: EMERGENCY MEDICINE

## 2018-03-09 PROCEDURE — 85025 COMPLETE CBC W/AUTO DIFF WBC: CPT | Performed by: EMERGENCY MEDICINE

## 2018-03-09 PROCEDURE — 83690 ASSAY OF LIPASE: CPT | Performed by: EMERGENCY MEDICINE

## 2018-03-09 PROCEDURE — 74011250637 HC RX REV CODE- 250/637: Performed by: EMERGENCY MEDICINE

## 2018-03-09 PROCEDURE — 99284 EMERGENCY DEPT VISIT MOD MDM: CPT | Performed by: EMERGENCY MEDICINE

## 2018-03-09 PROCEDURE — 74177 CT ABD & PELVIS W/CONTRAST: CPT

## 2018-03-09 PROCEDURE — 84484 ASSAY OF TROPONIN QUANT: CPT

## 2018-03-09 PROCEDURE — 86140 C-REACTIVE PROTEIN: CPT | Performed by: EMERGENCY MEDICINE

## 2018-03-09 PROCEDURE — 96374 THER/PROPH/DIAG INJ IV PUSH: CPT | Performed by: EMERGENCY MEDICINE

## 2018-03-09 PROCEDURE — 74011250636 HC RX REV CODE- 250/636: Performed by: EMERGENCY MEDICINE

## 2018-03-09 PROCEDURE — 71046 X-RAY EXAM CHEST 2 VIEWS: CPT

## 2018-03-09 RX ORDER — SODIUM CHLORIDE 0.9 % (FLUSH) 0.9 %
5-10 SYRINGE (ML) INJECTION AS NEEDED
Status: DISCONTINUED | OUTPATIENT
Start: 2018-03-09 | End: 2018-03-09 | Stop reason: HOSPADM

## 2018-03-09 RX ORDER — SUCRALFATE 1 G/10ML
1 SUSPENSION ORAL
Status: COMPLETED | OUTPATIENT
Start: 2018-03-09 | End: 2018-03-09

## 2018-03-09 RX ORDER — SODIUM CHLORIDE 0.9 % (FLUSH) 0.9 %
5-10 SYRINGE (ML) INJECTION EVERY 8 HOURS
Status: DISCONTINUED | OUTPATIENT
Start: 2018-03-09 | End: 2018-03-09 | Stop reason: HOSPADM

## 2018-03-09 RX ORDER — KETOROLAC TROMETHAMINE 30 MG/ML
30 INJECTION, SOLUTION INTRAMUSCULAR; INTRAVENOUS
Status: COMPLETED | OUTPATIENT
Start: 2018-03-09 | End: 2018-03-09

## 2018-03-09 RX ORDER — SODIUM CHLORIDE 0.9 % (FLUSH) 0.9 %
10 SYRINGE (ML) INJECTION
Status: COMPLETED | OUTPATIENT
Start: 2018-03-09 | End: 2018-03-09

## 2018-03-09 RX ORDER — SUCRALFATE 1 G/10ML
1 SUSPENSION ORAL 4 TIMES DAILY
Qty: 420 ML | Refills: 0 | Status: SHIPPED | OUTPATIENT
Start: 2018-03-09 | End: 2018-09-04

## 2018-03-09 RX ADMIN — DIATRIZOATE MEGLUMINE AND DIATRIZOATE SODIUM 15 ML: 660; 100 LIQUID ORAL; RECTAL at 17:40

## 2018-03-09 RX ADMIN — KETOROLAC TROMETHAMINE 30 MG: 30 INJECTION, SOLUTION INTRAMUSCULAR at 16:50

## 2018-03-09 RX ADMIN — Medication 10 ML: at 19:26

## 2018-03-09 RX ADMIN — SUCRALFATE 1 G: 1 SUSPENSION ORAL at 20:28

## 2018-03-09 RX ADMIN — SODIUM CHLORIDE 100 ML: 900 INJECTION, SOLUTION INTRAVENOUS at 19:26

## 2018-03-09 RX ADMIN — IOPAMIDOL 100 ML: 755 INJECTION, SOLUTION INTRAVENOUS at 19:26

## 2018-03-09 RX ADMIN — Medication 5 ML: at 16:51

## 2018-03-09 NOTE — ED TRIAGE NOTES
Pt states chest pain started this morning that woke him up around 0900. Pt denies cardiac hx. Pt has had a cough for a few days.

## 2018-03-09 NOTE — ED NOTES
Patient given Gastroview with instructions to alert staff when he has finished drinking it. Pt states that the understands. Patient states continues to fall asleep while I speak with him. I keep waking him up. I asked him why he was so tired, and he stated \"shit man! Im old! I been up since nine this morning\".

## 2018-03-09 NOTE — ED PROVIDER NOTES
HPI Comments: Patient presents emergently from complaining of the epigastric to lower retrosternal chest pain that started about 9:00 this morning when he awoke from sleep. He states the pain has been present constantly ever since which is now 7 hours later. The pain is very sharp and severe rating 9/10 in intensity and non-radiating pain is worsened with position changes and deep inspiration he reports similar occurrence in the past relating it to \"fluid around my heart\". Review of visits indicate several visits to the ER for chest pain but no admissions to the hospital.  The patient has no significant DVT/PE risk factors noted and then he denies any concurrent cough congestion or other illness. Patient is a 57 y.o. male presenting with chest pain. The history is provided by the patient. Chest Pain    This is a new problem. The current episode started 6 to 12 hours ago. The problem has not changed since onset. Duration of episode(s) is 7 hours. The problem occurs constantly. The pain is associated with normal activity and rest. The pain is present in the substernal region and epigastric region. The pain is at a severity of 9/10. The pain is severe. The quality of the pain is described as sharp and stabbing. The pain does not radiate. The symptoms are aggravated by movement, palpation and deep breathing. Associated symptoms include abdominal pain. Pertinent negatives include no back pain, no claudication, no cough, no diaphoresis, no dizziness, no exertional chest pressure, no fever, no headaches, no hemoptysis, no irregular heartbeat, no leg pain, no lower extremity edema, no malaise/fatigue, no nausea, no numbness, no orthopnea, no palpitations, no PND, no shortness of breath, no sputum production, no vomiting and no weakness. He has tried nothing for the symptoms. The treatment provided no relief. Risk factors include smoking/tobacco exposure, male gender and hypertension.  Pertinent negatives include no cardiac catheterization. Past Medical History:   Diagnosis Date    Altered gait     Arthritis     CAD (coronary artery disease)     pt denies; ekg dated 7-21-14 states \"normal ekg\"    GERD (gastroesophageal reflux disease)     occ    Hypertension     pt denies     Kidney stone     PUD (peptic ulcer disease)     1985    Smoker     approx one pack daily - 35 years        Past Surgical History:   Procedure Laterality Date    HX ORTHOPAEDIC      spine; left and right hip replacement         Family History:   Problem Relation Age of Onset    Cancer Father        Social History     Social History    Marital status: SINGLE     Spouse name: N/A    Number of children: N/A    Years of education: N/A     Occupational History    Not on file. Social History Main Topics    Smoking status: Current Every Day Smoker     Packs/day: 1.00     Years: 35.00    Smokeless tobacco: Never Used    Alcohol use Yes      Comment: social    Drug use: Yes     Special: Marijuana, Cocaine    Sexual activity: Not on file     Other Topics Concern    Not on file     Social History Narrative         ALLERGIES: Review of patient's allergies indicates no known allergies. Review of Systems   Constitutional: Negative for diaphoresis, fever and malaise/fatigue. Respiratory: Negative for cough, hemoptysis, sputum production and shortness of breath. Cardiovascular: Positive for chest pain. Negative for palpitations, orthopnea, claudication and PND. Gastrointestinal: Positive for abdominal pain. Negative for nausea and vomiting. Musculoskeletal: Negative for back pain. Neurological: Negative for dizziness, weakness, numbness and headaches. All other systems reviewed and are negative.       Vitals:    03/09/18 1614   BP: (!) 153/94   Pulse: (!) 124   Resp: 22   Temp: 98.2 °F (36.8 °C)   SpO2: 98%   Weight: (!) 481.3 kg (1061 lb)   Height: 5' 11\" (1.803 m)            Physical Exam   Constitutional: He is oriented to person, place, and time. He appears well-developed and well-nourished. No distress. HENT:   Head: Normocephalic and atraumatic. Mouth/Throat: No oropharyngeal exudate. Eyes: Conjunctivae and EOM are normal. Pupils are equal, round, and reactive to light. Neck: Normal range of motion. Neck supple. Cardiovascular: Normal rate, regular rhythm and normal heart sounds. Pulmonary/Chest: Effort normal and breath sounds normal.   Abdominal: Bowel sounds are normal. He exhibits no distension and no mass. There is tenderness. There is no rebound and no guarding. Musculoskeletal: Normal range of motion. He exhibits no edema or deformity. Neurological: He is alert and oriented to person, place, and time. Skin: Skin is warm and dry. Psychiatric: His behavior is normal.   Nursing note and vitals reviewed. MDM  Number of Diagnoses or Management Options  Diagnosis management comments: Given the location of the patient's pain and tenderness the differential diagnosis includes gastroenterological etiologies is in addition to cardiac or pulmonary. Amount and/or Complexity of Data Reviewed  Clinical lab tests: ordered and reviewed  Tests in the radiology section of CPT®: ordered and reviewed  Independent visualization of images, tracings, or specimens: yes (EKG at 3:57 PM: Sinus tachycardia with a rate of 107 left ventricular hypertrophy by electrical criteria poor R-wave progression significant change from July 2017)    Risk of Complications, Morbidity, and/or Mortality  Presenting problems: high  Diagnostic procedures: moderate  Management options: moderate    Patient Progress  Patient progress: stable        ED Course       Procedures      Recheck: Patient resting comfortably was actually napping when I came to the bedside to discuss lab results. He is symptomatically improved.   No evidence of cardiac ischemia is noted based on the troponin and EKG and blood work otherwise unremarkable except for significantly elevated CRP. Given the patient's persistent epigastric tenderness a CT of the abdomen and pelvis will be obtained. Recheck after CAT scan which was negative: Patient still sleeping  Comfortably intermittently. As the CT is negative and the duration of the discomfort with a negative cardiac workup rules out cardiac etiology. Patient will be discharged today to [facility].

## 2018-03-10 NOTE — ED NOTES
I have reviewed discharge instructions with the patient. The patient verbalized understanding. Patient left ED via Discharge Method: ambulatory to Home with self    Opportunity for questions and clarification provided. Patient given 1 scripts. To continue your aftercare when you leave the hospital, you may receive an automated call from our care team to check in on how you are doing. This is a free service and part of our promise to provide the best care and service to meet your aftercare needs.  If you have questions, or wish to unsubscribe from this service please call [phone]. Thank you for Choosing our [facility] Form Emergency Department.

## 2019-02-27 ENCOUNTER — HOSPITAL ENCOUNTER (EMERGENCY)
Age: 64
Discharge: HOME | End: 2019-02-27
Attending: EMERGENCY MEDICINE
Payer: MEDICARE

## 2019-02-27 ENCOUNTER — APPOINTMENT (OUTPATIENT)
Dept: GENERAL RADIOLOGY | Age: 64
End: 2019-02-27
Attending: EMERGENCY MEDICINE
Payer: MEDICARE

## 2019-02-27 VITALS
HEART RATE: 84 BPM | SYSTOLIC BLOOD PRESSURE: 154 MMHG | DIASTOLIC BLOOD PRESSURE: 80 MMHG | TEMPERATURE: 98.4 F | RESPIRATION RATE: 20 BRPM | OXYGEN SATURATION: 97 %

## 2019-02-27 DIAGNOSIS — S20.212A RIB CONTUSION, LEFT, INITIAL ENCOUNTER: Primary | ICD-10-CM

## 2019-02-27 PROCEDURE — 74011250636 HC RX REV CODE- 250/636: Performed by: EMERGENCY MEDICINE

## 2019-02-27 PROCEDURE — 99283 EMERGENCY DEPT VISIT LOW MDM: CPT | Performed by: EMERGENCY MEDICINE

## 2019-02-27 PROCEDURE — 96372 THER/PROPH/DIAG INJ SC/IM: CPT | Performed by: EMERGENCY MEDICINE

## 2019-02-27 PROCEDURE — 71100 X-RAY EXAM RIBS UNI 2 VIEWS: CPT

## 2019-02-27 PROCEDURE — 74011250637 HC RX REV CODE- 250/637: Performed by: EMERGENCY MEDICINE

## 2019-02-27 RX ORDER — KETOROLAC TROMETHAMINE 30 MG/ML
15 INJECTION, SOLUTION INTRAMUSCULAR; INTRAVENOUS
Status: COMPLETED | OUTPATIENT
Start: 2019-02-27 | End: 2019-02-27

## 2019-02-27 RX ORDER — ACETAMINOPHEN 500 MG
1000 TABLET ORAL
Status: COMPLETED | OUTPATIENT
Start: 2019-02-27 | End: 2019-02-27

## 2019-02-27 RX ORDER — MORPHINE SULFATE 15 MG/1
7.5 TABLET ORAL
Qty: 7 TAB | Refills: 0 | Status: SHIPPED | OUTPATIENT
Start: 2019-02-27 | End: 2019-03-01

## 2019-02-27 RX ADMIN — KETOROLAC TROMETHAMINE 15 MG: 30 INJECTION, SOLUTION INTRAMUSCULAR at 23:15

## 2019-02-27 RX ADMIN — ACETAMINOPHEN 1000 MG: 500 TABLET, FILM COATED ORAL at 23:16

## 2019-02-28 NOTE — ED PROVIDER NOTES
Left rib pain after slipping and injuring her left ribs on the edge of the tub The history is provided by the patient. Fall The accident occurred 3 to 5 hours ago. Fall occurred: Slipped while getting out of the bathtub and struck left ribs on the edge of the tub. Pertinent negatives include no numbness. Rib Pain Associated symptoms include chest pain (left-sided rib pain). Pertinent negatives include no neck pain. Past Medical History:  
Diagnosis Date  Altered gait  Arthritis  CAD (coronary artery disease)   
 pt denies; ekg dated 7-21-14 states \"normal ekg\"  GERD (gastroesophageal reflux disease)   
 occ  History of illicit drug use  Hypertension   
 pt denies  Kidney stone  Mitral valve regurgitation 06/08/2011 1+  PUD (peptic ulcer disease) 1985  Smoker   
 approx one pack daily - 35 years Past Surgical History:  
Procedure Laterality Date  HX COLONOSCOPY    
 HX HIP REPLACEMENT Right 2009 Dr Raenell Schwab  HX HIP REPLACEMENT Left 2007  HX ORTHOPAEDIC  1998  
 cervical spine fusion, dr Ej Landaverde Family History:  
Problem Relation Age of Onset  Cancer Father Social History Socioeconomic History  Marital status: SINGLE Spouse name: Not on file  Number of children: Not on file  Years of education: Not on file  Highest education level: Not on file Social Needs  Financial resource strain: Not on file  Food insecurity - worry: Not on file  Food insecurity - inability: Not on file  Transportation needs - medical: Not on file  Transportation needs - non-medical: Not on file Occupational History  Not on file Tobacco Use  Smoking status: Current Every Day Smoker Packs/day: 1.00 Years: 35.00 Pack years: 35.00  Smokeless tobacco: Never Used Substance and Sexual Activity  Alcohol use: Yes Comment: social  
 Drug use: Yes Types: Marijuana, Cocaine  Sexual activity: Not on file Other Topics Concern  Not on file Social History Narrative  Not on file ALLERGIES: Patient has no known allergies. Review of Systems Respiratory: Negative for shortness of breath. Cardiovascular: Positive for chest pain (left-sided rib pain). Musculoskeletal: Negative for back pain and neck pain. Neurological: Negative for weakness and numbness. Vitals:  
 02/27/19 2048 BP: (!) 160/95 Pulse: 95 Resp: 18 Temp: 98.6 °F (37 °C) SpO2: 96% Physical Exam  
Constitutional: He appears well-developed and well-nourished. HENT:  
Head: Atraumatic. Neck: Full passive range of motion without pain. No spinous process tenderness and no muscular tenderness present. Cardiovascular: Normal rate, regular rhythm and normal heart sounds. Pulmonary/Chest: Effort normal and breath sounds normal. He exhibits tenderness (left anterolateral chest wall tenderness, no bruising noted). Abdominal: Soft. He exhibits no distension. There is no tenderness. There is no rebound and no guarding. No left upper quadrant or other abdominal tenderness present Musculoskeletal: Normal range of motion. He exhibits no tenderness. Neurological: He is alert. Nursing note and vitals reviewed. MDM Number of Diagnoses or Management Options Diagnosis management comments: X-ray negative for fracture. Medical exam equivocal for fracture. I believe one to 2 days of narcotic pain medication is not unreasonable. Review of records does not reveal chronic opiate seeking or frequent pain visits. Patient recently referred to orthopedics for right hip avascular necrosis P Amount and/or Complexity of Data Reviewed Tests in the radiology section of CPT®: ordered and reviewed (Xr Ribs Lt Uni 2 V Result Date: 2/27/2019 EXAM: Left rib x-rays. INDICATION: Pain after falling. COMPARISON: None. TECHNIQUE: 4 dedicated views of the left ribs were obtained. FINDINGS: No displaced left rib fracture is identified. There is mild scarring or atelectasis in the left lung base. No pneumothorax or pleural effusion is seen. IMPRESSION: No displaced rib fracture or pneumothorax. ) Review and summarize past medical records: yes Procedures

## 2019-02-28 NOTE — ED NOTES
Pt instructed to call for a ride due to his pain and unsteady gait. Pt taken to waiting room in a w/c awaiting ride

## 2019-02-28 NOTE — DISCHARGE INSTRUCTIONS
Patient Education   Tylenol 501,000 mg every 6 hours for pain. Ibuprofen 400 mg every 6 hours for pain. Alternate these every 3-4 hours for best results. Morphine one half tablet to one tablet every 6 hours if needed for severe breakthrough pain for 1-2 days only. Ice to the sore area for 15-20 minutes every 4 hours while awake for 2-3 days then change to heat for several days. Follow-up with your primary care doctor in 2-4 weeks if not improving for further evaluation. Return if any new, worsening or concerning symptoms. Bruised Rib: Care Instructions  Overview    You can get a bruised rib if you fall or get hit, such as in an accident or while playing sports. The medical term for a bruise is \"contusion. \" Small blood vessels get torn and leak blood under the skin. Most people think of a bruise as a black-and-blue area. But bones and muscles can also get bruised. An injury may damage the rib but not cause a bruise that you can see. Sometimes it can be hard to tell if a rib is bruised or broken. The symptoms may be the same. And a broken bone can't always be seen on an X-ray. But the treatment for a bruised rib is often the same as treatment for a broken one. An injury to the ribs can cause pain. The pain may be worse when you breathe deeply, cough, or sneeze. In most cases, a bruised rib will heal on its own. You can take pain medicine while the rib mends. Pain relief allows you to take deep breaths. Follow-up care is a key part of your treatment and safety. Be sure to make and go to all appointments, and call your doctor if you are having problems. It's also a good idea to know your test results and keep a list of the medicines you take. How can you care for yourself at home? · Rest and protect the injured or sore area. Stop, change, or take a break from any activity that causes pain. · Put ice or a cold pack on the area for 10 to 20 minutes at a time.  Put a thin cloth between the ice and your skin.  · After 2 or 3 days, if your swelling is gone, put a heating pad set on low or a warm cloth on your chest. Some doctors suggest that you go back and forth between hot and cold. Put a thin cloth between the heating pad and your skin. · Ask your doctor if you can take an over-the-counter pain medicine, such as acetaminophen (Tylenol), ibuprofen (Advil, Motrin), or naproxen (Aleve). Be safe with medicines. Read and follow all instructions on the label. · As your pain gets better, slowly return to your normal activities. Be patient. Rib bruises can take weeks or months to heal. If the pain gets worse, it may be a sign that you need to rest a while longer. When should you call for help? [id number] anytime you think you may need emergency care. For example, call if:    · You have severe trouble breathing.     Call your doctor now or seek immediate medical care if:    · You have trouble breathing.     · You have a fever.     · You have a new or worse cough.     · You have new or worse pain.    Watch closely for changes in your health, and be sure to contact your doctor if:    · You do not get better as expected. Where can you learn more? Go to http://jose-jason.info/. Enter R125 in the search box to learn more about \"Bruised Rib: Care Instructions. \"  Current as of: September 23, 2018  Content Version: 11.9  © 1376-[address]. Care instructions adapted under license by [employer] (which disclaims liability or warranty for this information). If you have questions about a medical condition or this instruction, always ask your healthcare professional. [place] [address] any warranty or liability for your use of this information.

## 2019-02-28 NOTE — ED NOTES
I have reviewed discharge instructions with the patient. The patient verbalized understanding. Patient left ED via Discharge Method: ambulatory with the use of a cane to Home with (friend). Opportunity for questions and clarification provided. Patient given 1 scripts. To continue your aftercare when you leave the hospital, you may receive an automated call from our care team to check in on how you are doing. This is a free service and part of our promise to provide the best care and service to meet your aftercare needs.  If you have questions, or wish to unsubscribe from this service please call [phone]. Thank you for Choosing our [facility] [employer] [facility] Emergency Department.

## 2019-08-09 ENCOUNTER — HOSPITAL ENCOUNTER (OUTPATIENT)
Dept: GENERAL RADIOLOGY | Age: 64
Discharge: HOME | End: 2019-08-09

## 2019-08-09 DIAGNOSIS — R05.9 COUGH: ICD-10-CM

## 2020-02-02 ENCOUNTER — APPOINTMENT (OUTPATIENT)
Dept: GENERAL RADIOLOGY | Age: 65
End: 2020-02-02
Attending: EMERGENCY MEDICINE
Payer: MEDICARE

## 2020-02-02 ENCOUNTER — HOSPITAL ENCOUNTER (EMERGENCY)
Age: 65
Discharge: HOME | End: 2020-02-02
Attending: EMERGENCY MEDICINE
Payer: MEDICARE

## 2020-02-02 VITALS
BODY MASS INDEX: 21.67 KG/M2 | WEIGHT: 160 LBS | HEIGHT: 72 IN | HEART RATE: 100 BPM | OXYGEN SATURATION: 95 % | RESPIRATION RATE: 16 BRPM | SYSTOLIC BLOOD PRESSURE: 164 MMHG | TEMPERATURE: 99.4 F | DIASTOLIC BLOOD PRESSURE: 85 MMHG

## 2020-02-02 DIAGNOSIS — K59.00 CONSTIPATION, UNSPECIFIED CONSTIPATION TYPE: ICD-10-CM

## 2020-02-02 DIAGNOSIS — R10.9 NONSPECIFIC ABDOMINAL PAIN: Primary | ICD-10-CM

## 2020-02-02 LAB
ALBUMIN SERPL-MCNC: 3.8 G/DL (ref 3.2–4.6)
ALBUMIN/GLOB SERPL: 0.8 {RATIO} (ref 1.2–3.5)
ALP SERPL-CCNC: 90 U/L (ref 50–136)
ALT SERPL-CCNC: 23 U/L (ref 12–65)
ANION GAP SERPL CALC-SCNC: 7 MMOL/L (ref 7–16)
AST SERPL-CCNC: 17 U/L (ref 15–37)
BASOPHILS # BLD: 0.1 K/UL (ref 0–0.2)
BASOPHILS NFR BLD: 1 % (ref 0–2)
BILIRUB SERPL-MCNC: 0.4 MG/DL (ref 0.2–1.1)
BUN SERPL-MCNC: 9 MG/DL (ref 8–23)
CALCIUM SERPL-MCNC: 9.4 MG/DL (ref 8.3–10.4)
CHLORIDE SERPL-SCNC: 104 MMOL/L (ref 98–107)
CO2 SERPL-SCNC: 24 MMOL/L (ref 21–32)
CREAT SERPL-MCNC: 1.08 MG/DL (ref 0.8–1.5)
CRP SERPL-MCNC: 2.6 MG/DL (ref 0–0.9)
DIFFERENTIAL METHOD BLD: ABNORMAL
EOSINOPHIL # BLD: 0.4 K/UL (ref 0–0.8)
EOSINOPHIL NFR BLD: 5 % (ref 0.5–7.8)
ERYTHROCYTE [DISTWIDTH] IN BLOOD BY AUTOMATED COUNT: 12.6 % (ref 11.9–14.6)
GLOBULIN SER CALC-MCNC: 4.5 G/DL (ref 2.3–3.5)
GLUCOSE SERPL-MCNC: 96 MG/DL (ref 65–100)
HCT VFR BLD AUTO: 46.6 % (ref 41.1–50.3)
HGB BLD-MCNC: 15.1 G/DL (ref 13.6–17.2)
IMM GRANULOCYTES # BLD AUTO: 0 K/UL (ref 0–0.5)
IMM GRANULOCYTES NFR BLD AUTO: 0 % (ref 0–5)
LIPASE SERPL-CCNC: 108 U/L (ref 73–393)
LYMPHOCYTES # BLD: 0.9 K/UL (ref 0.5–4.6)
LYMPHOCYTES NFR BLD: 13 % (ref 13–44)
MCH RBC QN AUTO: 28.3 PG (ref 26.1–32.9)
MCHC RBC AUTO-ENTMCNC: 32.4 G/DL (ref 31.4–35)
MCV RBC AUTO: 87.3 FL (ref 79.6–97.8)
MONOCYTES # BLD: 1 K/UL (ref 0.1–1.3)
MONOCYTES NFR BLD: 14 % (ref 4–12)
NEUTS SEG # BLD: 4.6 K/UL (ref 1.7–8.2)
NEUTS SEG NFR BLD: 67 % (ref 43–78)
NRBC # BLD: 0 K/UL (ref 0–0.2)
PLATELET # BLD AUTO: 284 K/UL (ref 150–450)
PMV BLD AUTO: 9.5 FL (ref 9.4–12.3)
POTASSIUM SERPL-SCNC: 4.1 MMOL/L (ref 3.5–5.1)
PROT SERPL-MCNC: 8.3 G/DL (ref 6.3–8.2)
RBC # BLD AUTO: 5.34 M/UL (ref 4.23–5.6)
SODIUM SERPL-SCNC: 135 MMOL/L (ref 136–145)
WBC # BLD AUTO: 6.9 K/UL (ref 4.3–11.1)

## 2020-02-02 PROCEDURE — 99284 EMERGENCY DEPT VISIT MOD MDM: CPT

## 2020-02-02 PROCEDURE — 83690 ASSAY OF LIPASE: CPT

## 2020-02-02 PROCEDURE — 86140 C-REACTIVE PROTEIN: CPT

## 2020-02-02 PROCEDURE — 74018 RADEX ABDOMEN 1 VIEW: CPT

## 2020-02-02 PROCEDURE — 71046 X-RAY EXAM CHEST 2 VIEWS: CPT

## 2020-02-02 PROCEDURE — 85025 COMPLETE CBC W/AUTO DIFF WBC: CPT

## 2020-02-02 PROCEDURE — 80053 COMPREHEN METABOLIC PANEL: CPT

## 2020-02-02 PROCEDURE — 81003 URINALYSIS AUTO W/O SCOPE: CPT

## 2020-02-02 NOTE — ED PROVIDER NOTES
Pt arrives via EMS from [facility] with c/o abd pain in RUQ and LUQ since last night. Was able to eat a banana and OJ this morning without vomiting. Last BM yesterday. No n/v/d. VSS with elevation in / 110. Pt states the pain is like a cramping. Pt has nasal congestion and cough as well. Pt denies ETOH use. Pt states now that he has had nausea. The history is provided by the patient. Abdominal Pain    This is a new problem. The current episode started 12 to 24 hours ago. The problem occurs rarely. The problem has been resolved. The pain is associated with an unknown factor. The pain is located in the RUQ and LUQ. The quality of the pain is aching and cramping. The pain is at a severity of 8/10. The pain is severe. Pertinent negatives include no anorexia, no fever, no belching, no diarrhea, no flatus, no hematochezia, no nausea, no vomiting, no constipation, no dysuria, no hematuria, no headaches, no arthralgias, no myalgias, no trauma, no chest pain and no back pain. Nothing worsens the pain. The pain is relieved by nothing. The patient's surgical history non-contributory.        Past Medical History:   Diagnosis Date    Altered gait     Arthritis     CAD (coronary artery disease)     pt denies; ekg dated 7-21-14 states \"normal ekg\"    GERD (gastroesophageal reflux disease)     occ    History of illicit drug use     Hypertension     pt denies     Kidney stone     Mitral valve regurgitation 06/08/2011    1+    PUD (peptic ulcer disease)     1985    Smoker     approx one pack daily - 35 years        Past Surgical History:   Procedure Laterality Date    HX COLONOSCOPY      HX HIP REPLACEMENT Right 2009    Dr Serena Dhillon Left 2007    HX ORTHOPAEDIC  1998    cervical spine fusion, dr Ivan Loredo          Family History:   Problem Relation Age of Onset    Cancer Father        Social History     Socioeconomic History    Marital status: SINGLE     Spouse name: Not on file    Number of children: Not on file    Years of education: Not on file    Highest education level: Not on file   Occupational History    Not on file   Social Needs    Financial resource strain: Not on file    Food insecurity:     Worry: Not on file     Inability: Not on file    Transportation needs:     Medical: Not on file     Non-medical: Not on file   Tobacco Use    Smoking status: Current Every Day Smoker     Packs/day: 1.00     Years: 35.00     Pack years: 35.00    Smokeless tobacco: Never Used   Substance and Sexual Activity    Alcohol use: Yes     Comment: social    Drug use: Yes     Types: Marijuana, Cocaine    Sexual activity: Not on file   Lifestyle    Physical activity:     Days per week: Not on file     Minutes per session: Not on file    Stress: Not on file   Relationships    Social connections:     Talks on phone: Not on file     Gets together: Not on file     Attends Latter-day service: Not on file     Active member of club or organization: Not on file     Attends meetings of clubs or organizations: Not on file     Relationship status: Not on file    Intimate partner violence:     Fear of current or ex partner: Not on file     Emotionally abused: Not on file     Physically abused: Not on file     Forced sexual activity: Not on file   Other Topics Concern    Not on file   Social History Narrative    Not on file         ALLERGIES: Patient has no known allergies. Review of Systems   Constitutional: Negative for fever. Cardiovascular: Negative for chest pain. Gastrointestinal: Positive for abdominal pain. Negative for anorexia, constipation, diarrhea, flatus, hematochezia, nausea and vomiting. Genitourinary: Negative for dysuria and hematuria. Musculoskeletal: Negative for arthralgias, back pain and myalgias. Neurological: Negative for headaches. All other systems reviewed and are negative.       Vitals:    02/02/20 1338   BP: 129/85   Pulse: (!) 104   Resp: 16   Temp: 99.4 °F (37.4 °C) SpO2: 96%   Weight: 72.6 kg (160 lb)   Height: 6' (1.829 m)            Physical Exam  Vitals signs and nursing note reviewed. Constitutional:       Appearance: Normal appearance. HENT:      Head: Normocephalic and atraumatic. Nose: Nose normal.      Mouth/Throat:      Mouth: Mucous membranes are moist.   Eyes:      Extraocular Movements: Extraocular movements intact. Conjunctiva/sclera: Conjunctivae normal.      Pupils: Pupils are equal, round, and reactive to light. Neck:      Musculoskeletal: Normal range of motion and neck supple. Cardiovascular:      Rate and Rhythm: Normal rate and regular rhythm. Pulses: Normal pulses. Heart sounds: Normal heart sounds. Pulmonary:      Effort: Pulmonary effort is normal.      Breath sounds: Normal breath sounds. Abdominal:      General: Abdomen is flat. Bowel sounds are normal. There is no distension. Palpations: Abdomen is soft. There is no mass. Tenderness: There is abdominal tenderness. There is no right CVA tenderness, left CVA tenderness, guarding or rebound. Hernia: No hernia is present. Musculoskeletal: Normal range of motion. Skin:     General: Skin is warm and dry. Capillary Refill: Capillary refill takes less than 2 seconds. Neurological:      General: No focal deficit present. Mental Status: He is alert and oriented to person, place, and time. Mental status is at baseline.    Psychiatric:         Mood and Affect: Mood normal.         Behavior: Behavior normal.          MDM  Number of Diagnoses or Management Options     Amount and/or Complexity of Data Reviewed  Clinical lab tests: ordered and reviewed  Tests in the radiology section of CPT®: ordered and reviewed  Review and summarize past medical records: yes  Independent visualization of images, tracings, or specimens: yes    Risk of Complications, Morbidity, and/or Mortality  Presenting problems: moderate  Diagnostic procedures: moderate  Management options: moderate    Patient Progress  Patient progress: stable         Procedures

## 2020-02-02 NOTE — DISCHARGE INSTRUCTIONS
Patient Education        Abdominal Pain: Care Instructions  Your Care Instructions    Abdominal pain has many possible causes. Some aren't serious and get better on their own in a few days. Others need more testing and treatment. If your pain continues or gets worse, you need to be rechecked and may need more tests to find out what is wrong. You may need surgery to correct the problem. Don't ignore new symptoms, such as fever, nausea and vomiting, urination problems, pain that gets worse, and dizziness. These may be signs of a more serious problem. Your doctor may have recommended a follow-up visit in the next 8 to 12 hours. If you are not getting better, you may need more tests or treatment. The doctor has checked you carefully, but problems can develop later. If you notice any problems or new symptoms, get medical treatment right away. Follow-up care is a key part of your treatment and safety. Be sure to make and go to all appointments, and call your doctor if you are having problems. It's also a good idea to know your test results and keep a list of the medicines you take. How can you care for yourself at home? · Rest until you feel better. · To prevent dehydration, drink plenty of fluids, enough so that your urine is light yellow or clear like water. Choose water and other caffeine-free clear liquids until you feel better. If you have kidney, heart, or liver disease and have to limit fluids, talk with your doctor before you increase the amount of fluids you drink. · If your stomach is upset, eat mild foods, such as rice, dry toast or crackers, bananas, and applesauce. Try eating several small meals instead of two or three large ones. · Wait until 48 hours after all symptoms have gone away before you have spicy foods, alcohol, and drinks that contain caffeine. · Do not eat foods that are high in fat. · Avoid anti-inflammatory medicines such as aspirin, ibuprofen (Advil, Motrin), and naproxen (Aleve). These can cause stomach upset. Talk to your doctor if you take daily aspirin for another health problem. When should you call for help? Call 911 anytime you think you may need emergency care. For example, call if:    · You passed out (lost consciousness).     · You pass maroon or very bloody stools.     · You vomit blood or what looks like coffee grounds.     · You have new, severe belly pain.    Call your doctor now or seek immediate medical care if:    · Your pain gets worse, especially if it becomes focused in one area of your belly.     · You have a new or higher fever.     · Your stools are black and look like tar, or they have streaks of blood.     · You have unexpected vaginal bleeding.     · You have symptoms of a urinary tract infection. These may include:  ? Pain when you urinate. ? Urinating more often than usual.  ? Blood in your urine.     · You are dizzy or lightheaded, or you feel like you may faint.    Watch closely for changes in your health, and be sure to contact your doctor if:    · You are not getting better after 1 day (24 hours). Where can you learn more? Go to http://jose-jason.info/. Enter T581 in the search box to learn more about \"Abdominal Pain: Care Instructions. \"  Current as of: June 26, 2019  Content Version: 12.2  © 8799-7163 [employer]. Care instructions adapted under license by [employer] (which disclaims liability or warranty for this information). If you have questions about a medical condition or this instruction, always ask your healthcare professional. [place] [address] any warranty or liability for your use of this information. Patient Education        Constipation: Care Instructions  Your Care Instructions    Constipation means that you have a hard time passing stools (bowel movements). People pass stools from 3 times a day to once every 3 days. What is normal for you may be different.  Constipation may occur with pain in the rectum and cramping. The pain may get worse when you try to pass stools. Sometimes there are small amounts of bright red blood on toilet paper or the surface of stools. This is because of enlarged veins near the rectum (hemorrhoids). A few changes in your diet and lifestyle may help you avoid ongoing constipation. Your doctor may also prescribe medicine to help loosen your stool. Some medicines can cause constipation. These include pain medicines and antidepressants. Tell your doctor about all the medicines you take. Your doctor may want to make a medicine change to ease your symptoms. Follow-up care is a key part of your treatment and safety. Be sure to make and go to all appointments, and call your doctor if you are having problems. It's also a good idea to know your test results and keep a list of the medicines you take. How can you care for yourself at home? · Drink plenty of fluids, enough so that your urine is light yellow or clear like water. If you have kidney, heart, or liver disease and have to limit fluids, talk with your doctor before you increase the amount of fluids you drink. · Include high-fiber foods in your diet each day. These include fruits, vegetables, beans, and whole grains. · Get at least 30 minutes of exercise on most days of the week. Walking is a good choice. You also may want to do other activities, such as running, swimming, cycling, or playing tennis or team sports. · Take a fiber supplement, such as Citrucel or Metamucil, every day. Read and follow all instructions on the label. · Schedule time each day for a bowel movement. A daily routine may help. Take your time having your bowel movement. · Support your feet with a small step stool when you sit on the toilet. This helps flex your hips and places your pelvis in a squatting position. · Your doctor may recommend an over-the-counter laxative to relieve your constipation.  Examples are Milk of Magnesia and MiraLax. Read and follow all instructions on the label. Do not use laxatives on a long-term basis. When should you call for help? Call your doctor now or seek immediate medical care if:    · You have new or worse belly pain.     · You have new or worse nausea or vomiting.     · You have blood in your stools.    Watch closely for changes in your health, and be sure to contact your doctor if:    · Your constipation is getting worse.     · You do not get better as expected. Where can you learn more? Go to http://jose-jason.info/. Enter 21  in the search box to learn more about \"Constipation: Care Instructions. \"  Current as of: June 26, 2019  Content Version: 12.2  © 2326-1660 [employer]. Care instructions adapted under license by [employer] (which disclaims liability or warranty for this information). If you have questions about a medical condition or this instruction, always ask your healthcare professional. [place] [address] any warranty or liability for your use of this information.

## 2020-02-02 NOTE — ED NOTES
I have reviewed discharge instructions with the patient. The patient verbalized understanding. Patient left ED via Discharge Method: wheelchair to Home with self. Opportunity for questions and clarification provided. Patient given 0 scripts. To continue your aftercare when you leave the hospital, you may receive an automated call from our care team to check in on how you are doing. This is a free service and part of our promise to provide the best care and service to meet your aftercare needs.  If you have questions, or wish to unsubscribe from this service please call [phone]. Thank you for Choosing our [facility] Emergency Department.

## 2020-02-02 NOTE — ED TRIAGE NOTES
Pt arrives via EMS from [facility] with c/o abd pain in RUQ and LUQ since last night. Was able to eat a banana and OJ this morning without vomiting. Last BM yesterday. No n/v/d. VSS with elevation in / 110. Pt states the pain is like a cramping. Pt has nasal congestion and cough as well. Pt denies ETOH use. Pt states now that has had nausea.

## 2020-05-26 ENCOUNTER — HOSPITAL ENCOUNTER (OUTPATIENT)
Dept: LAB | Age: 65
Discharge: HOME | End: 2020-05-26

## 2020-05-26 PROCEDURE — 88312 SPECIAL STAINS GROUP 1: CPT

## 2020-05-26 PROCEDURE — 88305 TISSUE EXAM BY PATHOLOGIST: CPT

## 2020-09-16 PROBLEM — D12.5 ADENOMATOUS POLYP OF SIGMOID COLON: Chronic | Status: ACTIVE | Noted: 2020-09-16

## 2020-09-16 PROBLEM — K63.5 HYPERPLASTIC POLYP OF SIGMOID COLON: Chronic | Status: ACTIVE | Noted: 2020-09-16

## 2020-09-16 PROBLEM — K63.5 HYPERPLASTIC POLYP OF SIGMOID COLON: Status: ACTIVE | Noted: 2020-09-16

## 2020-09-16 PROBLEM — D12.5 ADENOMATOUS POLYP OF SIGMOID COLON: Status: ACTIVE | Noted: 2020-09-16

## 2020-11-30 ENCOUNTER — ANESTHESIA EVENT (OUTPATIENT)
Dept: ENDOSCOPY | Age: 65
End: 2020-11-30
Payer: MEDICARE

## 2020-11-30 RX ORDER — SODIUM CHLORIDE 0.9 % (FLUSH) 0.9 %
5-40 SYRINGE (ML) INJECTION AS NEEDED
Status: CANCELLED | OUTPATIENT
Start: 2020-11-30

## 2020-11-30 RX ORDER — SODIUM CHLORIDE 0.9 % (FLUSH) 0.9 %
5-40 SYRINGE (ML) INJECTION EVERY 8 HOURS
Status: CANCELLED | OUTPATIENT
Start: 2020-11-30

## 2020-11-30 RX ORDER — SODIUM CHLORIDE, SODIUM LACTATE, POTASSIUM CHLORIDE, CALCIUM CHLORIDE 600; 310; 30; 20 MG/100ML; MG/100ML; MG/100ML; MG/100ML
100 INJECTION, SOLUTION INTRAVENOUS CONTINUOUS
Status: CANCELLED | OUTPATIENT
Start: 2020-11-30

## 2020-11-30 NOTE — PROGRESS NOTES
Confirmed appointment and arrival time with pt. Pt educated to have responsible party with him to drive him home post procedure.

## 2020-12-01 ENCOUNTER — ANESTHESIA (OUTPATIENT)
Dept: ENDOSCOPY | Age: 65
End: 2020-12-01
Payer: MEDICARE

## 2020-12-01 ENCOUNTER — HOSPITAL ENCOUNTER (OUTPATIENT)
Age: 65
Setting detail: OUTPATIENT SURGERY
Discharge: HOME | End: 2020-12-01
Attending: INTERNAL MEDICINE | Admitting: INTERNAL MEDICINE
Payer: MEDICARE

## 2020-12-01 VITALS
RESPIRATION RATE: 16 BRPM | DIASTOLIC BLOOD PRESSURE: 88 MMHG | HEIGHT: 72 IN | OXYGEN SATURATION: 100 % | BODY MASS INDEX: 24.38 KG/M2 | HEART RATE: 68 BPM | SYSTOLIC BLOOD PRESSURE: 176 MMHG | TEMPERATURE: 98 F | WEIGHT: 180 LBS

## 2020-12-01 PROCEDURE — 74011000250 HC RX REV CODE- 250: Performed by: ANESTHESIOLOGY

## 2020-12-01 PROCEDURE — 76040000025: Performed by: INTERNAL MEDICINE

## 2020-12-01 PROCEDURE — 74011000250 HC RX REV CODE- 250: Performed by: NURSE ANESTHETIST, CERTIFIED REGISTERED

## 2020-12-01 PROCEDURE — 74011250636 HC RX REV CODE- 250/636: Performed by: ANESTHESIOLOGY

## 2020-12-01 PROCEDURE — 77030008969: Performed by: INTERNAL MEDICINE

## 2020-12-01 PROCEDURE — 74011250636 HC RX REV CODE- 250/636: Performed by: NURSE ANESTHETIST, CERTIFIED REGISTERED

## 2020-12-01 PROCEDURE — 2709999900 HC NON-CHARGEABLE SUPPLY: Performed by: INTERNAL MEDICINE

## 2020-12-01 PROCEDURE — 76060000031 HC ANESTHESIA FIRST 0.5 HR: Performed by: INTERNAL MEDICINE

## 2020-12-01 RX ORDER — LIDOCAINE HYDROCHLORIDE 20 MG/ML
INJECTION, SOLUTION EPIDURAL; INFILTRATION; INTRACAUDAL; PERINEURAL AS NEEDED
Status: DISCONTINUED | OUTPATIENT
Start: 2020-12-01 | End: 2020-12-01 | Stop reason: HOSPADM

## 2020-12-01 RX ORDER — SODIUM CHLORIDE 9 MG/ML
10 INJECTION, SOLUTION INTRAVENOUS CONTINUOUS
Status: DISCONTINUED | OUTPATIENT
Start: 2020-12-01 | End: 2020-12-01 | Stop reason: HOSPADM

## 2020-12-01 RX ORDER — PROPOFOL 10 MG/ML
INJECTION, EMULSION INTRAVENOUS
Status: DISCONTINUED | OUTPATIENT
Start: 2020-12-01 | End: 2020-12-01 | Stop reason: HOSPADM

## 2020-12-01 RX ORDER — PROPOFOL 10 MG/ML
INJECTION, EMULSION INTRAVENOUS AS NEEDED
Status: DISCONTINUED | OUTPATIENT
Start: 2020-12-01 | End: 2020-12-01 | Stop reason: HOSPADM

## 2020-12-01 RX ORDER — SODIUM CHLORIDE, SODIUM LACTATE, POTASSIUM CHLORIDE, CALCIUM CHLORIDE 600; 310; 30; 20 MG/100ML; MG/100ML; MG/100ML; MG/100ML
100 INJECTION, SOLUTION INTRAVENOUS CONTINUOUS
Status: DISCONTINUED | OUTPATIENT
Start: 2020-12-01 | End: 2020-12-01 | Stop reason: HOSPADM

## 2020-12-01 RX ADMIN — SODIUM CHLORIDE, SODIUM LACTATE, POTASSIUM CHLORIDE, AND CALCIUM CHLORIDE 100 ML/HR: 600; 310; 30; 20 INJECTION, SOLUTION INTRAVENOUS at 13:50

## 2020-12-01 RX ADMIN — PROPOFOL 180 MCG/KG/MIN: 10 INJECTION, EMULSION INTRAVENOUS at 14:09

## 2020-12-01 RX ADMIN — FAMOTIDINE 20 MG: 10 INJECTION INTRAVENOUS at 13:52

## 2020-12-01 RX ADMIN — PROPOFOL 60 MG: 10 INJECTION, EMULSION INTRAVENOUS at 14:11

## 2020-12-01 RX ADMIN — LIDOCAINE HYDROCHLORIDE 50 MG: 20 INJECTION, SOLUTION EPIDURAL; INFILTRATION; INTRACAUDAL; PERINEURAL at 14:09

## 2020-12-01 RX ADMIN — PROPOFOL 40 MG: 10 INJECTION, EMULSION INTRAVENOUS at 14:09

## 2020-12-01 NOTE — H&P
Gastroenterology Outpatient History and Physical    Patient: Chino Londono. Physician: Maxwell Gifford MD    Chief Complaint: Gastric mass    History of Present Illness: Abnomal EGD    Justification for Procedure: As above    History:  Past Medical History:   Diagnosis Date    Altered gait     Arthritis     CAD (coronary artery disease)     pt denies    GERD (gastroesophageal reflux disease)     occ PRN medications    History of illicit drug use     patient denies current drug use  11/24/20    Hypertension     pt denies     Kidney stone     Mitral valve regurgitation 06/08/2011    1+    PUD (peptic ulcer disease)     1985    Smoker     approx one pack daily - 35 years       Past Surgical History:   Procedure Laterality Date    HX COLONOSCOPY      HX HIP REPLACEMENT Right 2009    Dr Amy Harden    HX HIP REPLACEMENT Left 2007    HX ORTHOPAEDIC  1998    cervical spine fusion, dr Evan Shafer      kidney stone removal - states doesn't know if was litho or cysto      Social History     Socioeconomic History    Marital status: SINGLE     Spouse name: Not on file    Number of children: Not on file    Years of education: Not on file    Highest education level: Not on file   Tobacco Use    Smoking status: Current Every Day Smoker     Packs/day: 1.00     Years: 35.00     Pack years: 35.00    Smokeless tobacco: Never Used   Substance and Sexual Activity    Alcohol use: Yes     Comment: social    Drug use: Yes     Types: Marijuana, Cocaine     Comment: patient states not using drugs 11/24/20      Family History   Problem Relation Age of Onset    Cancer Father        Allergies: No Known Allergies    Medications:   Prior to Admission medications    Medication Sig Start Date End Date Taking? Authorizing Provider   omeprazole (PRILOSEC) 40 mg capsule Take 1 Cap by mouth daily. Indications: gastroesophageal reflux disease  Patient taking differently: Take 40 mg by mouth daily. Indications: gastroesophageal reflux disease, prn 8/26/19   Ant Fink NP       Vital Signs: There were no vitals taken for this visit.     Physical Exam:   General: alert      Heart: regular rate and rhythm   Lungs: no tachypnea, retractions or cyanosis, Heart exam - S1, S2 normal, no murmur, no gallop, rate regular   Abdominal: Bowel sounds are normal, soft, non distended             Plan of Care/Planned Procedure: EUS    Signed:  Lula Ogden MD 12/1/2020

## 2020-12-01 NOTE — ROUTINE PROCESS
VSS at discharge. No complaints noted. Education reviewed and signed with patient and nephew. Pt discharged via wheelchair by [facility]. Patient to be driven home by his nephew. Patient's walker discharged with patient

## 2020-12-01 NOTE — DISCHARGE INSTRUCTIONS
Gastrointestinal Esophagogastroduodenoscopy (EGD) - Upper Exam Discharge Instructions    1. Call Dr. Mars Thomas at [phone] for any problems or questions. 2. Contact the doctor's office for follow up appointment as directed. 3. Medication may cause drowsiness for several hours, therefore, do not drive or operate machinery for remainder of the day. Do not make any legal decisions today. 4. No alcohol today. 5. Ordinarily, you may resume regular diet and activity after exam unless otherwise specified by your physician. 6. For mild soreness in your throat you may use Cepacol throat lozenges or warm salt-water gargles as needed. Any additional instructions:   1. Follow-up in office with Dr. Mary Daniel in 6 months  2.  Repeat EUS in one year

## 2020-12-01 NOTE — ANESTHESIA POSTPROCEDURE EVALUATION
Procedure(s):  ENDOSCOPIC ULTRASOUND (EUS)/ 25.    total IV anesthesia    Anesthesia Post Evaluation      Multimodal analgesia: multimodal analgesia used between 6 hours prior to anesthesia start to PACU discharge  Patient location during evaluation: bedside  Patient participation: complete - patient participated  Level of consciousness: awake  Pain management: adequate  Airway patency: patent  Anesthetic complications: no  Cardiovascular status: acceptable and stable  Respiratory status: acceptable and room air  Hydration status: acceptable  Post anesthesia nausea and vomiting:  none  Final Post Anesthesia Temperature Assessment:  Normothermia (36.0-37.5 degrees C)      INITIAL Post-op Vital signs:   Vitals Value Taken Time   /86 12/1/2020  2:24 PM   Temp 36.7 °C (98 °F) 12/1/2020  2:24 PM   Pulse 92 12/1/2020  2:26 PM   Resp 14 12/1/2020  2:24 PM   SpO2 94 % 12/1/2020  2:26 PM   Vitals shown include unvalidated device data.

## 2020-12-01 NOTE — ANESTHESIA PREPROCEDURE EVALUATION
Relevant Problems   No relevant active problems       Anesthetic History   No history of anesthetic complications            Review of Systems / Medical History  Patient summary reviewed and pertinent labs reviewed    Pulmonary          Smoker         Neuro/Psych   Within defined limits           Cardiovascular    Hypertension            Pertinent negatives: CAD: denies.   Exercise tolerance: >4 METS     GI/Hepatic/Renal     GERD    Renal disease: stones  PUD     Endo/Other        Arthritis     Other Findings   Comments: Hx cervical fusion 2 level                Anesthetic Plan    ASA: 3  Anesthesia type: total IV anesthesia          Induction: Intravenous  Anesthetic plan and risks discussed with: Patient and Family

## 2020-12-16 ENCOUNTER — HOSPITAL ENCOUNTER (OUTPATIENT)
Dept: GENERAL RADIOLOGY | Age: 65
Discharge: HOME | End: 2020-12-16

## 2020-12-16 DIAGNOSIS — M25.551 CHRONIC HIP PAIN, RIGHT: ICD-10-CM

## 2020-12-16 DIAGNOSIS — G89.29 CHRONIC HIP PAIN, RIGHT: ICD-10-CM

## 2022-03-18 PROBLEM — R50.82 POSTOPERATIVE FEVER: Status: ACTIVE | Noted: 2017-07-26

## 2022-03-19 PROBLEM — R50.9 FEVER: Status: ACTIVE | Noted: 2017-07-26

## 2022-03-19 PROBLEM — K63.5 HYPERPLASTIC POLYP OF SIGMOID COLON: Status: ACTIVE | Noted: 2020-09-16

## 2022-03-19 PROBLEM — Z87.891 PERSONAL HISTORY OF NICOTINE DEPENDENCE: Status: ACTIVE | Noted: 2017-05-19

## 2022-03-19 PROBLEM — G82.20 PARAPARESIS OF BOTH LOWER LIMBS (HCC): Status: ACTIVE | Noted: 2017-06-01

## 2022-03-19 PROBLEM — N21.0 BLADDER STONES: Status: ACTIVE | Noted: 2017-06-01

## 2022-03-19 PROBLEM — D12.5 ADENOMATOUS POLYP OF SIGMOID COLON: Status: ACTIVE | Noted: 2020-09-16

## 2022-03-19 PROBLEM — N40.0 BPH (BENIGN PROSTATIC HYPERPLASIA): Status: ACTIVE | Noted: 2017-07-24

## 2022-03-20 PROBLEM — R77.1 HYPERGLOBULINEMIA: Status: ACTIVE | Noted: 2017-05-19

## 2022-03-20 PROBLEM — N21.0 BLADDER CALCULI: Status: ACTIVE | Noted: 2017-07-24

## 2023-07-13 ENCOUNTER — OFFICE VISIT (OUTPATIENT)
Dept: INTERNAL MEDICINE CLINIC | Facility: CLINIC | Age: 68
End: 2023-07-13
Payer: MEDICARE

## 2023-07-13 VITALS
WEIGHT: 166 LBS | HEIGHT: 72 IN | BODY MASS INDEX: 22.48 KG/M2 | OXYGEN SATURATION: 97 % | TEMPERATURE: 98.2 F | DIASTOLIC BLOOD PRESSURE: 81 MMHG | SYSTOLIC BLOOD PRESSURE: 140 MMHG | HEART RATE: 73 BPM

## 2023-07-13 DIAGNOSIS — Z96.641 HX OF TOTAL HIP ARTHROPLASTY, RIGHT: ICD-10-CM

## 2023-07-13 DIAGNOSIS — R97.20 ELEVATED PSA: ICD-10-CM

## 2023-07-13 DIAGNOSIS — Z12.5 ENCOUNTER FOR SCREENING FOR MALIGNANT NEOPLASM OF PROSTATE: ICD-10-CM

## 2023-07-13 DIAGNOSIS — Z98.1 HX OF FUSION OF CERVICAL SPINE: ICD-10-CM

## 2023-07-13 DIAGNOSIS — E55.9 VITAMIN D DEFICIENCY: ICD-10-CM

## 2023-07-13 DIAGNOSIS — R53.83 FATIGUE, UNSPECIFIED TYPE: ICD-10-CM

## 2023-07-13 DIAGNOSIS — Z59.82 TRANSPORTATION INSECURITY: ICD-10-CM

## 2023-07-13 DIAGNOSIS — I10 PRIMARY HYPERTENSION: ICD-10-CM

## 2023-07-13 DIAGNOSIS — Z76.89 ESTABLISHING CARE WITH NEW DOCTOR, ENCOUNTER FOR: Primary | ICD-10-CM

## 2023-07-13 DIAGNOSIS — Z87.442 HISTORY OF NEPHROLITHIASIS: ICD-10-CM

## 2023-07-13 DIAGNOSIS — Z59.41 FOOD INSECURITY: ICD-10-CM

## 2023-07-13 PROCEDURE — G8427 DOCREV CUR MEDS BY ELIG CLIN: HCPCS | Performed by: INTERNAL MEDICINE

## 2023-07-13 PROCEDURE — 1123F ACP DISCUSS/DSCN MKR DOCD: CPT | Performed by: INTERNAL MEDICINE

## 2023-07-13 PROCEDURE — 99214 OFFICE O/P EST MOD 30 MIN: CPT | Performed by: INTERNAL MEDICINE

## 2023-07-13 PROCEDURE — G8420 CALC BMI NORM PARAMETERS: HCPCS | Performed by: INTERNAL MEDICINE

## 2023-07-13 PROCEDURE — 3077F SYST BP >= 140 MM HG: CPT | Performed by: INTERNAL MEDICINE

## 2023-07-13 PROCEDURE — 4004F PT TOBACCO SCREEN RCVD TLK: CPT | Performed by: INTERNAL MEDICINE

## 2023-07-13 PROCEDURE — 3079F DIAST BP 80-89 MM HG: CPT | Performed by: INTERNAL MEDICINE

## 2023-07-13 PROCEDURE — 3017F COLORECTAL CA SCREEN DOC REV: CPT | Performed by: INTERNAL MEDICINE

## 2023-07-13 RX ORDER — LISINOPRIL 5 MG/1
5 TABLET ORAL DAILY
Qty: 90 TABLET | Refills: 1 | Status: SHIPPED | OUTPATIENT
Start: 2023-07-13

## 2023-07-13 SDOH — ECONOMIC STABILITY - TRANSPORTATION SECURITY: TRANSPORTATION INSECURITY: Z59.82

## 2023-07-13 SDOH — ECONOMIC STABILITY: INCOME INSECURITY: HOW HARD IS IT FOR YOU TO PAY FOR THE VERY BASICS LIKE FOOD, HOUSING, MEDICAL CARE, AND HEATING?: HARD

## 2023-07-13 SDOH — ECONOMIC STABILITY: FOOD INSECURITY: WITHIN THE PAST 12 MONTHS, YOU WORRIED THAT YOUR FOOD WOULD RUN OUT BEFORE YOU GOT MONEY TO BUY MORE.: NEVER TRUE

## 2023-07-13 SDOH — ECONOMIC STABILITY - FOOD INSECURITY: FOOD INSECURITY: Z59.41

## 2023-07-13 SDOH — ECONOMIC STABILITY: FOOD INSECURITY: WITHIN THE PAST 12 MONTHS, THE FOOD YOU BOUGHT JUST DIDN'T LAST AND YOU DIDN'T HAVE MONEY TO GET MORE.: SOMETIMES TRUE

## 2023-07-13 SDOH — ECONOMIC STABILITY: HOUSING INSECURITY
IN THE LAST 12 MONTHS, WAS THERE A TIME WHEN YOU DID NOT HAVE A STEADY PLACE TO SLEEP OR SLEPT IN A SHELTER (INCLUDING NOW)?: NO

## 2023-07-13 ASSESSMENT — PATIENT HEALTH QUESTIONNAIRE - PHQ9
SUM OF ALL RESPONSES TO PHQ QUESTIONS 1-9: 0
SUM OF ALL RESPONSES TO PHQ9 QUESTIONS 1 & 2: 0
2. FEELING DOWN, DEPRESSED OR HOPELESS: 0
1. LITTLE INTEREST OR PLEASURE IN DOING THINGS: 0
SUM OF ALL RESPONSES TO PHQ QUESTIONS 1-9: 0

## 2023-07-13 NOTE — ACP (ADVANCE CARE PLANNING)
Advance Care Planning   The patient has the following advanced directives on file:  Advance Directives       Power of [address] Will ACP-Advance Directive ACP-Power of     Not on File Not on File Not on File Not on File            The patient has appointed the following active healthcare agents:    Primary Decision Maker: Haim Vidal - [phone]    The Patient has the following current code status:    Code Status: Not on file    Visit Documentation:  I discussed [address] [place] [address] with Mitchell Dalton today which included the importance of making their choices for care and treatment in the case of a health event that adversely affects their decision-making abilities. He has not completed the Advance Care Directives. He does not have an active health care agent at this time. iMtchell Dalton was encouraged to complete the declaration forms and provide a signed copy of his medical records.        Praful Iyer  7/13/2023

## 2023-07-13 NOTE — PROGRESS NOTES
Junaid Escudero. (: 1955) is a 77 y.o. male, here for evaluation of the following chief complaint(s):  Established New Doctor (Pt is here to estab PCP care. Pt is transferring from [facility]. Pt has no complaints today.)       ASSESSMENT/PLAN:  1. Establishing care with new doctor, encounter for  2. Primary hypertension  -     lisinopril (PRINIVIL;ZESTRIL) 5 MG tablet; Take 1 tablet by mouth daily, Disp-90 tablet, R-1Normal  -     Comprehensive Metabolic Panel; Future  -     Lipid Panel; Future  3. Hx of total hip arthroplasty, right  Comments:  Done in [place] with abnormal right foot external rotation. He has been bakc to Ortho, but had not clear address of gait disturbance. Using walker. Orders:  -     [facility]  4. History of nephrolithiasis  Comments:  procedure perfomred because of stones to recude size of prostate. Impaired erectile function   5. Hx of fusion of cervical spine  6. Food insecurity  7. Elevated PSA  -     PSA Screening; Future  8. Fatigue, unspecified type  -     CBC with Auto Differential; Future  -     TSH with Reflex; Future  9. Vitamin D deficiency  -     Vitamin D 25 Hydroxy; Future  10. Encounter for screening for malignant neoplasm of prostate  -     PSA Screening; Future  11. Transportation insecurity             SUBJECTIVE/OBJECTIVE:  HPI: Patient is a pleasant 49-year-old that presents to establish care with a new pcp. Hx of HTN, but not on medications. Elevated in office. Not checking numbers at home. Previously on Lisinopril 10. Offering 5 mg dosing at this time. Patient to start checking BP at home. Patient with concerns of leg length discrepancy as a consequence of previous right total hip. Surgery was many years ago, but his ambulation has been modified as a consequence of altered gait mechanics. Now using a wheeled walker.  Offering referral to Ortho locally for consideration of revision or

## 2023-07-14 LAB
25(OH)D3 SERPL-MCNC: 16.3 NG/ML (ref 30–100)
ALBUMIN SERPL-MCNC: 3.7 G/DL (ref 3.2–4.6)
ALBUMIN/GLOB SERPL: 1.1 (ref 0.4–1.6)
ALP SERPL-CCNC: 91 U/L (ref 50–136)
ALT SERPL-CCNC: 23 U/L (ref 12–65)
ANION GAP SERPL CALC-SCNC: 8 MMOL/L (ref 2–11)
AST SERPL-CCNC: 13 U/L (ref 15–37)
BASOPHILS # BLD: 0.1 K/UL (ref 0–0.2)
BASOPHILS NFR BLD: 1 % (ref 0–2)
BILIRUB SERPL-MCNC: 0.2 MG/DL (ref 0.2–1.1)
BUN SERPL-MCNC: 14 MG/DL (ref 8–23)
CALCIUM SERPL-MCNC: 9.3 MG/DL (ref 8.3–10.4)
CHLORIDE SERPL-SCNC: 116 MMOL/L (ref 101–110)
CHOLEST SERPL-MCNC: 158 MG/DL
CO2 SERPL-SCNC: 20 MMOL/L (ref 21–32)
CREAT SERPL-MCNC: 1 MG/DL (ref 0.8–1.5)
DIFFERENTIAL METHOD BLD: NORMAL
EOSINOPHIL # BLD: 0.2 K/UL (ref 0–0.8)
EOSINOPHIL NFR BLD: 4 % (ref 0.5–7.8)
ERYTHROCYTE [DISTWIDTH] IN BLOOD BY AUTOMATED COUNT: 13.8 % (ref 11.9–14.6)
GLOBULIN SER CALC-MCNC: 3.4 G/DL (ref 2.8–4.5)
GLUCOSE SERPL-MCNC: 93 MG/DL (ref 65–100)
HCT VFR BLD AUTO: 49.4 % (ref 41.1–50.3)
HDLC SERPL-MCNC: 55 MG/DL (ref 40–60)
HDLC SERPL: 2.9
HGB BLD-MCNC: 15.7 G/DL (ref 13.6–17.2)
IMM GRANULOCYTES # BLD AUTO: 0 K/UL (ref 0–0.5)
IMM GRANULOCYTES NFR BLD AUTO: 0 % (ref 0–5)
LDLC SERPL CALC-MCNC: 93.8 MG/DL
LYMPHOCYTES # BLD: 1.4 K/UL (ref 0.5–4.6)
LYMPHOCYTES NFR BLD: 28 % (ref 13–44)
MCH RBC QN AUTO: 29.2 PG (ref 26.1–32.9)
MCHC RBC AUTO-ENTMCNC: 31.8 G/DL (ref 31.4–35)
MCV RBC AUTO: 92 FL (ref 82–102)
MONOCYTES # BLD: 0.6 K/UL (ref 0.1–1.3)
MONOCYTES NFR BLD: 11 % (ref 4–12)
NEUTS SEG # BLD: 2.9 K/UL (ref 1.7–8.2)
NEUTS SEG NFR BLD: 56 % (ref 43–78)
NRBC # BLD: 0 K/UL (ref 0–0.2)
PLATELET # BLD AUTO: 241 K/UL (ref 150–450)
PMV BLD AUTO: 11.2 FL (ref 9.4–12.3)
POTASSIUM SERPL-SCNC: 4.2 MMOL/L (ref 3.5–5.1)
PROT SERPL-MCNC: 7.1 G/DL (ref 6.3–8.2)
PSA SERPL-MCNC: 0.9 NG/ML
RBC # BLD AUTO: 5.37 M/UL (ref 4.23–5.6)
SODIUM SERPL-SCNC: 144 MMOL/L (ref 133–143)
TRIGL SERPL-MCNC: 46 MG/DL (ref 35–150)
TSH W FREE THYROID IF ABNORMAL: 0.8 UIU/ML (ref 0.36–3.74)
VLDLC SERPL CALC-MCNC: 9.2 MG/DL (ref 6–23)
WBC # BLD AUTO: 5.2 K/UL (ref 4.3–11.1)

## 2023-07-24 ENCOUNTER — TELEPHONE (OUTPATIENT)
Dept: INTERNAL MEDICINE CLINIC | Facility: CLINIC | Age: 68
End: 2023-07-24

## 2023-07-24 NOTE — TELEPHONE ENCOUNTER
Pt called in asking if Vitamin D has been called in to his pharmacy. I let him know that he can purchase Vitamin D 5000 units OTC at any pharmacy. He voiced understanding.

## 2023-08-23 ENCOUNTER — OFFICE VISIT (OUTPATIENT)
Dept: ORTHOPEDIC SURGERY | Age: 68
End: 2023-08-23

## 2023-08-23 DIAGNOSIS — M54.50 LOW BACK PAIN, UNSPECIFIED BACK PAIN LATERALITY, UNSPECIFIED CHRONICITY, UNSPECIFIED WHETHER SCIATICA PRESENT: ICD-10-CM

## 2023-08-23 DIAGNOSIS — Z96.641 STATUS POST TOTAL HIP REPLACEMENT, RIGHT: ICD-10-CM

## 2023-08-23 DIAGNOSIS — Z96.642 STATUS POST TOTAL HIP REPLACEMENT, LEFT: ICD-10-CM

## 2023-08-23 DIAGNOSIS — M25.551 RIGHT HIP PAIN: Primary | ICD-10-CM

## 2023-08-23 DIAGNOSIS — M47.816 LUMBAR SPONDYLOSIS: ICD-10-CM

## 2023-08-23 PROCEDURE — 3017F COLORECTAL CA SCREEN DOC REV: CPT | Performed by: ORTHOPAEDIC SURGERY

## 2023-08-23 NOTE — PROGRESS NOTES
Name: Yolanda Joshi. YOB: 1955  Gender: male  MRN: [id number]    CC:   Chief Complaint   Patient presents with    Hip Pain     Right CAROLYN pain-getting xrays today  Rt CAROLYN in past 3/10/2009 and will get L-Spine xray today          HPI:   The pain has been present for over a year and is becoming worse. It hurts at night when sleeping. There was not an acute injury to the hip. The pain is located over the right side low back and buttock region. It hurts to walk and pain worsens with increased distance. The pain does not radiate down the leg. Numbness and tingling are not noted. The patient is not having difficulty putting socks and shoes on. Treatment so far has been anti-inflammatory medications. History of extremity paresis and altered gait after cervical spine surgery in 97. Review of Systems  As per HPI. Pertinent positives and negatives are addressed with the patient, particularly those related to musculoskeletal concerns. Non-orthopaedic concerns were referred back to the primary care physician. [phone] [facility] [address]:    Current Outpatient Medications:     lisinopril (PRINIVIL;ZESTRIL) 5 MG tablet, Take 1 tablet by mouth daily, Disp: 90 tablet, Rfl: 1    Misc. Devices (WALKER) MISC, Duncan Mixer #1, Disp: , Rfl:     omeprazole (PRILOSEC) 40 MG delayed release capsule, Take 1 capsule by mouth every morning (before breakfast), Disp: , Rfl:   No Known Allergies  Past Medical History:   Diagnosis Date    Altered gait     Arthritis     CAD (coronary artery disease)     pt denies    GERD (gastroesophageal reflux disease)     occ PRN medications    History of illicit drug use     patient denies current drug use  11/24/20    Hypertension     pt denies     Kidney stone     Mitral valve regurgitation 06/08/2011    1+    PUD (peptic ulcer disease)     1985    Smoker     approx one pack daily - 35 years      . pmh  Past Surgical History:   Procedure Laterality Date    COLONOSCOPY      ORTHOPEDIC SURGERY

## 2023-08-25 PROBLEM — M47.26 OSTEOARTHRITIS OF SPINE WITH RADICULOPATHY, LUMBAR REGION: Status: ACTIVE | Noted: 2023-08-25

## 2023-09-14 ENCOUNTER — OFFICE VISIT (OUTPATIENT)
Dept: INTERNAL MEDICINE CLINIC | Facility: CLINIC | Age: 68
End: 2023-09-14
Payer: MEDICARE

## 2023-09-14 VITALS
SYSTOLIC BLOOD PRESSURE: 141 MMHG | BODY MASS INDEX: 22.78 KG/M2 | HEIGHT: 72 IN | DIASTOLIC BLOOD PRESSURE: 90 MMHG | TEMPERATURE: 98.1 F | OXYGEN SATURATION: 98 % | WEIGHT: 168.2 LBS | HEART RATE: 88 BPM

## 2023-09-14 DIAGNOSIS — I10 PRIMARY HYPERTENSION: ICD-10-CM

## 2023-09-14 DIAGNOSIS — E55.9 VITAMIN D DEFICIENCY, UNSPECIFIED: ICD-10-CM

## 2023-09-14 DIAGNOSIS — E55.9 VITAMIN D DEFICIENCY: Primary | ICD-10-CM

## 2023-09-14 PROCEDURE — 3080F DIAST BP >= 90 MM HG: CPT | Performed by: INTERNAL MEDICINE

## 2023-09-14 PROCEDURE — 4004F PT TOBACCO SCREEN RCVD TLK: CPT | Performed by: INTERNAL MEDICINE

## 2023-09-14 PROCEDURE — 99214 OFFICE O/P EST MOD 30 MIN: CPT | Performed by: INTERNAL MEDICINE

## 2023-09-14 PROCEDURE — G8427 DOCREV CUR MEDS BY ELIG CLIN: HCPCS | Performed by: INTERNAL MEDICINE

## 2023-09-14 PROCEDURE — 3017F COLORECTAL CA SCREEN DOC REV: CPT | Performed by: INTERNAL MEDICINE

## 2023-09-14 PROCEDURE — 1123F ACP DISCUSS/DSCN MKR DOCD: CPT | Performed by: INTERNAL MEDICINE

## 2023-09-14 PROCEDURE — G8420 CALC BMI NORM PARAMETERS: HCPCS | Performed by: INTERNAL MEDICINE

## 2023-09-14 PROCEDURE — 3077F SYST BP >= 140 MM HG: CPT | Performed by: INTERNAL MEDICINE

## 2023-09-14 RX ORDER — LISINOPRIL 10 MG/1
10 TABLET ORAL DAILY
Qty: 90 TABLET | Refills: 1 | Status: SHIPPED | OUTPATIENT
Start: 2023-09-14

## 2023-09-14 ASSESSMENT — PATIENT HEALTH QUESTIONNAIRE - PHQ9
SUM OF ALL RESPONSES TO PHQ QUESTIONS 1-9: 0
SUM OF ALL RESPONSES TO PHQ QUESTIONS 1-9: 0
2. FEELING DOWN, DEPRESSED OR HOPELESS: 0
SUM OF ALL RESPONSES TO PHQ QUESTIONS 1-9: 0
1. LITTLE INTEREST OR PLEASURE IN DOING THINGS: 0
SUM OF ALL RESPONSES TO PHQ QUESTIONS 1-9: 0
SUM OF ALL RESPONSES TO PHQ9 QUESTIONS 1 & 2: 0

## 2023-09-14 NOTE — ASSESSMENT & PLAN NOTE
Elevated BP in office. Key Anti-Hypertensive Meds          lisinopril (PRINIVIL;ZESTRIL) 5 MG tablet (Taking)    Sig - Route: Take 1 tablet by mouth daily - Oral        Offering dose increase to 10 mg.

## 2023-09-14 NOTE — ACP (ADVANCE CARE PLANNING)
Advance Care Planning   The patient has the following advanced directives on file:  Advance Directives       Power of [address] Will ACP-Advance Directive ACP-Power of     Not on File Not on File Not on File Not on File            The patient has appointed the following active healthcare agents:    Primary Decision Maker: Dilcia Richmond - [phone]    The Patient has the following current code status:    Code Status: Not on file    Visit Documentation:  I discussed [address] [place] [address] with Viktoriya Hollins today which included the importance of making their choices for care and treatment in the case of a health event that adversely affects their decision-making abilities. He has not completed the Advance Care Directives. He does not have an active health care agent at this time. Viktoriya Hollins was encouraged to complete the declaration forms and provide a signed copy of his medical records.          Praful Iyer  9/14/2023

## 2023-12-04 ENCOUNTER — OFFICE VISIT (OUTPATIENT)
Dept: INTERNAL MEDICINE CLINIC | Facility: CLINIC | Age: 68
End: 2023-12-04
Payer: MEDICARE

## 2023-12-04 VITALS
BODY MASS INDEX: 22.08 KG/M2 | SYSTOLIC BLOOD PRESSURE: 112 MMHG | WEIGHT: 163 LBS | DIASTOLIC BLOOD PRESSURE: 75 MMHG | OXYGEN SATURATION: 95 % | RESPIRATION RATE: 17 BRPM | HEIGHT: 72 IN

## 2023-12-04 DIAGNOSIS — Z87.891 PERSONAL HISTORY OF TOBACCO USE: ICD-10-CM

## 2023-12-04 DIAGNOSIS — I10 PRIMARY HYPERTENSION: ICD-10-CM

## 2023-12-04 DIAGNOSIS — Z00.00 MEDICARE ANNUAL WELLNESS VISIT, SUBSEQUENT: Primary | ICD-10-CM

## 2023-12-04 DIAGNOSIS — E55.9 VITAMIN D DEFICIENCY: ICD-10-CM

## 2023-12-04 PROCEDURE — G0439 PPPS, SUBSEQ VISIT: HCPCS | Performed by: INTERNAL MEDICINE

## 2023-12-04 PROCEDURE — 1123F ACP DISCUSS/DSCN MKR DOCD: CPT | Performed by: INTERNAL MEDICINE

## 2023-12-04 PROCEDURE — G8484 FLU IMMUNIZE NO ADMIN: HCPCS | Performed by: INTERNAL MEDICINE

## 2023-12-04 PROCEDURE — G0296 VISIT TO DETERM LDCT ELIG: HCPCS | Performed by: INTERNAL MEDICINE

## 2023-12-04 PROCEDURE — 3017F COLORECTAL CA SCREEN DOC REV: CPT | Performed by: INTERNAL MEDICINE

## 2023-12-04 PROCEDURE — 3078F DIAST BP <80 MM HG: CPT | Performed by: INTERNAL MEDICINE

## 2023-12-04 PROCEDURE — 3074F SYST BP LT 130 MM HG: CPT | Performed by: INTERNAL MEDICINE

## 2023-12-04 RX ORDER — LISINOPRIL 10 MG/1
10 TABLET ORAL DAILY
Qty: 90 TABLET | Refills: 1 | Status: SHIPPED | OUTPATIENT
Start: 2023-12-04

## 2023-12-04 ASSESSMENT — PATIENT HEALTH QUESTIONNAIRE - PHQ9
2. FEELING DOWN, DEPRESSED OR HOPELESS: 0
SUM OF ALL RESPONSES TO PHQ QUESTIONS 1-9: 0
1. LITTLE INTEREST OR PLEASURE IN DOING THINGS: 0
SUM OF ALL RESPONSES TO PHQ QUESTIONS 1-9: 0
SUM OF ALL RESPONSES TO PHQ QUESTIONS 1-9: 0
SUM OF ALL RESPONSES TO PHQ9 QUESTIONS 1 & 2: 0
SUM OF ALL RESPONSES TO PHQ QUESTIONS 1-9: 0

## 2024-06-15 DIAGNOSIS — I10 PRIMARY HYPERTENSION: ICD-10-CM

## 2024-06-17 RX ORDER — LISINOPRIL 10 MG/1
10 TABLET ORAL DAILY
Qty: 30 TABLET | Refills: 0 | Status: SHIPPED | OUTPATIENT
Start: 2024-06-17

## 2024-08-12 DIAGNOSIS — I10 PRIMARY HYPERTENSION: ICD-10-CM

## 2024-08-13 DIAGNOSIS — I10 PRIMARY HYPERTENSION: ICD-10-CM

## 2024-08-13 RX ORDER — LISINOPRIL 10 MG/1
10 TABLET ORAL DAILY
Qty: 30 TABLET | Refills: 0 | OUTPATIENT
Start: 2024-08-13

## 2024-08-13 RX ORDER — LISINOPRIL 10 MG/1
10 TABLET ORAL DAILY
Qty: 30 TABLET | Refills: 1 | Status: SHIPPED | OUTPATIENT
Start: 2024-08-13

## 2024-08-13 NOTE — TELEPHONE ENCOUNTER
Patient requests a refill of Lisinopril 10 mg, taking one tablet a day - last prescribed by Dr Sánchez 6-17-24 #30 [address]RF  [facility]  Last seen 12-4-23  no future OV - was supposed to see Dr. Lopez on 7-17-24    Request sent to covering practitioner Dr. Finney   Abd exam benign, feels better after meds. Pt would rather follow up outpt for US with GYN. States she had an US at [facility] 3 wks ago, showed report that showed 3mm cyst on left ovary with good blood flow. States she is feeling better and would rather f/u. VSS. abd exam benign, nondistended. joint decision making model used.

## 2024-10-07 DIAGNOSIS — I10 PRIMARY HYPERTENSION: ICD-10-CM

## 2024-10-07 RX ORDER — LISINOPRIL 10 MG/1
10 TABLET ORAL DAILY
Qty: 30 TABLET | Refills: 1 | OUTPATIENT
Start: 2024-10-07

## 2024-11-21 ENCOUNTER — OFFICE VISIT (OUTPATIENT)
Dept: INTERNAL MEDICINE CLINIC | Facility: CLINIC | Age: 69
End: 2024-11-21

## 2024-11-21 VITALS
HEIGHT: 72 IN | OXYGEN SATURATION: 96 % | TEMPERATURE: 97.5 F | DIASTOLIC BLOOD PRESSURE: 85 MMHG | SYSTOLIC BLOOD PRESSURE: 145 MMHG | BODY MASS INDEX: 23.7 KG/M2 | HEART RATE: 75 BPM | WEIGHT: 175 LBS

## 2024-11-21 DIAGNOSIS — Z12.11 SCREENING FOR COLON CANCER: ICD-10-CM

## 2024-11-21 DIAGNOSIS — K21.9 GASTROESOPHAGEAL REFLUX DISEASE, UNSPECIFIED WHETHER ESOPHAGITIS PRESENT: ICD-10-CM

## 2024-11-21 DIAGNOSIS — E78.00 PURE HYPERCHOLESTEROLEMIA: ICD-10-CM

## 2024-11-21 DIAGNOSIS — Z96.641 HX OF TOTAL HIP ARTHROPLASTY, RIGHT: ICD-10-CM

## 2024-11-21 DIAGNOSIS — E55.9 VITAMIN D DEFICIENCY: ICD-10-CM

## 2024-11-21 DIAGNOSIS — I10 PRIMARY HYPERTENSION: Primary | ICD-10-CM

## 2024-11-21 DIAGNOSIS — F17.210 CONTINUOUS DEPENDENCE ON CIGARETTE SMOKING: ICD-10-CM

## 2024-11-21 DIAGNOSIS — M47.816 LUMBAR SPONDYLOSIS: ICD-10-CM

## 2024-11-21 DIAGNOSIS — Z12.5 ENCOUNTER FOR SCREENING FOR MALIGNANT NEOPLASM OF PROSTATE: ICD-10-CM

## 2024-11-21 LAB
25(OH)D3 SERPL-MCNC: 90.7 NG/ML (ref 30–100)
ALBUMIN SERPL-MCNC: 3.6 G/DL (ref 3.2–4.6)
ALBUMIN/GLOB SERPL: 0.9 (ref 1–1.9)
ALP SERPL-CCNC: 88 U/L (ref 40–129)
ALT SERPL-CCNC: 14 U/L (ref 8–55)
ANION GAP SERPL CALC-SCNC: 10 MMOL/L (ref 7–16)
AST SERPL-CCNC: 21 U/L (ref 15–37)
BASOPHILS # BLD: 0.1 K/UL (ref 0–0.2)
BASOPHILS NFR BLD: 1 % (ref 0–2)
BILIRUB SERPL-MCNC: 0.4 MG/DL (ref 0–1.2)
BUN SERPL-MCNC: 13 MG/DL (ref 8–23)
CALCIUM SERPL-MCNC: 9.8 MG/DL (ref 8.8–10.2)
CHLORIDE SERPL-SCNC: 105 MMOL/L (ref 98–107)
CHOLEST SERPL-MCNC: 176 MG/DL (ref 0–200)
CO2 SERPL-SCNC: 26 MMOL/L (ref 20–29)
CREAT SERPL-MCNC: 1.22 MG/DL (ref 0.8–1.3)
DIFFERENTIAL METHOD BLD: NORMAL
EOSINOPHIL # BLD: 0.2 K/UL (ref 0–0.8)
EOSINOPHIL NFR BLD: 3 % (ref 0.5–7.8)
ERYTHROCYTE [DISTWIDTH] IN BLOOD BY AUTOMATED COUNT: 13.2 % (ref 11.9–14.6)
GLOBULIN SER CALC-MCNC: 4 G/DL (ref 2.3–3.5)
GLUCOSE SERPL-MCNC: 111 MG/DL (ref 70–99)
HCT VFR BLD AUTO: 48.3 % (ref 41.1–50.3)
HDLC SERPL-MCNC: 57 MG/DL (ref 40–60)
HDLC SERPL: 3.1 (ref 0–5)
HGB BLD-MCNC: 15.5 G/DL (ref 13.6–17.2)
IMM GRANULOCYTES # BLD AUTO: 0 K/UL (ref 0–0.5)
IMM GRANULOCYTES NFR BLD AUTO: 0 % (ref 0–5)
LDLC SERPL CALC-MCNC: 107 MG/DL (ref 0–100)
LYMPHOCYTES # BLD: 1.7 K/UL (ref 0.5–4.6)
LYMPHOCYTES NFR BLD: 26 % (ref 13–44)
MCH RBC QN AUTO: 28.8 PG (ref 26.1–32.9)
MCHC RBC AUTO-ENTMCNC: 32.1 G/DL (ref 31.4–35)
MCV RBC AUTO: 89.8 FL (ref 82–102)
MONOCYTES # BLD: 0.6 K/UL (ref 0.1–1.3)
MONOCYTES NFR BLD: 9 % (ref 4–12)
NEUTS SEG # BLD: 4.1 K/UL (ref 1.7–8.2)
NEUTS SEG NFR BLD: 61 % (ref 43–78)
NRBC # BLD: 0 K/UL (ref 0–0.2)
PLATELET # BLD AUTO: 249 K/UL (ref 150–450)
PMV BLD AUTO: 11 FL (ref 9.4–12.3)
POTASSIUM SERPL-SCNC: 3.9 MMOL/L (ref 3.5–5.1)
PROT SERPL-MCNC: 7.6 G/DL (ref 6.3–8.2)
PSA SERPL-MCNC: 1.2 NG/ML (ref 0–4)
RBC # BLD AUTO: 5.38 M/UL (ref 4.23–5.6)
SODIUM SERPL-SCNC: 141 MMOL/L (ref 136–145)
TRIGL SERPL-MCNC: 59 MG/DL (ref 0–150)
TSH, 3RD GENERATION: 0.85 UIU/ML (ref 0.27–4.2)
VLDLC SERPL CALC-MCNC: 12 MG/DL (ref 6–23)
WBC # BLD AUTO: 6.7 K/UL (ref 4.3–11.1)

## 2024-11-21 RX ORDER — LISINOPRIL 10 MG/1
10 TABLET ORAL DAILY
Qty: 90 TABLET | Refills: 0 | Status: SHIPPED | OUTPATIENT
Start: 2024-11-21 | End: 2024-11-21 | Stop reason: DRUGHIGH

## 2024-11-21 RX ORDER — BLOOD PRESSURE TEST KIT
1 KIT MISCELLANEOUS DAILY
Qty: 1 KIT | Refills: 0 | Status: SHIPPED | OUTPATIENT
Start: 2024-11-21

## 2024-11-21 RX ORDER — OMEPRAZOLE 40 MG/1
40 CAPSULE, DELAYED RELEASE ORAL
Qty: 90 CAPSULE | Refills: 0 | Status: SHIPPED | OUTPATIENT
Start: 2024-11-21

## 2024-11-21 RX ORDER — LISINOPRIL 20 MG/1
20 TABLET ORAL DAILY
Qty: 90 TABLET | Refills: 0 | Status: SHIPPED | OUTPATIENT
Start: 2024-11-21

## 2024-11-21 SDOH — ECONOMIC STABILITY: INCOME INSECURITY: HOW HARD IS IT FOR YOU TO PAY FOR THE VERY BASICS LIKE FOOD, HOUSING, MEDICAL CARE, AND HEATING?: SOMEWHAT HARD

## 2024-11-21 SDOH — ECONOMIC STABILITY: FOOD INSECURITY: WITHIN THE PAST 12 MONTHS, YOU WORRIED THAT YOUR FOOD WOULD RUN OUT BEFORE YOU GOT MONEY TO BUY MORE.: NEVER TRUE

## 2024-11-21 SDOH — ECONOMIC STABILITY: FOOD INSECURITY: WITHIN THE PAST 12 MONTHS, THE FOOD YOU BOUGHT JUST DIDN'T LAST AND YOU DIDN'T HAVE MONEY TO GET MORE.: SOMETIMES TRUE

## 2024-11-21 ASSESSMENT — PATIENT HEALTH QUESTIONNAIRE - PHQ9
SUM OF ALL RESPONSES TO PHQ QUESTIONS 1-9: 0
SUM OF ALL RESPONSES TO PHQ9 QUESTIONS 1 & 2: 0
2. FEELING DOWN, DEPRESSED OR HOPELESS: NOT AT ALL
SUM OF ALL RESPONSES TO PHQ QUESTIONS 1-9: 0
SUM OF ALL RESPONSES TO PHQ QUESTIONS 1-9: 0
1. LITTLE INTEREST OR PLEASURE IN DOING THINGS: NOT AT ALL
SUM OF ALL RESPONSES TO PHQ QUESTIONS 1-9: 0

## 2024-11-21 ASSESSMENT — ENCOUNTER SYMPTOMS
COUGH: 0
ABDOMINAL PAIN: 0
SORE THROAT: 0
NAUSEA: 0
SINUS PAIN: 0
SHORTNESS OF BREATH: 0
RHINORRHEA: 0
EYE PAIN: 0
VOMITING: 0
CONSTIPATION: 0
DIARRHEA: 0
BACK PAIN: 1

## 2024-11-21 NOTE — PATIENT INSTRUCTIONS
Please arrive 20 minutes prior to your appointment time to allow time for checking in at the  and rooming with the medical assistant. Please bring your medication bottles at each visit.     We noticed on a recent visit that we missed an opportunity to add the additional resources you requested. We have attached them to the After Visit Summary for the date of service 11/21/2024 for your review.  Should you have any questions regarding the resources, please contact our office.    [facility] Financial Resources*  (Call [employer]/[facility] if you need more resources.)    [employer] Financial Assistance  They offer: help uninsured patients who do not qualify for government-sponsored health insurance and cannot afford to pay for their medical care. Insured patients may also qualify depending on family income, family size, and medical needs.   Contact: [phone]   Helpful Info: How to apply-  Option 1: Fill out the Financial Assistance Application(FAP). Copies of the Financial Assistance Application and the FAP may be obtained for free by calling the [employer] service department at [phone].   Option 2: download a copy from the [employer] website: [email or web address]     [employer]    Eligibility Assistance: [phone]    FOCUS  They offer: medication cost assistance, personal care items, and small durable medical equipment to low income and uninsured patients with chronic health conditions.   Contact: [phone] or [phone]     Wellness Outreach  They offer: connections to financial assistance for social and medical services for low income and uninsured persons.   Contact: [phone] (leave message with name and contact number if no answer).    Utility Assistance     [facility] Low Income Home Energy Assistance Program  They offer: energy assistance.  Contact: [phone];

## 2024-11-21 NOTE — PROGRESS NOTES
[facility]  [address]  [place] [address]  Tel# [phone]  Fax# [phone]     Mariam Coppola, JOB, FNP-BC  Family Nurse Practitioner            Date of Visit: 2024     Shlomo Rudolph Jr. (: 1955) is a 69 y.o. male  established [facility] patient, here for evaluation of the following chief complaint(s):    Chief Complaint   Patient presents with    New Patient     New to provider. Patient would like to establish care. Patient is fasting for labwork         Patient Care Team:  None, None as PCP - General         History of Present Illness        New to Provider  Presents here as a transfer pt from Dr. ALEX Lopez whom left the practice 2024.  Pt had established care with Dr. Lopez on 2023. LOV with him 2023 for Medicare Wellness.  Pt with no lab results from ordered labs by Dr. Lopez dates 2023 and 2023.  Previous PCP Dr. Franco of [facility].        Hypertension  Chronic    Med: Lisinopril 10 mg 1 tab oral daily      BP Readings from Last 3 Encounters:   24 (!) 145/85   23 112/75   23 (!) 141/90              Hx of Hyperlipidemia      Lab Results   Component Value Date    CHOL 158 2023    CHOL 174 2020    CHOL 159 2019     Lab Results   Component Value Date    TRIG 46 2023    TRIG 72 2020    TRIG 54 2019     Lab Results   Component Value Date    HDL 55 2023    HDL 47 2020    HDL 56 2019     Lab Results   Component Value Date    LDL 93.8 2023     (H) 2020    LDL 92 2019     Lab Results   Component Value Date    VLDL 9.2 2023    VLDL 14 2020    VLDL 11 2019     Lab Results   Component Value Date    CHOLHDLRATIO 2.9 2023            Acid Reflux  Takes Omeprazole 40 mg 1 cap oral daily, takes as needed.        Vitamin D deficiency    Has been taking Vit D3 125 mcg (5,000 UT) oral daily    Lab Results   Component Value Date/Time

## 2024-11-22 DIAGNOSIS — R73.09 ABNORMAL GLUCOSE: Primary | ICD-10-CM

## 2024-11-22 DIAGNOSIS — R73.09 ABNORMAL GLUCOSE: ICD-10-CM

## 2024-11-22 LAB
EST. AVERAGE GLUCOSE BLD GHB EST-MCNC: 128 MG/DL
HBA1C MFR BLD: 6.1 % (ref 0–5.6)

## 2024-12-03 ENCOUNTER — OFFICE VISIT (OUTPATIENT)
Dept: INTERNAL MEDICINE CLINIC | Facility: CLINIC | Age: 69
End: 2024-12-03

## 2024-12-03 VITALS
TEMPERATURE: 99 F | BODY MASS INDEX: 24.08 KG/M2 | SYSTOLIC BLOOD PRESSURE: 138 MMHG | WEIGHT: 177.8 LBS | OXYGEN SATURATION: 96 % | HEIGHT: 72 IN | DIASTOLIC BLOOD PRESSURE: 75 MMHG | HEART RATE: 70 BPM

## 2024-12-03 DIAGNOSIS — E78.00 PURE HYPERCHOLESTEROLEMIA: ICD-10-CM

## 2024-12-03 DIAGNOSIS — E55.9 VITAMIN D DEFICIENCY: ICD-10-CM

## 2024-12-03 DIAGNOSIS — Z96.641 HX OF TOTAL HIP ARTHROPLASTY, RIGHT: ICD-10-CM

## 2024-12-03 DIAGNOSIS — Z12.11 SCREENING FOR COLON CANCER: ICD-10-CM

## 2024-12-03 DIAGNOSIS — I10 PRIMARY HYPERTENSION: ICD-10-CM

## 2024-12-03 DIAGNOSIS — Z00.00 MEDICARE ANNUAL WELLNESS VISIT, SUBSEQUENT: Primary | ICD-10-CM

## 2024-12-03 DIAGNOSIS — F17.210 CONTINUOUS DEPENDENCE ON CIGARETTE SMOKING: ICD-10-CM

## 2024-12-03 DIAGNOSIS — R73.03 PREDIABETES: ICD-10-CM

## 2024-12-03 DIAGNOSIS — K21.9 GASTROESOPHAGEAL REFLUX DISEASE, UNSPECIFIED WHETHER ESOPHAGITIS PRESENT: ICD-10-CM

## 2024-12-03 ASSESSMENT — ENCOUNTER SYMPTOMS
SINUS PAIN: 0
VOMITING: 0
EYE PAIN: 0
NAUSEA: 0
DIARRHEA: 0
COUGH: 0
BACK PAIN: 0
SORE THROAT: 0
SHORTNESS OF BREATH: 0
RHINORRHEA: 0
CONSTIPATION: 0
ABDOMINAL PAIN: 0

## 2024-12-03 ASSESSMENT — LIFESTYLE VARIABLES
HOW MANY STANDARD DRINKS CONTAINING ALCOHOL DO YOU HAVE ON A TYPICAL DAY: 3 OR 4
HOW OFTEN DO YOU HAVE A DRINK CONTAINING ALCOHOL: MONTHLY OR LESS

## 2024-12-03 ASSESSMENT — PATIENT HEALTH QUESTIONNAIRE - PHQ9
SUM OF ALL RESPONSES TO PHQ9 QUESTIONS 1 & 2: 0
SUM OF ALL RESPONSES TO PHQ QUESTIONS 1-9: 0
2. FEELING DOWN, DEPRESSED OR HOPELESS: NOT AT ALL
SUM OF ALL RESPONSES TO PHQ QUESTIONS 1-9: 0
SUM OF ALL RESPONSES TO PHQ QUESTIONS 1-9: 0
1. LITTLE INTEREST OR PLEASURE IN DOING THINGS: NOT AT ALL
SUM OF ALL RESPONSES TO PHQ QUESTIONS 1-9: 0

## 2024-12-03 NOTE — PROGRESS NOTES
[facility]  [address]  [place] [address]  Tel# [phone]  Fax# [phone]     Mariam Coppola, DNP, FNP-BC  Family Nurse Practitioner            Date of Visit: 2024     Shlomo Rudolph Jr. (: 1955) is a 69 y.o. male  established patient, here for evaluation of the following chief complaint(s):    Chief Complaint   Patient presents with    Medicare AW     Medicare Wellness Visit         Patient Care Team:  None, None as PCP - General         History of Present Illness      Presents here for Annual Medicare Wellness visit.    Pt a  a transfer pt from Dr. ALEX Lopez whom left the practice 2024.  Pt had established care with Dr. Lopez on 2023. LOV with him 2023 for Medicare Wellness.  Pt with no lab results from ordered labs by Dr. Lopez dates 2023 and 2023.  Previous PCP Dr. Franco and Teresa Smith, TOR of [facility].           Hypertension  Chronic     Med: Lisinopril 10 mg 1 tab oral daily            BP Readings from Last 3 Encounters:   24 (!) 145/85   23 112/75   23 (!) 141/90         12/3/2024    Med: Lisinopril 20 mg 1 tab oral daily.    BP Readings from Last 3 Encounters:   24 138/75   24 (!) 145/85   23 112/75             Hx of Hyperlipidemia      Lab Results   Component Value Date    CHOL 176 2024    CHOL 158 2023    CHOL 174 2020     Lab Results   Component Value Date    TRIG 59 2024    TRIG 46 2023    TRIG 72 2020     Lab Results   Component Value Date    HDL 57 2024    HDL 55 2023    HDL 47 2020     Lab Results   Component Value Date     (H) 2024    LDL 93.8 2023     (H) 2020     Lab Results   Component Value Date    VLDL 12 2024    VLDL 9.2 2023    VLDL 14 2020     Lab Results   Component Value Date    CHOLHDLRATIO 3.1 2024    CHOLHDLRATIO 2.9 2023

## 2024-12-03 NOTE — PATIENT INSTRUCTIONS

## 2024-12-27 ENCOUNTER — HOSPITAL ENCOUNTER (EMERGENCY)
Age: 69
Discharge: HOME | End: 2024-12-27
Attending: EMERGENCY MEDICINE
Payer: MEDICARE

## 2024-12-27 ENCOUNTER — APPOINTMENT (OUTPATIENT)
Dept: CT IMAGING | Age: 69
End: 2024-12-27
Payer: MEDICARE

## 2024-12-27 VITALS
OXYGEN SATURATION: 97 % | HEART RATE: 79 BPM | RESPIRATION RATE: 19 BRPM | BODY MASS INDEX: 23.57 KG/M2 | DIASTOLIC BLOOD PRESSURE: 75 MMHG | TEMPERATURE: 98.9 F | SYSTOLIC BLOOD PRESSURE: 177 MMHG | WEIGHT: 174 LBS | HEIGHT: 72 IN

## 2024-12-27 DIAGNOSIS — R10.84 GENERALIZED ABDOMINAL PAIN: ICD-10-CM

## 2024-12-27 DIAGNOSIS — N30.01 ACUTE CYSTITIS WITH HEMATURIA: ICD-10-CM

## 2024-12-27 DIAGNOSIS — J10.1 INFLUENZA A: Primary | ICD-10-CM

## 2024-12-27 LAB
ALBUMIN SERPL-MCNC: 3.7 G/DL (ref 3.2–4.6)
ALBUMIN/GLOB SERPL: 1 (ref 1–1.9)
ALP SERPL-CCNC: 74 U/L (ref 40–129)
ALT SERPL-CCNC: 13 U/L (ref 8–55)
AMPHET UR QL SCN: NEGATIVE
ANION GAP SERPL CALC-SCNC: 13 MMOL/L (ref 7–16)
APPEARANCE UR: CLEAR
AST SERPL-CCNC: 20 U/L (ref 15–37)
BACTERIA URNS QL MICRO: ABNORMAL /HPF
BARBITURATES UR QL SCN: NEGATIVE
BASOPHILS # BLD: 0.1 K/UL (ref 0–0.2)
BASOPHILS NFR BLD: 1 % (ref 0–2)
BENZODIAZ UR QL: NEGATIVE
BILIRUB SERPL-MCNC: 0.5 MG/DL (ref 0–1.2)
BILIRUB UR QL: NEGATIVE
BUN SERPL-MCNC: 13 MG/DL (ref 8–23)
CALCIUM SERPL-MCNC: 9.6 MG/DL (ref 8.8–10.2)
CANNABINOIDS UR QL SCN: NEGATIVE
CHLORIDE SERPL-SCNC: 104 MMOL/L (ref 98–107)
CO2 SERPL-SCNC: 23 MMOL/L (ref 20–29)
COCAINE UR QL SCN: POSITIVE
COLOR UR: ABNORMAL
CREAT SERPL-MCNC: 1.29 MG/DL (ref 0.8–1.3)
DIFFERENTIAL METHOD BLD: ABNORMAL
EOSINOPHIL # BLD: 0.1 K/UL (ref 0–0.8)
EOSINOPHIL NFR BLD: 1 % (ref 0.5–7.8)
EPI CELLS #/AREA URNS HPF: ABNORMAL /HPF
ERYTHROCYTE [DISTWIDTH] IN BLOOD BY AUTOMATED COUNT: 13.3 % (ref 11.9–14.6)
FLUAV RNA SPEC QL NAA+PROBE: DETECTED
FLUBV RNA SPEC QL NAA+PROBE: NOT DETECTED
GLOBULIN SER CALC-MCNC: 3.8 G/DL (ref 2.3–3.5)
GLUCOSE SERPL-MCNC: 95 MG/DL (ref 70–99)
GLUCOSE UR STRIP.AUTO-MCNC: 100 MG/DL
HCT VFR BLD AUTO: 47.7 % (ref 41.1–50.3)
HGB BLD-MCNC: 15.4 G/DL (ref 13.6–17.2)
HGB UR QL STRIP: ABNORMAL
IMM GRANULOCYTES # BLD AUTO: 0 K/UL (ref 0–0.5)
IMM GRANULOCYTES NFR BLD AUTO: 0 % (ref 0–5)
KETONES UR QL STRIP.AUTO: NEGATIVE MG/DL
LACTATE SERPL-SCNC: 1.9 MMOL/L (ref 0.5–2)
LEUKOCYTE ESTERASE UR QL STRIP.AUTO: ABNORMAL
LIPASE SERPL-CCNC: 23 U/L (ref 13–60)
LYMPHOCYTES # BLD: 0.7 K/UL (ref 0.5–4.6)
LYMPHOCYTES NFR BLD: 8 % (ref 13–44)
MCH RBC QN AUTO: 28.7 PG (ref 26.1–32.9)
MCHC RBC AUTO-ENTMCNC: 32.3 G/DL (ref 31.4–35)
MCV RBC AUTO: 89 FL (ref 82–102)
METHADONE UR QL: NEGATIVE
MONOCYTES # BLD: 1.2 K/UL (ref 0.1–1.3)
MONOCYTES NFR BLD: 15 % (ref 4–12)
MUCOUS THREADS URNS QL MICRO: ABNORMAL /LPF
NEUTS SEG # BLD: 6 K/UL (ref 1.7–8.2)
NEUTS SEG NFR BLD: 75 % (ref 43–78)
NITRITE UR QL STRIP.AUTO: POSITIVE
NRBC # BLD: 0 K/UL (ref 0–0.2)
OPIATES UR QL: NEGATIVE
OTHER OBSERVATIONS: ABNORMAL
PCP UR QL: NEGATIVE
PH UR STRIP: 5.5 (ref 5–9)
PLATELET # BLD AUTO: 232 K/UL (ref 150–450)
PMV BLD AUTO: 9.8 FL (ref 9.4–12.3)
POTASSIUM SERPL-SCNC: 3.9 MMOL/L (ref 3.5–5.1)
PROT SERPL-MCNC: 7.5 G/DL (ref 6.3–8.2)
PROT UR STRIP-MCNC: ABNORMAL MG/DL
RBC # BLD AUTO: 5.36 M/UL (ref 4.23–5.6)
RBC #/AREA URNS HPF: ABNORMAL /HPF
SARS-COV-2 RDRP RESP QL NAA+PROBE: NOT DETECTED
SODIUM SERPL-SCNC: 140 MMOL/L (ref 136–145)
SOURCE: NORMAL
SP GR UR REFRACTOMETRY: 1.02 (ref 1–1.02)
TROPONIN T SERPL HS-MCNC: 13 NG/L (ref 0–22)
UROBILINOGEN UR QL STRIP.AUTO: 0.2 EU/DL (ref 0.2–1)
WBC # BLD AUTO: 8.1 K/UL (ref 4.3–11.1)
WBC URNS QL MICRO: ABNORMAL /HPF
YEAST URNS QL MICRO: ABNORMAL

## 2024-12-27 PROCEDURE — 84484 ASSAY OF TROPONIN QUANT: CPT

## 2024-12-27 PROCEDURE — 83690 ASSAY OF LIPASE: CPT

## 2024-12-27 PROCEDURE — 87635 SARS-COV-2 COVID-19 AMP PRB: CPT

## 2024-12-27 PROCEDURE — 83605 ASSAY OF LACTIC ACID: CPT

## 2024-12-27 PROCEDURE — 6360000002 HC RX W HCPCS: Performed by: EMERGENCY MEDICINE

## 2024-12-27 PROCEDURE — 87086 URINE CULTURE/COLONY COUNT: CPT

## 2024-12-27 PROCEDURE — 99285 EMERGENCY DEPT VISIT HI MDM: CPT

## 2024-12-27 PROCEDURE — 80307 DRUG TEST PRSMV CHEM ANLYZR: CPT

## 2024-12-27 PROCEDURE — 96375 TX/PRO/DX INJ NEW DRUG ADDON: CPT

## 2024-12-27 PROCEDURE — 81001 URINALYSIS AUTO W/SCOPE: CPT

## 2024-12-27 PROCEDURE — 87502 INFLUENZA DNA AMP PROBE: CPT

## 2024-12-27 PROCEDURE — 93005 ELECTROCARDIOGRAM TRACING: CPT | Performed by: EMERGENCY MEDICINE

## 2024-12-27 PROCEDURE — 96361 HYDRATE IV INFUSION ADD-ON: CPT

## 2024-12-27 PROCEDURE — 74177 CT ABD & PELVIS W/CONTRAST: CPT

## 2024-12-27 PROCEDURE — 6360000004 HC RX CONTRAST MEDICATION: Performed by: EMERGENCY MEDICINE

## 2024-12-27 PROCEDURE — 96374 THER/PROPH/DIAG INJ IV PUSH: CPT

## 2024-12-27 PROCEDURE — 2580000003 HC RX 258: Performed by: EMERGENCY MEDICINE

## 2024-12-27 PROCEDURE — 6370000000 HC RX 637 (ALT 250 FOR IP): Performed by: EMERGENCY MEDICINE

## 2024-12-27 PROCEDURE — 85025 COMPLETE CBC W/AUTO DIFF WBC: CPT

## 2024-12-27 PROCEDURE — 80053 COMPREHEN METABOLIC PANEL: CPT

## 2024-12-27 RX ORDER — ONDANSETRON 4 MG/1
4 TABLET, ORALLY DISINTEGRATING ORAL 3 TIMES DAILY PRN
Qty: 9 TABLET | Refills: 0 | Status: SHIPPED | OUTPATIENT
Start: 2024-12-27 | End: 2024-12-30

## 2024-12-27 RX ORDER — IOPAMIDOL 755 MG/ML
100 INJECTION, SOLUTION INTRAVASCULAR
Status: COMPLETED | OUTPATIENT
Start: 2024-12-27 | End: 2024-12-27

## 2024-12-27 RX ORDER — 0.9 % SODIUM CHLORIDE 0.9 %
1000 INTRAVENOUS SOLUTION INTRAVENOUS
Status: COMPLETED | OUTPATIENT
Start: 2024-12-27 | End: 2024-12-27

## 2024-12-27 RX ORDER — KETOROLAC TROMETHAMINE 15 MG/ML
15 INJECTION, SOLUTION INTRAMUSCULAR; INTRAVENOUS ONCE
Status: COMPLETED | OUTPATIENT
Start: 2024-12-27 | End: 2024-12-27

## 2024-12-27 RX ORDER — ONDANSETRON 2 MG/ML
4 INJECTION INTRAMUSCULAR; INTRAVENOUS
Status: COMPLETED | OUTPATIENT
Start: 2024-12-27 | End: 2024-12-27

## 2024-12-27 RX ORDER — OSELTAMIVIR PHOSPHATE 30 MG/1
30 CAPSULE ORAL 2 TIMES DAILY
Qty: 10 CAPSULE | Refills: 0 | Status: SHIPPED | OUTPATIENT
Start: 2024-12-27 | End: 2025-01-01

## 2024-12-27 RX ORDER — CEPHALEXIN 500 MG/1
500 CAPSULE ORAL 3 TIMES DAILY
Qty: 21 CAPSULE | Refills: 0 | Status: SHIPPED | OUTPATIENT
Start: 2024-12-27 | End: 2025-01-03

## 2024-12-27 RX ADMIN — IOPAMIDOL 100 ML: 755 INJECTION, SOLUTION INTRAVENOUS at 19:41

## 2024-12-27 RX ADMIN — CEPHALEXIN 500 MG: 250 CAPSULE ORAL at 23:03

## 2024-12-27 RX ADMIN — KETOROLAC TROMETHAMINE 15 MG: 15 INJECTION, SOLUTION INTRAMUSCULAR; INTRAVENOUS at 20:01

## 2024-12-27 RX ADMIN — SODIUM CHLORIDE 1000 ML: 9 INJECTION, SOLUTION INTRAVENOUS at 18:56

## 2024-12-27 RX ADMIN — ONDANSETRON 4 MG: 2 INJECTION, SOLUTION INTRAMUSCULAR; INTRAVENOUS at 20:01

## 2024-12-27 ASSESSMENT — PAIN DESCRIPTION - LOCATION: LOCATION: ABDOMEN

## 2024-12-27 ASSESSMENT — PAIN DESCRIPTION - ORIENTATION: ORIENTATION: UPPER

## 2024-12-27 ASSESSMENT — PAIN SCALES - GENERAL: PAINLEVEL_OUTOF10: 5

## 2024-12-27 ASSESSMENT — PAIN DESCRIPTION - DESCRIPTORS: DESCRIPTORS: CRAMPING

## 2024-12-27 NOTE — ED PROVIDER NOTES
Emergency Department Provider Note                   PCP:                None, None               Age: 69 y.o.      Sex: male   Final diagnosis/impression:  1. Influenza A    2. Acute cystitis with hematuria    3. Generalized abdominal pain       Disposition: Discharged    MDM/Clinical Course:  Patient seen by myself at the [facility] emergency department. Patient had signs symptoms and clinical history most consistent with possible viral type symptoms, abdominal cramping, overall fair appearing though mildly uncomfortable on initial presentation. My independent analysis/interpretation of laboratory work-up here shows CBC, CMP unremarkable, troponin testing unremarkable.  UDS is positive for cocaine.  COVID testing is negative/unremarkable.  Influenza is positive for influenza A.  Urinalysis is positive for UTI with nitrite positive urine, 10-20 urine white blood cells, trace bacteria. Radiology shows CT abdomen pelvis shows no acute/emergent abnormality. While under my care, patient received IV Toradol, IV fluids, IV Zofran, p.o. Keflex.  Outpatient prescriptions for Keflex, Tamiflu, Zofran ODT written.  Recommended rest, hydration, OTC Tylenol as needed for fever/chills, algia/arthralgias. I recommended close monitoring of symptoms, low threshold to return as needed.  Patient/family given instructions to return as needed for any questions, concerns or worsening symptoms, particularly those as outlined in the disposition section / discharge section of patient discharge paperwork. Patient/family verbalizes understanding and agreement with ED course/plan in shared medical decision making. Questions answered.    Complexity of Problems Addressed:d  1 or more acute illnesses that pose a threat to life or bodily function.     Data Reviewed and Analyzed:    Category 1:   I reviewed external records: provider visit note from PCP.  Reviewed 12/3/2024 primary care provider documentation for follow-up visit  I  other evaluation for today's acute complaint. Patient/family denies any other aggravating or alleviating factors. Patient/family denies any other symptoms.    Last night started with abdominal cramping  No n/v  No diarrhea  Intermittent - last night more regular, decreased frequency tonight  Cramping  No dyspnea  No diaphoresis  No fever      ROS:   All review of systems negative except as noted above in the history of the present illness.    Past Medical/ Family/ Social History:     Medical history:   Past Medical History:   Diagnosis Date    Altered gait     Arthritis     CAD (coronary artery disease)     pt denies    GERD (gastroesophageal reflux disease)     occ PRN medications    History of illicit drug use     patient denies current drug use  11/24/20    Hypertension     pt denies     Kidney stone     Mitral valve regurgitation 06/08/2011    1+    PUD (peptic ulcer disease)     1985    Smoker     approx one pack daily - 35 years      Surgical history:   Past Surgical History:   Procedure Laterality Date    COLONOSCOPY      ORTHOPEDIC SURGERY  1998    cervical spine fusion, dr mejia     TOTAL HIP ARTHROPLASTY Left 2007    TOTAL HIP ARTHROPLASTY Right 2009    Dr pedro    UROLOGICAL SURGERY      kidney stone removal - states doesn't know if was litho or cysto     Family history:   Family History   Problem Relation Age of Onset    Cancer Father      Social history:   Social History     Socioeconomic History    Marital status: Single   Tobacco Use    Smoking status: Every Day     Current packs/day: 1.00     Average packs/day: 1 pack/day for 20.0 years (20.0 ttl pk-yrs)     Types: Cigarettes     Passive exposure: Never    Smokeless tobacco: Never   Vaping Use    Vaping status: Never Used   Substance and Sexual Activity    Alcohol use: Not Currently     Alcohol/week: 4.0 standard drinks of alcohol     Types: 4 Drinks containing 0.5 oz of alcohol per week     Comment: 4 mini botles last night    Drug use: Yes

## 2024-12-27 NOTE — ED TRIAGE NOTES
Pt arrives via EMS coming from home with c/o non-productive cough, upper abdominal cramping, body aches, and nausea since last night. No know sick contacts. Reports abdominal pain worsens after cough. States last BM was last night, \"It was a lot\". Denies diarrhea, noting blood, or dark coloring. Denies shortness of breath or chest pain. , RR 24, SaO2 100% on RA.

## 2024-12-28 LAB
EKG ATRIAL RATE: 93 BPM
EKG DIAGNOSIS: NORMAL
EKG P AXIS: 71 DEGREES
EKG P-R INTERVAL: 154 MS
EKG Q-T INTERVAL: 314 MS
EKG QRS DURATION: 92 MS
EKG QTC CALCULATION (BAZETT): 391 MS
EKG R AXIS: 26 DEGREES
EKG T AXIS: -89 DEGREES
EKG VENTRICULAR RATE: 93 BPM

## 2024-12-28 PROCEDURE — 93010 ELECTROCARDIOGRAM REPORT: CPT | Performed by: INTERNAL MEDICINE

## 2024-12-28 NOTE — ED NOTES
Report given to JOSÉ MIGUEL, RN and care assumed at this time.      Fern Martinez RN  12/27/24 [id number]

## 2024-12-28 NOTE — DISCHARGE INSTRUCTIONS
Please return for any questions, concerns or worsening symptoms, particularly increasing shortness of breath, persistent wheezing, increasing chest pain, passing out episodes, recurrent vomiting, inability keep fluids down by mouth, confusion, lethargy, ill appearance.

## 2024-12-29 LAB
BACTERIA SPEC CULT: NORMAL
SERVICE CMNT-IMP: NORMAL

## 2024-12-30 LAB
BACTERIA SPEC CULT: NORMAL
SERVICE CMNT-IMP: NORMAL

## 2025-02-04 ENCOUNTER — OFFICE VISIT (OUTPATIENT)
Age: 70
End: 2025-02-04
Payer: MEDICARE

## 2025-02-04 DIAGNOSIS — M54.50 LOW BACK PAIN, UNSPECIFIED BACK PAIN LATERALITY, UNSPECIFIED CHRONICITY, UNSPECIFIED WHETHER SCIATICA PRESENT: Primary | ICD-10-CM

## 2025-02-04 DIAGNOSIS — M47.816 FACET ARTHROPATHY, LUMBAR: ICD-10-CM

## 2025-02-04 DIAGNOSIS — Z98.1 STATUS POST CERVICAL ARTHRODESIS: ICD-10-CM

## 2025-02-04 DIAGNOSIS — M50.30 DDD (DEGENERATIVE DISC DISEASE), CERVICAL: ICD-10-CM

## 2025-02-04 DIAGNOSIS — M48.061 DEGENERATIVE LUMBAR SPINAL STENOSIS: ICD-10-CM

## 2025-02-04 DIAGNOSIS — R26.89 MYELOPATHIC GAIT: ICD-10-CM

## 2025-02-04 PROCEDURE — 1159F MED LIST DOCD IN RCRD: CPT | Performed by: PHYSICIAN ASSISTANT

## 2025-02-04 PROCEDURE — G8420 CALC BMI NORM PARAMETERS: HCPCS | Performed by: PHYSICIAN ASSISTANT

## 2025-02-04 PROCEDURE — 4004F PT TOBACCO SCREEN RCVD TLK: CPT | Performed by: PHYSICIAN ASSISTANT

## 2025-02-04 PROCEDURE — 1123F ACP DISCUSS/DSCN MKR DOCD: CPT | Performed by: PHYSICIAN ASSISTANT

## 2025-02-04 PROCEDURE — 99204 OFFICE O/P NEW MOD 45 MIN: CPT | Performed by: PHYSICIAN ASSISTANT

## 2025-02-04 PROCEDURE — G8427 DOCREV CUR MEDS BY ELIG CLIN: HCPCS | Performed by: PHYSICIAN ASSISTANT

## 2025-02-04 PROCEDURE — 3017F COLORECTAL CA SCREEN DOC REV: CPT | Performed by: PHYSICIAN ASSISTANT

## 2025-02-04 RX ORDER — OSELTAMIVIR PHOSPHATE 30 MG/1
CAPSULE ORAL
COMMUNITY
Start: 2025-01-16

## 2025-02-04 NOTE — PROGRESS NOTES
Name: Shlomo Rudolph Jr.  YOB: 1955  Gender: male  MRN: [id number]    CC: Back Pain (Low back pain)       HPI: This is a 69 y.o. year old male who history of a see 4 5 fusion with allograft bone graft in 1998 by Dr. Martinez due to a crushed vertebra which sounds like he had cervical myelopathy.  Patient does report he has had chronic nerve damage and problems walking since his surgery however the symptoms are worse.  He is also had bilateral hip replacements.  He feels that his right hip is never been the same after the hip replacement.  He presents with low back across his waistline and right anterior leg pain.  The pain is described as an aching pain.  He states his leg goes numb when laying in bed. His gait is severely altered.  He has history of two hip replacements.       This patient  has not had lumbar surgery in the past.     Patient is a poor historian.  It is difficult to get a good idea of how long he has had problems with his ambulation he does report he has been using a walker for 4 years but his pain has worsened.  He does get cramps in his upper body at times.            2/4/2025    11:46 AM   AMB PAIN ASSESSMENT   Location of Pain Back   Location Modifiers Posterior   Severity of Pain 8   Quality of Pain Aching   Duration of Pain Persistent   Frequency of Pain Constant   Limiting Behavior Some   Result of Injury No   Work-Related Injury No   Are there other pain locations you wish to document? No              ROS/Meds/PSH/PMH/FH/SH: I personally reviewed the patient's collected intake data.  Below are the pertinents:    No Known Allergies      Current Outpatient Medications:     oseltamivir (TAMIFLU) 30 MG capsule, TAKE 1 CAPSULE BY MOUTH TWICE DAILY FOR 5 DAYS, Disp: , Rfl:     omeprazole (PRILOSEC) 40 MG delayed release capsule, Take 1 capsule by mouth every morning (before breakfast), Disp: 90 capsule, Rfl: 0    lisinopril (PRINIVIL;ZESTRIL) 20 MG tablet, Take 1 tablet by mouth

## 2025-02-13 ENCOUNTER — HOSPITAL ENCOUNTER (OUTPATIENT)
Dept: MRI IMAGING | Age: 70
Discharge: HOME | End: 2025-02-16
Payer: MEDICARE

## 2025-02-13 DIAGNOSIS — R26.89 MYELOPATHIC GAIT: ICD-10-CM

## 2025-02-13 DIAGNOSIS — Z98.1 STATUS POST CERVICAL ARTHRODESIS: ICD-10-CM

## 2025-02-13 DIAGNOSIS — M50.30 DDD (DEGENERATIVE DISC DISEASE), CERVICAL: ICD-10-CM

## 2025-02-13 DIAGNOSIS — M47.816 FACET ARTHROPATHY, LUMBAR: ICD-10-CM

## 2025-02-13 DIAGNOSIS — M54.50 LOW BACK PAIN, UNSPECIFIED BACK PAIN LATERALITY, UNSPECIFIED CHRONICITY, UNSPECIFIED WHETHER SCIATICA PRESENT: ICD-10-CM

## 2025-02-13 DIAGNOSIS — M48.061 DEGENERATIVE LUMBAR SPINAL STENOSIS: ICD-10-CM

## 2025-02-13 PROCEDURE — 72141 MRI NECK SPINE W/O DYE: CPT

## 2025-02-13 PROCEDURE — 72148 MRI LUMBAR SPINE W/O DYE: CPT

## 2025-02-18 ENCOUNTER — TELEPHONE (OUTPATIENT)
Age: 70
End: 2025-02-18

## 2025-02-18 DIAGNOSIS — M54.50 LOW BACK PAIN, UNSPECIFIED BACK PAIN LATERALITY, UNSPECIFIED CHRONICITY, UNSPECIFIED WHETHER SCIATICA PRESENT: Primary | ICD-10-CM

## 2025-02-18 DIAGNOSIS — M50.30 DDD (DEGENERATIVE DISC DISEASE), CERVICAL: ICD-10-CM

## 2025-02-18 DIAGNOSIS — R26.89 MYELOPATHIC GAIT: ICD-10-CM

## 2025-02-18 DIAGNOSIS — M47.816 FACET ARTHROPATHY, LUMBAR: ICD-10-CM

## 2025-02-18 DIAGNOSIS — M48.061 DEGENERATIVE LUMBAR SPINAL STENOSIS: ICD-10-CM

## 2025-02-18 DIAGNOSIS — Z98.1 STATUS POST CERVICAL ARTHRODESIS: ICD-10-CM

## 2025-02-18 NOTE — TELEPHONE ENCOUNTER
Left a message instructing to call back and schedule a follow up appointment with a spine surgeon.

## 2025-02-18 NOTE — TELEPHONE ENCOUNTER
----- Message from Anna Pacheco PA-C sent at 2/17/2025  2:38 PM EST -----  Needs follow up to review MRIs - with surgeon- I think we discussed getting him in with [facility] but I do not see appt was made.  ----- Message -----  From: Rasheeda Baer Incoming Orders Results To [facility]  Sent: 2/14/2025   7:41 AM EST  To: Anna Pacheco PA-C

## 2025-02-19 ENCOUNTER — OFFICE VISIT (OUTPATIENT)
Dept: INTERNAL MEDICINE CLINIC | Facility: CLINIC | Age: 70
End: 2025-02-19
Payer: MEDICARE

## 2025-02-19 VITALS
OXYGEN SATURATION: 97 % | HEIGHT: 72 IN | HEART RATE: 83 BPM | DIASTOLIC BLOOD PRESSURE: 73 MMHG | WEIGHT: 174.5 LBS | SYSTOLIC BLOOD PRESSURE: 131 MMHG | BODY MASS INDEX: 23.63 KG/M2 | TEMPERATURE: 97.6 F

## 2025-02-19 DIAGNOSIS — M47.816 LUMBAR FACET ARTHROPATHY: ICD-10-CM

## 2025-02-19 DIAGNOSIS — I10 PRIMARY HYPERTENSION: Primary | ICD-10-CM

## 2025-02-19 DIAGNOSIS — I10 PRIMARY HYPERTENSION: ICD-10-CM

## 2025-02-19 DIAGNOSIS — F17.210 CONTINUOUS DEPENDENCE ON CIGARETTE SMOKING: ICD-10-CM

## 2025-02-19 DIAGNOSIS — K21.9 GASTROESOPHAGEAL REFLUX DISEASE, UNSPECIFIED WHETHER ESOPHAGITIS PRESENT: ICD-10-CM

## 2025-02-19 DIAGNOSIS — R73.03 PREDIABETES: ICD-10-CM

## 2025-02-19 DIAGNOSIS — E78.00 PURE HYPERCHOLESTEROLEMIA: ICD-10-CM

## 2025-02-19 DIAGNOSIS — E55.9 VITAMIN D DEFICIENCY: ICD-10-CM

## 2025-02-19 DIAGNOSIS — Z96.641 HX OF TOTAL HIP ARTHROPLASTY, RIGHT: ICD-10-CM

## 2025-02-19 LAB
ALBUMIN SERPL-MCNC: 3.8 G/DL (ref 3.2–4.6)
ALBUMIN/GLOB SERPL: 1 (ref 1–1.9)
ALP SERPL-CCNC: 75 U/L (ref 40–129)
ALT SERPL-CCNC: 17 U/L (ref 8–55)
ANION GAP SERPL CALC-SCNC: 12 MMOL/L (ref 7–16)
AST SERPL-CCNC: 26 U/L (ref 15–37)
BASOPHILS # BLD: 0.05 K/UL (ref 0–0.2)
BASOPHILS NFR BLD: 0.7 % (ref 0–2)
BILIRUB SERPL-MCNC: 0.4 MG/DL (ref 0–1.2)
BUN SERPL-MCNC: 15 MG/DL (ref 8–23)
CALCIUM SERPL-MCNC: 9.5 MG/DL (ref 8.8–10.2)
CHLORIDE SERPL-SCNC: 107 MMOL/L (ref 98–107)
CHOLEST SERPL-MCNC: 179 MG/DL (ref 0–200)
CO2 SERPL-SCNC: 21 MMOL/L (ref 20–29)
CREAT SERPL-MCNC: 1.14 MG/DL (ref 0.8–1.3)
DIFFERENTIAL METHOD BLD: NORMAL
EOSINOPHIL # BLD: 0.17 K/UL (ref 0–0.8)
EOSINOPHIL NFR BLD: 2.4 % (ref 0.5–7.8)
ERYTHROCYTE [DISTWIDTH] IN BLOOD BY AUTOMATED COUNT: 13.6 % (ref 11.9–14.6)
EST. AVERAGE GLUCOSE BLD GHB EST-MCNC: 125 MG/DL
GLOBULIN SER CALC-MCNC: 4 G/DL (ref 2.3–3.5)
GLUCOSE SERPL-MCNC: 103 MG/DL (ref 70–99)
HBA1C MFR BLD: 6 % (ref 0–5.6)
HCT VFR BLD AUTO: 48.9 % (ref 41.1–50.3)
HDLC SERPL-MCNC: 57 MG/DL (ref 40–60)
HDLC SERPL: 3.1 (ref 0–5)
HGB BLD-MCNC: 15.4 G/DL (ref 13.6–17.2)
IMM GRANULOCYTES # BLD AUTO: 0.02 K/UL (ref 0–0.5)
IMM GRANULOCYTES NFR BLD AUTO: 0.3 % (ref 0–5)
LDLC SERPL CALC-MCNC: 111 MG/DL (ref 0–100)
LYMPHOCYTES # BLD: 1.82 K/UL (ref 0.5–4.6)
LYMPHOCYTES NFR BLD: 26.1 % (ref 13–44)
MCH RBC QN AUTO: 28.4 PG (ref 26.1–32.9)
MCHC RBC AUTO-ENTMCNC: 31.5 G/DL (ref 31.4–35)
MCV RBC AUTO: 90.2 FL (ref 82–102)
MONOCYTES # BLD: 0.83 K/UL (ref 0.1–1.3)
MONOCYTES NFR BLD: 11.9 % (ref 4–12)
NEUTS SEG # BLD: 4.07 K/UL (ref 1.7–8.2)
NEUTS SEG NFR BLD: 58.6 % (ref 43–78)
NRBC # BLD: 0 K/UL (ref 0–0.2)
PLATELET # BLD AUTO: 289 K/UL (ref 150–450)
PMV BLD AUTO: 10.7 FL (ref 9.4–12.3)
POTASSIUM SERPL-SCNC: 4.2 MMOL/L (ref 3.5–5.1)
PROT SERPL-MCNC: 7.9 G/DL (ref 6.3–8.2)
RBC # BLD AUTO: 5.42 M/UL (ref 4.23–5.6)
SODIUM SERPL-SCNC: 141 MMOL/L (ref 136–145)
TRIGL SERPL-MCNC: 55 MG/DL (ref 0–150)
TSH, 3RD GENERATION: 1.04 UIU/ML (ref 0.27–4.2)
VLDLC SERPL CALC-MCNC: 11 MG/DL (ref 6–23)
WBC # BLD AUTO: 7 K/UL (ref 4.3–11.1)

## 2025-02-19 PROCEDURE — 99214 OFFICE O/P EST MOD 30 MIN: CPT | Performed by: NURSE PRACTITIONER

## 2025-02-19 PROCEDURE — 3075F SYST BP GE 130 - 139MM HG: CPT | Performed by: NURSE PRACTITIONER

## 2025-02-19 PROCEDURE — 1159F MED LIST DOCD IN RCRD: CPT | Performed by: NURSE PRACTITIONER

## 2025-02-19 PROCEDURE — 1160F RVW MEDS BY RX/DR IN RCRD: CPT | Performed by: NURSE PRACTITIONER

## 2025-02-19 PROCEDURE — 4004F PT TOBACCO SCREEN RCVD TLK: CPT | Performed by: NURSE PRACTITIONER

## 2025-02-19 PROCEDURE — 3017F COLORECTAL CA SCREEN DOC REV: CPT | Performed by: NURSE PRACTITIONER

## 2025-02-19 PROCEDURE — G8427 DOCREV CUR MEDS BY ELIG CLIN: HCPCS | Performed by: NURSE PRACTITIONER

## 2025-02-19 PROCEDURE — G8420 CALC BMI NORM PARAMETERS: HCPCS | Performed by: NURSE PRACTITIONER

## 2025-02-19 PROCEDURE — G2211 COMPLEX E/M VISIT ADD ON: HCPCS | Performed by: NURSE PRACTITIONER

## 2025-02-19 PROCEDURE — 3078F DIAST BP <80 MM HG: CPT | Performed by: NURSE PRACTITIONER

## 2025-02-19 PROCEDURE — 1123F ACP DISCUSS/DSCN MKR DOCD: CPT | Performed by: NURSE PRACTITIONER

## 2025-02-19 RX ORDER — OMEPRAZOLE 40 MG/1
40 CAPSULE, DELAYED RELEASE ORAL
Qty: 90 CAPSULE | Refills: 1 | Status: SHIPPED | OUTPATIENT
Start: 2025-02-19

## 2025-02-19 RX ORDER — OMEPRAZOLE 40 MG/1
40 CAPSULE, DELAYED RELEASE ORAL
Qty: 90 CAPSULE | Refills: 0 | Status: SHIPPED | OUTPATIENT
Start: 2025-02-19 | End: 2025-02-19

## 2025-02-19 RX ORDER — LISINOPRIL 20 MG/1
20 TABLET ORAL DAILY
Qty: 90 TABLET | Refills: 0 | Status: SHIPPED | OUTPATIENT
Start: 2025-02-19 | End: 2025-02-19

## 2025-02-19 RX ORDER — LISINOPRIL 20 MG/1
20 TABLET ORAL DAILY
Qty: 90 TABLET | Refills: 1 | Status: SHIPPED | OUTPATIENT
Start: 2025-02-19

## 2025-02-19 SDOH — ECONOMIC STABILITY: FOOD INSECURITY: WITHIN THE PAST 12 MONTHS, THE FOOD YOU BOUGHT JUST DIDN'T LAST AND YOU DIDN'T HAVE MONEY TO GET MORE.: NEVER TRUE

## 2025-02-19 SDOH — ECONOMIC STABILITY: FOOD INSECURITY: WITHIN THE PAST 12 MONTHS, YOU WORRIED THAT YOUR FOOD WOULD RUN OUT BEFORE YOU GOT MONEY TO BUY MORE.: NEVER TRUE

## 2025-02-19 ASSESSMENT — ENCOUNTER SYMPTOMS
ABDOMINAL PAIN: 0
EYE PAIN: 0
CONSTIPATION: 0
DIARRHEA: 0
RHINORRHEA: 0
SORE THROAT: 0
SHORTNESS OF BREATH: 0
SINUS PAIN: 0
COUGH: 0
BACK PAIN: 1
VOMITING: 0
NAUSEA: 0

## 2025-02-19 ASSESSMENT — PATIENT HEALTH QUESTIONNAIRE - PHQ9
1. LITTLE INTEREST OR PLEASURE IN DOING THINGS: NOT AT ALL
2. FEELING DOWN, DEPRESSED OR HOPELESS: NOT AT ALL
SUM OF ALL RESPONSES TO PHQ QUESTIONS 1-9: 0
SUM OF ALL RESPONSES TO PHQ9 QUESTIONS 1 & 2: 0
SUM OF ALL RESPONSES TO PHQ QUESTIONS 1-9: 0

## 2025-02-19 NOTE — PROGRESS NOTES
cholesterol diet. Advised to avoid processed foods such as fast foods, canned foods. Advoid/limit sweet foods or drinks. Advised to read food labels. Advised to limit stress or find ways to reduce stress. Advised on physical activity or exercise 3-5 days a week, for at least 30 minutes, as tolerated. Advised to take medications as prescribed, report any side effects/intolerance or issues with medication/s such as insurance coverage. Reviewed cardiovascular risks and complication prevention. Advised to seek immediate medical attention (call 911 or present to Emergency Dept) for any chest pains, palpitations or shortness of breath. Advised on falls precautions.    Cont to fu with orthopedic.                Follow Up  Return in about 26 weeks (around 8/20/2025), or if symptoms worsen or fail to improve, for ROV with fasting labs.             Mariam Coppola DNP, FNP-BC

## 2025-02-19 NOTE — PATIENT INSTRUCTIONS
Welcome our practice and to our [facility] family.  Thank you for choosing us as your health care provider.  In the coming days, you may receive a survey about your visit via text or email.  Please take a few minutes to answer these questions.   As our patient, we value you opinion and appreciate your feedback   about your [facility] experience.      Thank you    [facility]  [phone]  IGG       Please arrive 20 minutes prior to your appointment time to allow time for checking in at the  and rooming with the medical assistant. Please bring your medication bottles at each visit.

## 2025-02-24 ENCOUNTER — OFFICE VISIT (OUTPATIENT)
Age: 70
End: 2025-02-24
Payer: MEDICARE

## 2025-02-24 DIAGNOSIS — M47.816 LUMBAR SPONDYLOSIS: ICD-10-CM

## 2025-02-24 DIAGNOSIS — Z96.641 STATUS POST TOTAL HIP REPLACEMENT, RIGHT: ICD-10-CM

## 2025-02-24 DIAGNOSIS — M25.551 RIGHT HIP PAIN: Primary | ICD-10-CM

## 2025-02-24 PROCEDURE — 99204 OFFICE O/P NEW MOD 45 MIN: CPT | Performed by: ORTHOPAEDIC SURGERY

## 2025-02-24 PROCEDURE — 4004F PT TOBACCO SCREEN RCVD TLK: CPT | Performed by: ORTHOPAEDIC SURGERY

## 2025-02-24 PROCEDURE — G8420 CALC BMI NORM PARAMETERS: HCPCS | Performed by: ORTHOPAEDIC SURGERY

## 2025-02-24 PROCEDURE — 1159F MED LIST DOCD IN RCRD: CPT | Performed by: ORTHOPAEDIC SURGERY

## 2025-02-24 PROCEDURE — G8427 DOCREV CUR MEDS BY ELIG CLIN: HCPCS | Performed by: ORTHOPAEDIC SURGERY

## 2025-02-24 PROCEDURE — 1123F ACP DISCUSS/DSCN MKR DOCD: CPT | Performed by: ORTHOPAEDIC SURGERY

## 2025-02-24 PROCEDURE — 3017F COLORECTAL CA SCREEN DOC REV: CPT | Performed by: ORTHOPAEDIC SURGERY

## 2025-02-24 NOTE — PROGRESS NOTES
Name: Shlomo Rudolph Jr.  YOB: 1955  Gender: male  MRN: [id number]  Age: 69 y.o.    Chief Complaint:   Chief Complaint   Patient presents with    Back Pain    New Patient         History of Present Illness:      This is a very pleasant 69 y.o. male who presents with a chronic history of balance and coordination issues.  The patient has an extensive orthopedic and spine history with ACDF in 1998 as well as bilateral hip replacements.  His right hip was replaced in 2009 and he feels that his symptoms involving the right hip have never been normal after that.  He has follow-up with his surgeon several times but his issues persist.  He reports pain in his right buttock and groin and feels like his right leg hits his left leg when walking.  He has previously used a cane but because of how often his legs interact/interfere with each other he has been using a rolling walker.  He has not tried any extensive therapy.  He denies issues with bladder incontinence.  He denies any fine motor dexterity issues.          Social History:  Social History     Tobacco Use    Smoking status: Every Day     Current packs/day: 1.00     Average packs/day: 1 pack/day for 20.0 years (20.0 ttl pk-yrs)     Types: Cigarettes     Passive exposure: Never    Smokeless tobacco: Never   Substance Use Topics    Alcohol use: Not Currently     Alcohol/week: 4.0 standard drinks of alcohol     Types: 4 Drinks containing 0.5 oz of alcohol per week     Comment: 4 mini botles last night        What is your occupation? retired    Are you disabled? no    Who do you live with? Alone    Who referred you here?   Anna Pacheco, PAMpC      Medications:  Prior to Visit Medications    Medication Sig Taking? Authorizing Provider   omeprazole (PRILOSEC) 40 MG delayed release capsule Take 1 capsule by mouth every morning (before breakfast)  Mariam Coppola, APRN - CNP   lisinopril (PRINIVIL;ZESTRIL) 20 MG tablet Take 1 tablet by mouth daily  Mariam Coppola,

## 2025-03-24 ENCOUNTER — OFFICE VISIT (OUTPATIENT)
Dept: ORTHOPEDIC SURGERY | Age: 70
End: 2025-03-24
Payer: MEDICARE

## 2025-03-24 DIAGNOSIS — Z96.641 STATUS POST TOTAL HIP REPLACEMENT, RIGHT: Primary | ICD-10-CM

## 2025-03-24 PROCEDURE — G8420 CALC BMI NORM PARAMETERS: HCPCS | Performed by: PHYSICIAN ASSISTANT

## 2025-03-24 PROCEDURE — 99214 OFFICE O/P EST MOD 30 MIN: CPT | Performed by: PHYSICIAN ASSISTANT

## 2025-03-24 PROCEDURE — 1123F ACP DISCUSS/DSCN MKR DOCD: CPT | Performed by: PHYSICIAN ASSISTANT

## 2025-03-24 PROCEDURE — 3017F COLORECTAL CA SCREEN DOC REV: CPT | Performed by: PHYSICIAN ASSISTANT

## 2025-03-24 PROCEDURE — G8428 CUR MEDS NOT DOCUMENT: HCPCS | Performed by: PHYSICIAN ASSISTANT

## 2025-03-24 PROCEDURE — 4004F PT TOBACCO SCREEN RCVD TLK: CPT | Performed by: PHYSICIAN ASSISTANT

## 2025-03-24 NOTE — PROGRESS NOTES
Name: Shlomo Rudolph Jr.  YOB: 1955  Gender: male  MRN: [id number]    CC:   Chief Complaint   Patient presents with    Hip Pain     Right CAROLYN recheck -getting xrays today  Sx 2017         HPI:   The patient is 11 years status post right CAROLYN who presents today complaining of right hip pain and weakness has been present for several years.  He reports that his gait has never really been the same since having hip surgery; however, he did have significantly altered gait since having cervical spine surgery in 1998.  He localizes pain to the right side low back and buttock region and denies any pain in the right groin or anterior thigh today.  It does not hurt at night when sleeping.  There was not an acute injury to the hip.  It hurts to walk and pain worsens with increased distance.  The pain does not radiate down the leg.  Numbness and tingling are not noted.  The patient is not really having difficulty putting socks and shoes on.        Treatment so far has been anti-inflammatory medications.  He is being followed by the spine team for his cervical and lumbar concerns and was referred for PT by Dr. Webster but has not started yet.    He had left M2M CAROLYN in 2009 which is doing well.      Review of Systems  As per HPI.  Pertinent positives and negatives are addressed with the patient, particularly those related to musculoskeletal concerns.  Non-orthopaedic concerns were referred back to the primary care physician.      PMFSH:    Current Outpatient Medications:     omeprazole (PRILOSEC) 40 MG delayed release capsule, Take 1 capsule by mouth every morning (before breakfast), Disp: 90 capsule, Rfl: 1    lisinopril (PRINIVIL;ZESTRIL) 20 MG tablet, Take 1 tablet by mouth daily, Disp: 90 tablet, Rfl: 1    Vitamin D3 125 MCG (5000 UT) TABS tablet, Take 1 tablet by mouth daily 1 tablet daily, Disp: 90 tablet, Rfl: 1    Blood Pressure KIT, 1 each by Does not apply route daily (Patient not taking: Reported on

## 2025-04-24 ENCOUNTER — OFFICE VISIT (OUTPATIENT)
Age: 70
End: 2025-04-24
Payer: MEDICARE

## 2025-04-24 DIAGNOSIS — M47.816 FACET ARTHROPATHY, LUMBAR: ICD-10-CM

## 2025-04-24 DIAGNOSIS — M50.30 DDD (DEGENERATIVE DISC DISEASE), CERVICAL: Primary | ICD-10-CM

## 2025-04-24 DIAGNOSIS — R26.89 MYELOPATHIC GAIT: ICD-10-CM

## 2025-04-24 DIAGNOSIS — M47.816 LUMBAR SPONDYLOSIS: ICD-10-CM

## 2025-04-24 PROCEDURE — G8427 DOCREV CUR MEDS BY ELIG CLIN: HCPCS | Performed by: ORTHOPAEDIC SURGERY

## 2025-04-24 PROCEDURE — 3017F COLORECTAL CA SCREEN DOC REV: CPT | Performed by: ORTHOPAEDIC SURGERY

## 2025-04-24 PROCEDURE — 99214 OFFICE O/P EST MOD 30 MIN: CPT | Performed by: ORTHOPAEDIC SURGERY

## 2025-04-24 PROCEDURE — 4004F PT TOBACCO SCREEN RCVD TLK: CPT | Performed by: ORTHOPAEDIC SURGERY

## 2025-04-24 PROCEDURE — G8420 CALC BMI NORM PARAMETERS: HCPCS | Performed by: ORTHOPAEDIC SURGERY

## 2025-04-24 PROCEDURE — 1159F MED LIST DOCD IN RCRD: CPT | Performed by: ORTHOPAEDIC SURGERY

## 2025-04-24 PROCEDURE — 1123F ACP DISCUSS/DSCN MKR DOCD: CPT | Performed by: ORTHOPAEDIC SURGERY

## 2025-04-25 NOTE — PROGRESS NOTES
Name: Shlomo Rudolph Jr.  YOB: 1955  Gender: male  MRN: [id number]  Age: 69 y.o.    Chief Complaint: Leg issues    Assessment/Plan at Previous Visit: Physical therapy    History of Present Illness  The patient is a 69-year-old gentleman presenting for evaluation of balance issues. He was last seen on 02/24/2025. He has a fairly complex orthopedic history with a self-reported ACDF in 1998 for balance issues, as well as bilateral hip replacements with the right hip being replaced in 2009 contributing to persistent issues with gait. At the last visit, he felt his gait issues were more mechanical, noting an inward position of the right hip and an inward foot progression angle where his legs incidentally touch each other when walking. He uses a rolling walker for mobilization. At the last visit, he did not complain of any fine motor dexterity issues. He was noted to have severe central canal stenosis at C3-4. He was referred to physical therapy at the last appointment but was unable to do this.    His mobility has been inconsistent, with periods of both improvement and deterioration. He experiences weakness in his legs. Prolonged sitting results in numbness in his leg. He reports a decline in his balance compared to 2 months ago and feels weaker, experiencing fatigue during ambulation. He has transitioned from using a cane to a walker for better balance. He is not currently on any anticoagulant therapy. He reports no history of stroke.    He also reports shoulder discomfort and arm soreness. Despite these issues, he does not experience any weakness in his arms or difficulty with fine motor tasks such as buttoning small buttons. His handwriting remains satisfactory.    PAST SURGICAL HISTORY: ACDF in 1998, bilateral hip replacements with the right hip replaced in 2009.                     Medications:       Current Outpatient Medications:     omeprazole (PRILOSEC) 40 MG delayed release capsule, Take 1

## 2025-04-29 ENCOUNTER — CLINICAL DOCUMENTATION (OUTPATIENT)
Age: 70
End: 2025-04-29

## 2025-04-29 NOTE — PROGRESS NOTES
Chart reviewed.  MRI scheduled for 05/01/2025.  Follow up scheduled with Dr. Webster 05/05/2025.  Will follow up with patient in person or over telephone after MRI.

## 2025-05-02 NOTE — PROGRESS NOTES
Chart reviewed.  MRI scheduled completed  Follow up scheduled with Dr. Webster 05/05/2025.  Will follow up with patient in person or over telephone after MRI if needed.

## 2025-05-05 ENCOUNTER — OFFICE VISIT (OUTPATIENT)
Age: 70
End: 2025-05-05
Payer: MEDICARE

## 2025-05-05 DIAGNOSIS — M50.30 DDD (DEGENERATIVE DISC DISEASE), CERVICAL: Primary | ICD-10-CM

## 2025-05-05 DIAGNOSIS — G95.9 CERVICAL MYELOPATHY (HCC): ICD-10-CM

## 2025-05-05 DIAGNOSIS — R26.89 MYELOPATHIC GAIT: ICD-10-CM

## 2025-05-05 PROCEDURE — 4004F PT TOBACCO SCREEN RCVD TLK: CPT | Performed by: ORTHOPAEDIC SURGERY

## 2025-05-05 PROCEDURE — 1159F MED LIST DOCD IN RCRD: CPT | Performed by: ORTHOPAEDIC SURGERY

## 2025-05-05 PROCEDURE — G8420 CALC BMI NORM PARAMETERS: HCPCS | Performed by: ORTHOPAEDIC SURGERY

## 2025-05-05 PROCEDURE — 3017F COLORECTAL CA SCREEN DOC REV: CPT | Performed by: ORTHOPAEDIC SURGERY

## 2025-05-05 PROCEDURE — 99215 OFFICE O/P EST HI 40 MIN: CPT | Performed by: ORTHOPAEDIC SURGERY

## 2025-05-05 PROCEDURE — G8427 DOCREV CUR MEDS BY ELIG CLIN: HCPCS | Performed by: ORTHOPAEDIC SURGERY

## 2025-05-05 PROCEDURE — 1123F ACP DISCUSS/DSCN MKR DOCD: CPT | Performed by: ORTHOPAEDIC SURGERY

## 2025-05-07 NOTE — H&P (VIEW-ONLY)
Name: Shlomo Rudolph Jr.  YOB: 1955  Gender: male  MRN: [id number]  Age: 70 y.o.    Chief Complaint: balance issues related to cervical stenosis     Assessment/Plan at Previous Visit: update imaging with MRI C/T spine    History of Present Illness  The patient is a 69-year-old male who presents today for a follow-up and review of MRIs of the cervical and thoracic spine. He was last seen on 04/24/2025 after originally being seen on 02/24/2025.    In the interim between those two appointments, he developed more severe balance and gait issues, as well as clonus in the bilateral ankles and hyperreflexia noted in the patellar tendon reflexes bilaterally. He has known C3-4 severe stenosis, which is adjacent and superior to a previous non-instrumented C4-5 ACDF done in 1998. He has had no upper motor neuron signs in his upper extremities, such as hyperreflexia, no Love, or difficulty with hand usage or fine motor dexterity. However, at the last appointment, his balance issues had progressed substantially after originally being thought to be secondary to an inward right foot progression angle following a previous right total hip replacement. MRIs of the cervical and thoracic spine were obtained to rule out any additional lesions, particularly in the thoracic spine, that would be causing the lower extremity issues.    He reports a decline in his overall health status, with a significant decrease in leg function. He has been experiencing progressive weakness in his legs over the past month. Despite living independently, he continues to drive. He does mention a little bit of subjective weakness in his LUE with intermittent tingling today.     PAST SURGICAL HISTORY:  Non-instrumented C4-5 ACDF in 1998  Right total hip replacement (date not specified)    SOCIAL HISTORY  Tobacco: Smokes a pack a day.               Medications:       Current Outpatient Medications:     omeprazole (PRILOSEC) 40 MG delayed

## 2025-05-07 NOTE — PROGRESS NOTES
Name: Shlomo Rudolph Jr.  YOB: 1955  Gender: male  MRN: [id number]  Age: 70 y.o.    Chief Complaint: balance issues related to cervical stenosis     Assessment/Plan at Previous Visit: update imaging with MRI C/T spine    History of Present Illness  The patient is a 69-year-old male who presents today for a follow-up and review of MRIs of the cervical and thoracic spine. He was last seen on 04/24/2025 after originally being seen on 02/24/2025.    In the interim between those two appointments, he developed more severe balance and gait issues, as well as clonus in the bilateral ankles and hyperreflexia noted in the patellar tendon reflexes bilaterally. He has known C3-4 severe stenosis, which is adjacent and superior to a previous non-instrumented C4-5 ACDF done in 1998. He has had no upper motor neuron signs in his upper extremities, such as hyperreflexia, no Love, or difficulty with hand usage or fine motor dexterity. However, at the last appointment, his balance issues had progressed substantially after originally being thought to be secondary to an inward right foot progression angle following a previous right total hip replacement. MRIs of the cervical and thoracic spine were obtained to rule out any additional lesions, particularly in the thoracic spine, that would be causing the lower extremity issues.    He reports a decline in his overall health status, with a significant decrease in leg function. He has been experiencing progressive weakness in his legs over the past month. Despite living independently, he continues to drive. He does mention a little bit of subjective weakness in his LUE with intermittent tingling today.     PAST SURGICAL HISTORY:  Non-instrumented C4-5 ACDF in 1998  Right total hip replacement (date not specified)    SOCIAL HISTORY  Tobacco: Smokes a pack a day.               Medications:       Current Outpatient Medications:     omeprazole (PRILOSEC) 40 MG delayed

## 2025-05-08 ENCOUNTER — TELEPHONE (OUTPATIENT)
Dept: SURGERY | Age: 70
End: 2025-05-08

## 2025-05-08 ENCOUNTER — HOSPITAL ENCOUNTER (OUTPATIENT)
Dept: SURGERY | Age: 70
Discharge: HOME | End: 2025-05-11
Payer: MEDICARE

## 2025-05-08 VITALS
RESPIRATION RATE: 18 BRPM | DIASTOLIC BLOOD PRESSURE: 90 MMHG | HEIGHT: 72 IN | HEART RATE: 75 BPM | SYSTOLIC BLOOD PRESSURE: 150 MMHG | BODY MASS INDEX: 23.28 KG/M2 | TEMPERATURE: 98.4 F | WEIGHT: 171.9 LBS

## 2025-05-08 LAB
ANION GAP SERPL CALC-SCNC: 13 MMOL/L (ref 7–16)
APPEARANCE UR: CLEAR
BACTERIA URNS QL MICRO: ABNORMAL /HPF
BILIRUB UR QL: NEGATIVE
BUN SERPL-MCNC: 13 MG/DL (ref 8–23)
CALCIUM SERPL-MCNC: 9.4 MG/DL (ref 8.8–10.2)
CHLORIDE SERPL-SCNC: 105 MMOL/L (ref 98–107)
CO2 SERPL-SCNC: 22 MMOL/L (ref 20–29)
COLOR UR: ABNORMAL
CREAT SERPL-MCNC: 1.02 MG/DL (ref 0.8–1.3)
EPI CELLS #/AREA URNS HPF: ABNORMAL /HPF
ERYTHROCYTE [DISTWIDTH] IN BLOOD BY AUTOMATED COUNT: 13 % (ref 11.9–14.6)
GLUCOSE SERPL-MCNC: 86 MG/DL (ref 70–99)
GLUCOSE UR STRIP.AUTO-MCNC: NEGATIVE MG/DL
HCT VFR BLD AUTO: 45.6 % (ref 41.1–50.3)
HGB BLD-MCNC: 14.8 G/DL (ref 13.6–17.2)
HGB UR QL STRIP: NEGATIVE
HYALINE CASTS URNS QL MICRO: ABNORMAL /LPF
INR PPP: 1
KETONES UR QL STRIP.AUTO: NEGATIVE MG/DL
LEUKOCYTE ESTERASE UR QL STRIP.AUTO: ABNORMAL
MCH RBC QN AUTO: 28.8 PG (ref 26.1–32.9)
MCHC RBC AUTO-ENTMCNC: 32.5 G/DL (ref 31.4–35)
MCV RBC AUTO: 88.7 FL (ref 82–102)
MRSA DNA SPEC QL NAA+PROBE: NOT DETECTED
NITRITE UR QL STRIP.AUTO: NEGATIVE
NRBC # BLD: 0 K/UL (ref 0–0.2)
PH UR STRIP: 5 (ref 5–9)
PLATELET # BLD AUTO: 291 K/UL (ref 150–450)
PMV BLD AUTO: 10.3 FL (ref 9.4–12.3)
POTASSIUM SERPL-SCNC: 3.7 MMOL/L (ref 3.5–5.1)
PROT UR STRIP-MCNC: NEGATIVE MG/DL
PROTHROMBIN TIME: 13.3 SEC (ref 11.3–14.9)
RBC # BLD AUTO: 5.14 M/UL (ref 4.23–5.6)
RBC #/AREA URNS HPF: ABNORMAL /HPF
S AUREUS CPE NOSE QL NAA+PROBE: NOT DETECTED
SODIUM SERPL-SCNC: 140 MMOL/L (ref 136–145)
SP GR UR REFRACTOMETRY: 1.02 (ref 1–1.02)
UROBILINOGEN UR QL STRIP.AUTO: 0.2 EU/DL (ref 0.2–1)
WBC # BLD AUTO: 5.4 K/UL (ref 4.3–11.1)
WBC URNS QL MICRO: ABNORMAL /HPF

## 2025-05-08 PROCEDURE — 80048 BASIC METABOLIC PNL TOTAL CA: CPT

## 2025-05-08 PROCEDURE — 81001 URINALYSIS AUTO W/SCOPE: CPT

## 2025-05-08 PROCEDURE — 85610 PROTHROMBIN TIME: CPT

## 2025-05-08 PROCEDURE — 85027 COMPLETE CBC AUTOMATED: CPT

## 2025-05-08 PROCEDURE — 87641 MR-STAPH DNA AMP PROBE: CPT

## 2025-05-08 NOTE — TELEPHONE ENCOUNTER
Please review UA    Latest Reference Range & Units 05/08/25 13:13   Color, UA -   YELLOW/STRAW   Glucose, Ur NEG mg/dL Negative   Bilirubin, Urine NEG   Negative   Ketones, Urine NEG mg/dL Negative   Specific Gravity, UA 1.001 - 1.023   1.021   Blood, Urine NEG   Negative   Protein, UA NEG mg/dL Negative   Urobilinogen 0.2 - 1.0 EU/dL 0.2   Nitrite, Urine NEG   Negative   Leukocyte Esterase, Urine NEG   SMALL !   Appearance -   CLEAR   pH, Urine 5.0 - 9.0   5.0   Hyaline Casts, UA /lpf 0-2   WBC, UA U4 /hpf  !   RBC, UA U5 /hpf 0-5   Epithelial Cells, UA U5 /hpf 0-5   Bacteria, UA NEG /hpf 1+ !   !: Data is abnormal

## 2025-05-08 NOTE — PROGRESS NOTES
PLEASE CONTINUE TAKING ALL PRESCRIPTION MEDICATIONS UP TO THE DAY OF SURGERY UNLESS OTHERWISE DIRECTED BELOW. You may take Tylenol, allergy,  and/or indigestion medications.     TAKE ONLY THESE MEDICATIONS ON THE DAY OF SURGERY   Omeprazole            DISCONTINUE all vitamins and supplements 7 days prior to surgery. DISCONTINUE Non-Steroidal Anti-Inflammatory (NSAIDS) such as Advil and Aleve 5 days prior to surgery.     Home Medications to Hold- please continue all other medications except these.    Vitamin D   No Lisinopril Morning of Surgery ONLY         Comments      On the day before surgery please take 2 Tylenol in the morning and then again before bed. You may use either regular or extra strength.           Please do not bring home medications with you on the day of surgery unless otherwise directed by your nurse.  If you are instructed to bring home medications, please give them to your nurse as they will be administered by the nursing staff.    If you have any questions, please call [facility] [phone].    A copy of this note was provided to the patient for reference.

## 2025-05-08 NOTE — PROGRESS NOTES
Patient verified name, , and surgery as listed in [facility]. Patient provided medical/health information and PTA medications to the best of their ability.    TYPE  CASE: 3  Orders per surgeon: none received  Labs per surgeon: cbc,bmp,MRSA,UA, PT, . Results: pending  Labs per anesthesia protocol: cbc,bmp. T/S  Results pending  EKG:  not needed at time of PAT , no HX CVA, DM, PVD, CAD     MRSA/MSSA swab collected; pharmacy to review and dose antibiotic as appropriate.     Patient provided with and instructed on education handouts including Guide to Surgery,  Preventing Infections, Pain Management, and [facility] brochure.     Road to Recovery Spine surgery patient guide given. Instructed on incentive spirometry with return demonstration. Patient viewed spine prehab video.     Surgical skin cleanser and instructions given per hospital policy.    Original medication prescription bottles not visualized during patient appointment.     Patient teach back successful and patient demonstrates knowledge of instruction.

## 2025-05-08 NOTE — PROGRESS NOTES
Telephone Encounter sent to Kathleen Watkins at Dr Webster's office with UA results . CBC,BMP results WNL .

## 2025-05-08 NOTE — PROGRESS NOTES
Preoperative Nutrition Screen (ESTELLE)   Patient's Age: 70 y.o.    Last Serum Albumin Level:3.8  Lab Results   Component Value Date    ALBUMIN 3.8 02/19/2025    ALBUMIN 3.7 12/27/2024    ALBUMIN 3.6 11/21/2024       Patient's BMI: Estimated body mass index is 23.67 kg/m² as calculated from the following:    Height as of 2/19/25: 1.829 m (6').    Weight as of 2/19/25: 79.2 kg (174 lb 8 oz).     If the answer is yes (ESTELLE > 1) to any of the following, then recommend prescribed Oral Nutrition Supplements (ONS) for 5 days prior to surgery and in addition, if ESTELLE > 3, order referral to dietitian for further assessment and nutrition therapy.     1. Does the patient have a documented serum albumin less than 3.0 within the last 90 days?    no   2. Is patient's BMI less than 18.5 (or less than 20 if age over 65)?  No = 0         3. Has the patient had an unplanned weight loss of 10% of body weight or more in the last 6 months? No = 0   4. Has the patient been eating less than 50% of their normal diet in the preceding week? No = 0   ESTELLE Score (number of Yes responses), 0-4 0     Plan:   No nutrition intervention indicated    Electronically signed by Frances Fernández RN on 5/8/25 at 12:39 PM EDT

## 2025-05-08 NOTE — PROGRESS NOTES
Enhanced Recovery After Surgery for non-diabetic patients  ESTELLE score > 1  No. Give only Ensure Pre-surgery drinks with  instructions       The night before surgery 5/13/25, drink 2 bottles of the Ensure Pre-Surgery drink.         The morning of surgery 5/14/25, drink one bottle of the Ensure Pre-Surgery 2 hours prior to your hospital arrival. Drink this over 5-10 minutes. Drink nothing else after drinking the pre-surgical drink the morning of surgery.    Bring your patient handbook with you to the hospital.      Things to remember:    1. You will be up on a chair the evening of surgery and drinking clear liquids. Your diet will be advanced by your surgeon as appropriate.    2. Beginning the day after surgery, you will be up in a chair for all meals.    3. Beginning the day after surgery, you will be out of the bed for a minimum of 6 hours (not all at one time)    4. Beginning the day after surgery, you will walk in the olivia in the olivia at least 50' at least three times a day.    5. You will be given scheduled non-narcotic pain medication to help keep your pain under control.  You will have stronger pain medication ordered for break through pain.     6. All of these measures are geared toward returning your bowel to normal function as soon as possible and to prevent complications associated with bowel blockage, blood clots, and/or pneumonia.         Learning About Enhanced Recovery After Surgery  What is enhanced recovery after surgery?     Enhanced recovery after surgery, or ERAS, is a way to help people prepare for surgery. The goal is to help you get ready for surgery and get better sooner. Before your surgery, you:  Eat well in the days leading up to the surgery.  Drink lots of clear fluids.  Learn some basic information about your surgery. Doing this can put your mind at ease.  Your doctor will explain any other tips to help you recover. You may have to avoid some foods. You may also need to empty your  colon with a laxative or an enema.  During surgery, you may get different kinds of medicine for pain. This can help you heal sooner and feel less pain later.  Soon after surgery, you will also drink fluids, eat as soon as you can, and try to walk a little. Different types of pain medicine may be used after surgery. This can also reduce the need for opioids and reduce side effects like constipation.  These recovery tips may be recommended for many types of surgeries, such as:  Colorectal surgery.  Surgery on the veins, arteries, heart, lungs, or other organs.  Surgery to remove a growth (tumor).  Knowing what to expect before, during, and after surgery can help ease your fears. It can also help you take an active role in your recovery. Ask the doctor any questions you may have.  How do you prepare for surgery?  Eat healthy foods in the week leading up to your surgery. Your doctor will tell you how long before surgery to stop eating.  Try to get more exercise in the weeks leading up to your surgery. Even a little walking can help. The better shape you are in, the sooner you are likely to recover.  Drink plenty of liquids. Your doctor will tell you when you should stop drinking liquids.  Your doctor will also tell you if you should stop taking any medicines.  Do not smoke. Smoking can delay recovery. If you need help quitting, talk to your doctor about stop-smoking programs and medicines. These can increase your chances of quitting for good.  What can you expect during your surgery?  You will get medicine so that you relax and don't feel pain during your surgery. The surgical team will closely watch your fluid levels during the surgery to keep you well hydrated.  What can you expect after surgery?  You will drink water and eat healthy foods as soon as you can after surgery. Your care team will urge you to sit up in a chair while you eat.  The doctor or nurse will encourage you to get up and walk as soon as you can. The

## 2025-05-12 ENCOUNTER — TELEPHONE (OUTPATIENT)
Dept: ORTHOPEDIC SURGERY | Age: 70
End: 2025-05-12

## 2025-05-12 NOTE — TELEPHONE ENCOUNTER
Called pt to let him know he will have the ability to speak to speak to someone about care for himself about home care or a rehab facility before discharge at the hospital. I asked him not to worry about that until he's done his procedure. Pt stated he understood.

## 2025-05-12 NOTE — TELEPHONE ENCOUNTER
Patient is worried about going home after his surgery by  himself and wants to know if he can go to a nursing home instead?  He asks for a return call to discuss please.

## 2025-05-13 ENCOUNTER — ANESTHESIA EVENT (OUTPATIENT)
Dept: SURGERY | Age: 70
End: 2025-05-13
Payer: MEDICARE

## 2025-05-14 ENCOUNTER — HOSPITAL ENCOUNTER (INPATIENT)
Age: 70
LOS: 8 days | Discharge: NURSING, REHAB OR LONG-TERM CARE | End: 2025-05-22
Attending: ORTHOPAEDIC SURGERY | Admitting: ORTHOPAEDIC SURGERY
Payer: MEDICARE

## 2025-05-14 ENCOUNTER — ANESTHESIA (OUTPATIENT)
Dept: SURGERY | Age: 70
End: 2025-05-14
Payer: MEDICARE

## 2025-05-14 ENCOUNTER — APPOINTMENT (OUTPATIENT)
Dept: GENERAL RADIOLOGY | Age: 70
End: 2025-05-14
Attending: ORTHOPAEDIC SURGERY
Payer: MEDICARE

## 2025-05-14 DIAGNOSIS — R26.89 MYELOPATHIC GAIT: ICD-10-CM

## 2025-05-14 DIAGNOSIS — M50.30 DDD (DEGENERATIVE DISC DISEASE), CERVICAL: Primary | ICD-10-CM

## 2025-05-14 DIAGNOSIS — G95.9 CERVICAL MYELOPATHY (HCC): ICD-10-CM

## 2025-05-14 LAB
ABO + RH BLD: NORMAL
BLOOD GROUP ANTIBODIES SERPL: NORMAL
SPECIMEN EXP DATE BLD: NORMAL

## 2025-05-14 PROCEDURE — 63048 LAM FACETEC &FORAMOT EA ADDL: CPT | Performed by: ORTHOPAEDIC SURGERY

## 2025-05-14 PROCEDURE — 97530 THERAPEUTIC ACTIVITIES: CPT

## 2025-05-14 PROCEDURE — 6360000002 HC RX W HCPCS: Performed by: ORTHOPAEDIC SURGERY

## 2025-05-14 PROCEDURE — 2500000003 HC RX 250 WO HCPCS: Performed by: ORTHOPAEDIC SURGERY

## 2025-05-14 PROCEDURE — 20930 SP BONE ALGRFT MORSEL ADD-ON: CPT | Performed by: ORTHOPAEDIC SURGERY

## 2025-05-14 PROCEDURE — 7100000001 HC PACU RECOVERY - ADDTL 15 MIN: Performed by: ORTHOPAEDIC SURGERY

## 2025-05-14 PROCEDURE — 3600000014 HC SURGERY LEVEL 4 ADDTL 15MIN: Performed by: ORTHOPAEDIC SURGERY

## 2025-05-14 PROCEDURE — 22840 INSERT SPINE FIXATION DEVICE: CPT | Performed by: ORTHOPAEDIC SURGERY

## 2025-05-14 PROCEDURE — 86901 BLOOD TYPING SEROLOGIC RH(D): CPT

## 2025-05-14 PROCEDURE — 6360000002 HC RX W HCPCS: Performed by: NURSE PRACTITIONER

## 2025-05-14 PROCEDURE — 3600000004 HC SURGERY LEVEL 4 BASE: Performed by: ORTHOPAEDIC SURGERY

## 2025-05-14 PROCEDURE — 3700000000 HC ANESTHESIA ATTENDED CARE: Performed by: ORTHOPAEDIC SURGERY

## 2025-05-14 PROCEDURE — 2580000003 HC RX 258: Performed by: ANESTHESIOLOGY

## 2025-05-14 PROCEDURE — 3700000001 HC ADD 15 MINUTES (ANESTHESIA): Performed by: ORTHOPAEDIC SURGERY

## 2025-05-14 PROCEDURE — 2709999900 HC NON-CHARGEABLE SUPPLY: Performed by: ORTHOPAEDIC SURGERY

## 2025-05-14 PROCEDURE — 2580000003 HC RX 258: Performed by: ORTHOPAEDIC SURGERY

## 2025-05-14 PROCEDURE — C1713 ANCHOR/SCREW BN/BN,TIS/BN: HCPCS | Performed by: ORTHOPAEDIC SURGERY

## 2025-05-14 PROCEDURE — 2500000003 HC RX 250 WO HCPCS

## 2025-05-14 PROCEDURE — 6360000002 HC RX W HCPCS

## 2025-05-14 PROCEDURE — 6360000002 HC RX W HCPCS: Performed by: NURSE ANESTHETIST, CERTIFIED REGISTERED

## 2025-05-14 PROCEDURE — 97161 PT EVAL LOW COMPLEX 20 MIN: CPT

## 2025-05-14 PROCEDURE — 86900 BLOOD TYPING SEROLOGIC ABO: CPT

## 2025-05-14 PROCEDURE — 2580000003 HC RX 258

## 2025-05-14 PROCEDURE — 6370000000 HC RX 637 (ALT 250 FOR IP): Performed by: ANESTHESIOLOGY

## 2025-05-14 PROCEDURE — 1100000000 HC RM PRIVATE

## 2025-05-14 PROCEDURE — 2720000010 HC SURG SUPPLY STERILE: Performed by: ORTHOPAEDIC SURGERY

## 2025-05-14 PROCEDURE — 6370000000 HC RX 637 (ALT 250 FOR IP): Performed by: ORTHOPAEDIC SURGERY

## 2025-05-14 PROCEDURE — 00NW0ZZ RELEASE CERVICAL SPINAL CORD, OPEN APPROACH: ICD-10-PCS | Performed by: ORTHOPAEDIC SURGERY

## 2025-05-14 PROCEDURE — 86850 RBC ANTIBODY SCREEN: CPT

## 2025-05-14 PROCEDURE — C1889 IMPLANT/INSERT DEVICE, NOC: HCPCS | Performed by: ORTHOPAEDIC SURGERY

## 2025-05-14 PROCEDURE — 0RG10K1 FUSION OF CERVICAL VERTEBRAL JOINT WITH NONAUTOLOGOUS TISSUE SUBSTITUTE, POSTERIOR APPROACH, POSTERIOR COLUMN, OPEN APPROACH: ICD-10-PCS | Performed by: ORTHOPAEDIC SURGERY

## 2025-05-14 PROCEDURE — 7100000000 HC PACU RECOVERY - FIRST 15 MIN: Performed by: ORTHOPAEDIC SURGERY

## 2025-05-14 PROCEDURE — 63045 LAM FACETEC & FORAMOT CRV: CPT | Performed by: ORTHOPAEDIC SURGERY

## 2025-05-14 PROCEDURE — 22600 ARTHRD PST TQ 1NTRSPC CRV: CPT | Performed by: ORTHOPAEDIC SURGERY

## 2025-05-14 DEVICE — SCREW SPINE YUKON 35X14MM: Type: IMPLANTABLE DEVICE | Site: SPINE CERVICAL | Status: FUNCTIONAL

## 2025-05-14 DEVICE — IMPLANTABLE DEVICE: Type: IMPLANTABLE DEVICE | Site: SPINE CERVICAL | Status: FUNCTIONAL

## 2025-05-14 DEVICE — SCREW SPINE SET LKING CAP: Type: IMPLANTABLE DEVICE | Site: SPINE CERVICAL | Status: FUNCTIONAL

## 2025-05-14 DEVICE — ALLOGRAFT BNE CHIP 1-4 MM 15 CC CRUSH CANC: Type: IMPLANTABLE DEVICE | Site: SPINE CERVICAL | Status: FUNCTIONAL

## 2025-05-14 DEVICE — BIO DBM PLUS PUTTY (WITH CANCELLOUS)
Type: IMPLANTABLE DEVICE | Site: SPINE CERVICAL | Status: FUNCTIONAL
Brand: BIO DBM

## 2025-05-14 RX ORDER — SODIUM CHLORIDE 0.9 % (FLUSH) 0.9 %
5-40 SYRINGE (ML) INJECTION EVERY 12 HOURS SCHEDULED
Status: DISCONTINUED | OUTPATIENT
Start: 2025-05-14 | End: 2025-05-22 | Stop reason: HOSPADM

## 2025-05-14 RX ORDER — EPHEDRINE SULFATE 5 MG/ML
INJECTION INTRAVENOUS
Status: DISCONTINUED | OUTPATIENT
Start: 2025-05-14 | End: 2025-05-14 | Stop reason: SDUPTHER

## 2025-05-14 RX ORDER — LIDOCAINE HYDROCHLORIDE ANHYDROUS AND DEXTROSE MONOHYDRATE 5; 400 G/100ML; MG/100ML
1 INJECTION, SOLUTION INTRAVENOUS CONTINUOUS
Status: ACTIVE | OUTPATIENT
Start: 2025-05-14 | End: 2025-05-15

## 2025-05-14 RX ORDER — DIPHENHYDRAMINE HCL 25 MG
25 CAPSULE ORAL EVERY 6 HOURS PRN
Status: DISCONTINUED | OUTPATIENT
Start: 2025-05-14 | End: 2025-05-22 | Stop reason: HOSPADM

## 2025-05-14 RX ORDER — ONDANSETRON 2 MG/ML
4 INJECTION INTRAMUSCULAR; INTRAVENOUS
Status: DISCONTINUED | OUTPATIENT
Start: 2025-05-14 | End: 2025-05-14 | Stop reason: HOSPADM

## 2025-05-14 RX ORDER — KETOROLAC TROMETHAMINE 15 MG/ML
15 INJECTION, SOLUTION INTRAMUSCULAR; INTRAVENOUS EVERY 6 HOURS
Status: COMPLETED | OUTPATIENT
Start: 2025-05-14 | End: 2025-05-15

## 2025-05-14 RX ORDER — SODIUM CHLORIDE 0.9 % (FLUSH) 0.9 %
5-40 SYRINGE (ML) INJECTION EVERY 12 HOURS SCHEDULED
Status: DISCONTINUED | OUTPATIENT
Start: 2025-05-14 | End: 2025-05-14 | Stop reason: HOSPADM

## 2025-05-14 RX ORDER — PROMETHAZINE HYDROCHLORIDE 25 MG/1
12.5 TABLET ORAL EVERY 6 HOURS PRN
Status: DISCONTINUED | OUTPATIENT
Start: 2025-05-14 | End: 2025-05-22 | Stop reason: HOSPADM

## 2025-05-14 RX ORDER — FENTANYL CITRATE 50 UG/ML
100 INJECTION, SOLUTION INTRAMUSCULAR; INTRAVENOUS
Status: DISCONTINUED | OUTPATIENT
Start: 2025-05-14 | End: 2025-05-14 | Stop reason: HOSPADM

## 2025-05-14 RX ORDER — DEXAMETHASONE SODIUM PHOSPHATE 10 MG/ML
INJECTION, SOLUTION INTRA-ARTICULAR; INTRALESIONAL; INTRAMUSCULAR; INTRAVENOUS; SOFT TISSUE
Status: DISCONTINUED | OUTPATIENT
Start: 2025-05-14 | End: 2025-05-14 | Stop reason: SDUPTHER

## 2025-05-14 RX ORDER — PROCHLORPERAZINE EDISYLATE 5 MG/ML
5 INJECTION INTRAMUSCULAR; INTRAVENOUS
Status: DISCONTINUED | OUTPATIENT
Start: 2025-05-14 | End: 2025-05-14 | Stop reason: HOSPADM

## 2025-05-14 RX ORDER — LABETALOL HYDROCHLORIDE 5 MG/ML
10 INJECTION, SOLUTION INTRAVENOUS
Status: DISCONTINUED | OUTPATIENT
Start: 2025-05-14 | End: 2025-05-14 | Stop reason: HOSPADM

## 2025-05-14 RX ORDER — LIDOCAINE HYDROCHLORIDE ANHYDROUS AND DEXTROSE MONOHYDRATE 5; 400 G/100ML; MG/100ML
INJECTION, SOLUTION INTRAVENOUS
Status: DISCONTINUED | OUTPATIENT
Start: 2025-05-14 | End: 2025-05-14 | Stop reason: SDUPTHER

## 2025-05-14 RX ORDER — SODIUM CHLORIDE, SODIUM LACTATE, POTASSIUM CHLORIDE, CALCIUM CHLORIDE 600; 310; 30; 20 MG/100ML; MG/100ML; MG/100ML; MG/100ML
INJECTION, SOLUTION INTRAVENOUS CONTINUOUS
Status: DISCONTINUED | OUTPATIENT
Start: 2025-05-14 | End: 2025-05-14 | Stop reason: HOSPADM

## 2025-05-14 RX ORDER — HYDROMORPHONE HYDROCHLORIDE 1 MG/ML
0.5 INJECTION, SOLUTION INTRAMUSCULAR; INTRAVENOUS; SUBCUTANEOUS
Status: DISCONTINUED | OUTPATIENT
Start: 2025-05-14 | End: 2025-05-22 | Stop reason: HOSPADM

## 2025-05-14 RX ORDER — POLYETHYLENE GLYCOL 3350 17 G/17G
17 POWDER, FOR SOLUTION ORAL DAILY
Status: DISCONTINUED | OUTPATIENT
Start: 2025-05-14 | End: 2025-05-22 | Stop reason: HOSPADM

## 2025-05-14 RX ORDER — OXYCODONE HYDROCHLORIDE 5 MG/1
5 TABLET ORAL EVERY 4 HOURS PRN
Status: DISCONTINUED | OUTPATIENT
Start: 2025-05-14 | End: 2025-05-22 | Stop reason: HOSPADM

## 2025-05-14 RX ORDER — SUCCINYLCHOLINE CHLORIDE 20 MG/ML
INJECTION INTRAMUSCULAR; INTRAVENOUS
Status: DISCONTINUED | OUTPATIENT
Start: 2025-05-14 | End: 2025-05-14 | Stop reason: SDUPTHER

## 2025-05-14 RX ORDER — BISACODYL 5 MG/1
5 TABLET, DELAYED RELEASE ORAL DAILY
Status: DISCONTINUED | OUTPATIENT
Start: 2025-05-14 | End: 2025-05-22 | Stop reason: HOSPADM

## 2025-05-14 RX ORDER — BUPIVACAINE HYDROCHLORIDE AND EPINEPHRINE 2.5; 5 MG/ML; UG/ML
INJECTION, SOLUTION EPIDURAL; INFILTRATION; INTRACAUDAL; PERINEURAL PRN
Status: DISCONTINUED | OUTPATIENT
Start: 2025-05-14 | End: 2025-05-14 | Stop reason: ALTCHOICE

## 2025-05-14 RX ORDER — SODIUM CHLORIDE 0.9 % (FLUSH) 0.9 %
5-40 SYRINGE (ML) INJECTION PRN
Status: DISCONTINUED | OUTPATIENT
Start: 2025-05-14 | End: 2025-05-22 | Stop reason: HOSPADM

## 2025-05-14 RX ORDER — FENTANYL CITRATE 50 UG/ML
INJECTION, SOLUTION INTRAMUSCULAR; INTRAVENOUS
Status: DISCONTINUED | OUTPATIENT
Start: 2025-05-14 | End: 2025-05-14 | Stop reason: SDUPTHER

## 2025-05-14 RX ORDER — OXYCODONE HYDROCHLORIDE 5 MG/1
10 TABLET ORAL EVERY 4 HOURS PRN
Status: DISCONTINUED | OUTPATIENT
Start: 2025-05-14 | End: 2025-05-22 | Stop reason: HOSPADM

## 2025-05-14 RX ORDER — SODIUM CHLORIDE 9 MG/ML
INJECTION, SOLUTION INTRAVENOUS PRN
Status: DISCONTINUED | OUTPATIENT
Start: 2025-05-14 | End: 2025-05-14 | Stop reason: HOSPADM

## 2025-05-14 RX ORDER — MIDAZOLAM HYDROCHLORIDE 2 MG/2ML
2 INJECTION, SOLUTION INTRAMUSCULAR; INTRAVENOUS
Status: DISCONTINUED | OUTPATIENT
Start: 2025-05-14 | End: 2025-05-14 | Stop reason: HOSPADM

## 2025-05-14 RX ORDER — OXYCODONE HYDROCHLORIDE 5 MG/1
5 TABLET ORAL
Status: DISCONTINUED | OUTPATIENT
Start: 2025-05-14 | End: 2025-05-14 | Stop reason: HOSPADM

## 2025-05-14 RX ORDER — VANCOMYCIN HYDROCHLORIDE 1 G/20ML
INJECTION, POWDER, LYOPHILIZED, FOR SOLUTION INTRAVENOUS PRN
Status: DISCONTINUED | OUTPATIENT
Start: 2025-05-14 | End: 2025-05-14 | Stop reason: ALTCHOICE

## 2025-05-14 RX ORDER — LIDOCAINE HYDROCHLORIDE 20 MG/ML
INJECTION, SOLUTION EPIDURAL; INFILTRATION; INTRACAUDAL; PERINEURAL
Status: DISCONTINUED | OUTPATIENT
Start: 2025-05-14 | End: 2025-05-14 | Stop reason: SDUPTHER

## 2025-05-14 RX ORDER — SODIUM CHLORIDE 9 MG/ML
INJECTION, SOLUTION INTRAVENOUS PRN
Status: DISCONTINUED | OUTPATIENT
Start: 2025-05-14 | End: 2025-05-22 | Stop reason: HOSPADM

## 2025-05-14 RX ORDER — DIPHENHYDRAMINE HYDROCHLORIDE 50 MG/ML
25 INJECTION, SOLUTION INTRAMUSCULAR; INTRAVENOUS EVERY 6 HOURS PRN
Status: DISCONTINUED | OUTPATIENT
Start: 2025-05-14 | End: 2025-05-22 | Stop reason: HOSPADM

## 2025-05-14 RX ORDER — NALOXONE HYDROCHLORIDE 0.4 MG/ML
INJECTION, SOLUTION INTRAMUSCULAR; INTRAVENOUS; SUBCUTANEOUS PRN
Status: DISCONTINUED | OUTPATIENT
Start: 2025-05-14 | End: 2025-05-14 | Stop reason: HOSPADM

## 2025-05-14 RX ORDER — LIDOCAINE HYDROCHLORIDE ANHYDROUS AND DEXTROSE MONOHYDRATE 5; 400 G/100ML; MG/100ML
INJECTION, SOLUTION INTRAVENOUS
Status: DISCONTINUED | OUTPATIENT
Start: 2025-05-14 | End: 2025-05-14

## 2025-05-14 RX ORDER — SODIUM CHLORIDE, SODIUM LACTATE, POTASSIUM CHLORIDE, CALCIUM CHLORIDE 600; 310; 30; 20 MG/100ML; MG/100ML; MG/100ML; MG/100ML
INJECTION, SOLUTION INTRAVENOUS
Status: DISCONTINUED | OUTPATIENT
Start: 2025-05-14 | End: 2025-05-14 | Stop reason: SDUPTHER

## 2025-05-14 RX ORDER — CYCLOBENZAPRINE HCL 10 MG
10 TABLET ORAL 3 TIMES DAILY PRN
Status: DISCONTINUED | OUTPATIENT
Start: 2025-05-14 | End: 2025-05-22 | Stop reason: HOSPADM

## 2025-05-14 RX ORDER — ONDANSETRON 2 MG/ML
INJECTION INTRAMUSCULAR; INTRAVENOUS
Status: DISCONTINUED | OUTPATIENT
Start: 2025-05-14 | End: 2025-05-14 | Stop reason: SDUPTHER

## 2025-05-14 RX ORDER — LABETALOL HYDROCHLORIDE 5 MG/ML
10 INJECTION, SOLUTION INTRAVENOUS ONCE
Status: COMPLETED | OUTPATIENT
Start: 2025-05-14 | End: 2025-05-14

## 2025-05-14 RX ORDER — PROPOFOL 10 MG/ML
INJECTION, EMULSION INTRAVENOUS
Status: DISCONTINUED | OUTPATIENT
Start: 2025-05-14 | End: 2025-05-14 | Stop reason: SDUPTHER

## 2025-05-14 RX ORDER — LABETALOL HYDROCHLORIDE 5 MG/ML
10 INJECTION, SOLUTION INTRAVENOUS EVERY 6 HOURS PRN
Status: DISCONTINUED | OUTPATIENT
Start: 2025-05-14 | End: 2025-05-15

## 2025-05-14 RX ORDER — ACETAMINOPHEN 325 MG/1
650 TABLET ORAL EVERY 6 HOURS
Status: DISCONTINUED | OUTPATIENT
Start: 2025-05-14 | End: 2025-05-22 | Stop reason: HOSPADM

## 2025-05-14 RX ORDER — LISINOPRIL 20 MG/1
20 TABLET ORAL DAILY
Status: DISCONTINUED | OUTPATIENT
Start: 2025-05-14 | End: 2025-05-17

## 2025-05-14 RX ORDER — ACETAMINOPHEN 500 MG
1000 TABLET ORAL ONCE
Status: COMPLETED | OUTPATIENT
Start: 2025-05-14 | End: 2025-05-14

## 2025-05-14 RX ORDER — SODIUM CHLORIDE 0.9 % (FLUSH) 0.9 %
5-40 SYRINGE (ML) INJECTION PRN
Status: DISCONTINUED | OUTPATIENT
Start: 2025-05-14 | End: 2025-05-14 | Stop reason: HOSPADM

## 2025-05-14 RX ORDER — HYDRALAZINE HYDROCHLORIDE 20 MG/ML
10 INJECTION INTRAMUSCULAR; INTRAVENOUS ONCE
Status: COMPLETED | OUTPATIENT
Start: 2025-05-14 | End: 2025-05-14

## 2025-05-14 RX ORDER — HYDRALAZINE HYDROCHLORIDE 20 MG/ML
10 INJECTION INTRAMUSCULAR; INTRAVENOUS
Status: DISCONTINUED | OUTPATIENT
Start: 2025-05-14 | End: 2025-05-14 | Stop reason: HOSPADM

## 2025-05-14 RX ORDER — LIDOCAINE HYDROCHLORIDE 10 MG/ML
1 INJECTION, SOLUTION INFILTRATION; PERINEURAL
Status: DISCONTINUED | OUTPATIENT
Start: 2025-05-14 | End: 2025-05-14 | Stop reason: HOSPADM

## 2025-05-14 RX ORDER — PANTOPRAZOLE SODIUM 40 MG/1
40 TABLET, DELAYED RELEASE ORAL
Status: DISCONTINUED | OUTPATIENT
Start: 2025-05-15 | End: 2025-05-22 | Stop reason: HOSPADM

## 2025-05-14 RX ORDER — ONDANSETRON 2 MG/ML
4 INJECTION INTRAMUSCULAR; INTRAVENOUS EVERY 6 HOURS PRN
Status: DISCONTINUED | OUTPATIENT
Start: 2025-05-14 | End: 2025-05-22 | Stop reason: HOSPADM

## 2025-05-14 RX ORDER — KETAMINE HCL IN NACL, ISO-OSM 20 MG/2 ML
SYRINGE (ML) INJECTION
Status: DISCONTINUED | OUTPATIENT
Start: 2025-05-14 | End: 2025-05-14 | Stop reason: SDUPTHER

## 2025-05-14 RX ORDER — SODIUM CHLORIDE 9 MG/ML
INJECTION, SOLUTION INTRAVENOUS CONTINUOUS
Status: ACTIVE | OUTPATIENT
Start: 2025-05-14 | End: 2025-05-15

## 2025-05-14 RX ADMIN — FENTANYL CITRATE 100 MCG: 50 INJECTION, SOLUTION INTRAMUSCULAR; INTRAVENOUS at 08:31

## 2025-05-14 RX ADMIN — DEXAMETHASONE SODIUM PHOSPHATE 10 MG: 10 INJECTION INTRAMUSCULAR; INTRAVENOUS at 09:29

## 2025-05-14 RX ADMIN — Medication 10 MG: at 10:48

## 2025-05-14 RX ADMIN — Medication 20 MG: at 08:43

## 2025-05-14 RX ADMIN — ACETAMINOPHEN 650 MG: 325 TABLET ORAL at 20:30

## 2025-05-14 RX ADMIN — Medication 1000 MG: at 11:16

## 2025-05-14 RX ADMIN — Medication 3 AMPULE: at 06:21

## 2025-05-14 RX ADMIN — LIDOCAINE HYDROCHLORIDE 100 MG: 20 INJECTION, SOLUTION EPIDURAL; INFILTRATION; INTRACAUDAL; PERINEURAL at 08:31

## 2025-05-14 RX ADMIN — PROPOFOL 100 MG: 10 INJECTION, EMULSION INTRAVENOUS at 08:48

## 2025-05-14 RX ADMIN — Medication 2 G: at 08:51

## 2025-05-14 RX ADMIN — CHOLECALCIFEROL TAB 125 MCG (5000 UNIT) 5000 UNITS: 125 TAB at 15:39

## 2025-05-14 RX ADMIN — SODIUM CHLORIDE, SODIUM LACTATE, POTASSIUM CHLORIDE, AND CALCIUM CHLORIDE: 600; 310; 30; 20 INJECTION, SOLUTION INTRAVENOUS at 08:23

## 2025-05-14 RX ADMIN — PHENYLEPHRINE HYDROCHLORIDE 100 MCG: 0.1 INJECTION, SOLUTION INTRAVENOUS at 11:11

## 2025-05-14 RX ADMIN — LABETALOL HYDROCHLORIDE 10 MG: 5 INJECTION, SOLUTION INTRAVENOUS at 20:31

## 2025-05-14 RX ADMIN — ONDANSETRON 4 MG: 2 INJECTION INTRAMUSCULAR; INTRAVENOUS at 11:16

## 2025-05-14 RX ADMIN — ACETAMINOPHEN 650 MG: 325 TABLET ORAL at 15:39

## 2025-05-14 RX ADMIN — HYDRALAZINE HYDROCHLORIDE 10 MG: 20 INJECTION INTRAMUSCULAR; INTRAVENOUS at 17:47

## 2025-05-14 RX ADMIN — ACETAMINOPHEN 1000 MG: 500 TABLET, FILM COATED ORAL at 05:52

## 2025-05-14 RX ADMIN — PHENYLEPHRINE HYDROCHLORIDE 40 MCG/MIN: 10 INJECTION INTRAVENOUS at 08:52

## 2025-05-14 RX ADMIN — SODIUM CHLORIDE, SODIUM LACTATE, POTASSIUM CHLORIDE, AND CALCIUM CHLORIDE: .6; .31; .03; .02 INJECTION, SOLUTION INTRAVENOUS at 06:12

## 2025-05-14 RX ADMIN — SODIUM CHLORIDE, SODIUM LACTATE, POTASSIUM CHLORIDE, CALCIUM CHLORIDE: 600; 310; 30; 20 INJECTION, SOLUTION INTRAVENOUS at 08:39

## 2025-05-14 RX ADMIN — Medication 160 MG: at 08:33

## 2025-05-14 RX ADMIN — Medication 20 MG: at 08:49

## 2025-05-14 RX ADMIN — EPHEDRINE SULFATE 5 MG: 5 INJECTION INTRAVENOUS at 08:36

## 2025-05-14 RX ADMIN — KETOROLAC TROMETHAMINE 15 MG: 15 INJECTION, SOLUTION INTRAMUSCULAR; INTRAVENOUS at 15:40

## 2025-05-14 RX ADMIN — BISACODYL 5 MG: 5 TABLET, COATED ORAL at 15:39

## 2025-05-14 RX ADMIN — KETOROLAC TROMETHAMINE 15 MG: 15 INJECTION, SOLUTION INTRAMUSCULAR; INTRAVENOUS at 20:30

## 2025-05-14 RX ADMIN — PROPOFOL 200 MG: 10 INJECTION, EMULSION INTRAVENOUS at 08:31

## 2025-05-14 RX ADMIN — Medication 10 MG: at 10:06

## 2025-05-14 RX ADMIN — SODIUM CHLORIDE: 0.9 INJECTION, SOLUTION INTRAVENOUS at 15:53

## 2025-05-14 RX ADMIN — PHENYLEPHRINE HYDROCHLORIDE 100 MCG: 0.1 INJECTION, SOLUTION INTRAVENOUS at 11:17

## 2025-05-14 RX ADMIN — LISINOPRIL 20 MG: 20 TABLET ORAL at 15:39

## 2025-05-14 RX ADMIN — LIDOCAINE HYDROCHLORIDE ANHYDROUS AND DEXTROSE MONOHYDRATE 1 MG/KG/HR: .4; 5 INJECTION, SOLUTION INTRAVENOUS at 12:02

## 2025-05-14 RX ADMIN — PROPOFOL 200 MCG/KG/MIN: 10 INJECTION, EMULSION INTRAVENOUS at 08:41

## 2025-05-14 RX ADMIN — OXYCODONE 10 MG: 5 TABLET ORAL at 15:40

## 2025-05-14 RX ADMIN — PHENYLEPHRINE HYDROCHLORIDE 200 MCG: 0.1 INJECTION, SOLUTION INTRAVENOUS at 08:36

## 2025-05-14 RX ADMIN — SODIUM CHLORIDE, PRESERVATIVE FREE 10 ML: 5 INJECTION INTRAVENOUS at 20:36

## 2025-05-14 RX ADMIN — LABETALOL HYDROCHLORIDE 10 MG: 5 INJECTION, SOLUTION INTRAVENOUS at 23:51

## 2025-05-14 RX ADMIN — CEFAZOLIN 2000 MG: 10 INJECTION, POWDER, FOR SOLUTION INTRAVENOUS at 17:47

## 2025-05-14 RX ADMIN — PHENYLEPHRINE HYDROCHLORIDE 100 MCG: 0.1 INJECTION, SOLUTION INTRAVENOUS at 11:27

## 2025-05-14 RX ADMIN — PHENYLEPHRINE HYDROCHLORIDE 30 MCG/MIN: 10 INJECTION INTRAVENOUS at 08:48

## 2025-05-14 RX ADMIN — Medication 1000 MG: at 08:50

## 2025-05-14 RX ADMIN — LIDOCAINE HYDROCHLORIDE 1.5 MG/KG/HR: 4 INJECTION, SOLUTION INTRAVENOUS at 08:40

## 2025-05-14 RX ADMIN — HYDROMORPHONE HYDROCHLORIDE 1 MG: 0.5 INJECTION, SOLUTION INTRAMUSCULAR; INTRAVENOUS; SUBCUTANEOUS at 09:33

## 2025-05-14 RX ADMIN — PHENYLEPHRINE HYDROCHLORIDE 200 MCG: 0.1 INJECTION, SOLUTION INTRAVENOUS at 08:57

## 2025-05-14 RX ADMIN — POLYETHYLENE GLYCOL 3350 17 G: 17 POWDER, FOR SOLUTION ORAL at 15:42

## 2025-05-14 ASSESSMENT — PAIN SCALES - GENERAL
PAINLEVEL_OUTOF10: 0
PAINLEVEL_OUTOF10: 2
PAINLEVEL_OUTOF10: 2
PAINLEVEL_OUTOF10: 7

## 2025-05-14 ASSESSMENT — PAIN DESCRIPTION - PAIN TYPE: TYPE: SURGICAL PAIN

## 2025-05-14 ASSESSMENT — PAIN DESCRIPTION - ONSET: ONSET: ON-GOING

## 2025-05-14 ASSESSMENT — PAIN DESCRIPTION - FREQUENCY: FREQUENCY: INTERMITTENT

## 2025-05-14 ASSESSMENT — PAIN DESCRIPTION - LOCATION: LOCATION: INCISION

## 2025-05-14 ASSESSMENT — LIFESTYLE VARIABLES: SMOKING_STATUS: 1

## 2025-05-14 ASSESSMENT — PAIN DESCRIPTION - DESCRIPTORS: DESCRIPTORS: ACHING

## 2025-05-14 ASSESSMENT — PAIN DESCRIPTION - ORIENTATION: ORIENTATION: POSTERIOR

## 2025-05-14 ASSESSMENT — PAIN - FUNCTIONAL ASSESSMENT: PAIN_FUNCTIONAL_ASSESSMENT: PREVENTS OR INTERFERES SOME ACTIVE ACTIVITIES AND ADLS

## 2025-05-14 NOTE — PERIOP NOTE
TRANSFER - OUT REPORT:    Verbal report given to KARLEY Sorenson on Shlomo Shad Rudolph Jr.  being transferred to Room 735 for routine progression of patient care       Report consisted of patient’s Situation, Background, Assessment and   Recommendations(SBAR).     Information from the following report(s) Nurse Handoff Report, Index, Adult Overview, Surgery Report, MAR, Cardiac Rhythm SR, and Neuro Assessment was reviewed with the receiving nurse.    Lines:   Peripheral IV 05/14/25 Posterior;Right Hand (Active)   Site Assessment Clean, dry & intact 05/14/25 1352   Line Status Infusing 05/14/25 1352   Line Care Connections checked and tightened 05/14/25 1352   Phlebitis Assessment No symptoms 05/14/25 1352   Infiltration Assessment 0 05/14/25 1352   Alcohol Cap Used No 05/14/25 1352   Dressing Status Clean, dry & intact 05/14/25 1352   Dressing Type Transparent 05/14/25 1352   Dressing Intervention New 05/14/25 0612       Peripheral IV 05/14/25 Left;Posterior Hand (Active)   Site Assessment Clean, dry & intact 05/14/25 1352   Line Status Capped 05/14/25 1352   Line Care Connections checked and tightened 05/14/25 1352   Phlebitis Assessment No symptoms 05/14/25 1352   Infiltration Assessment 0 05/14/25 1352   Alcohol Cap Used No 05/14/25 1352   Dressing Status Clean, dry & intact 05/14/25 1352   Dressing Type Transparent 05/14/25 1352        Opportunity for questions and clarification was provided.      Patient transported with:   Tech    VTE prophylaxis orders have been written for Shlomo Rudolph Jr..    Patient and family given floor number and nurses name.  Family updated re: pt status after security code verified.

## 2025-05-14 NOTE — ANESTHESIA PROCEDURE NOTES
Arterial Line:    An arterial line was placed using ultrasound guidance, in the OR for the following indication(s): continuous blood pressure monitoring.    A  (size), 1 and 1/4 inch (length), Arrow and 1.5 inch (type) catheter was placed, Seldinger technique used, into the right radial artery, secured by tape and Tegaderm.  Anesthesia type: General    Events:  patient tolerated procedure well with no complications and EBL < 5mL.    Additional notes:  Left arm prepped with ChloraPrep, 0.8ml of 1% lidocaine infiltrated at skin, ultrasound guided Seldinger technique, good blood return, good waveform.      Potential access sites were examined with ultrasound and acceptable patent access site selected as noted above.  Needle path and artery access visualized in real time using ultrasound, an image of wire in vessel recorded for permanent record.   5/14/2025 8:35 AM5/14/2025 8:40 AM  Resident/CRNA: Boom Nye Jr., APRN - CRNA  Performed: Resident/CRNA

## 2025-05-14 NOTE — ANESTHESIA POSTPROCEDURE EVALUATION
Department of Anesthesiology  Postprocedure Note    Patient: Shlomo Rudolph Jr.  MRN: [id number]  YOB: 1955  Date of evaluation: 5/14/2025    Procedure Summary       Date: 05/14/25 Room / Location: [facility] MAIN OR  / [facility] MAIN OR    Anesthesia Start: 0823 Anesthesia Stop: 1153    Procedure: ERAS C3-C5  posterior cervical fusion with allograft and instrumentation and C2-C4 laminectomy (Spine Cervical) Diagnosis:       Cervical myelopathy (HCC)      Myelopathic gait      DDD (degenerative disc disease), cervical      (Cervical myelopathy (HCC) [G95.9])      (Myelopathic gait [R26.89])      (DDD (degenerative disc disease), cervical [M50.30])    Providers: Behzad Webster Jr., MD Responsible Provider: Duog Anderson DO    Anesthesia Type: general ASA Status: 3            Anesthesia Type: No value filed.    Sky Phase I: Sky Score: 10    Sky Phase II:      Anesthesia Post Evaluation    Patient location during evaluation: PACU  Level of consciousness: awake and alert  Airway patency: patent  Nausea & Vomiting: no nausea  Cardiovascular status: hemodynamically stable  Respiratory status: acceptable  Hydration status: euvolemic  Comments: Blood pressure (!) 188/101, pulse 90, temperature 98 °F (36.7 °C), temperature source Axillary, resp. rate 16, weight 79.5 kg (175 lb 3.2 oz), SpO2 97%.  Pain management: satisfactory to patient    No notable events documented.

## 2025-05-14 NOTE — PROGRESS NOTES
TRANSFER - IN REPORT:    Verbal report received from Lázaro CRANDALL on Shlomo Rudolph .  being received from PACU for routine post-op      Report consisted of patient's Situation, Background, Assessment and   Recommendations(SBAR).     Information from the following report(s) Nurse Handoff Report was reviewed with the receiving nurse.    Opportunity for questions and clarification was provided.      Assessment  will be completed upon patient's arrival to unit and care assumed.

## 2025-05-14 NOTE — PROGRESS NOTES
ACUTE PHYSICAL THERAPY GOALS:   (Developed with and agreed upon by patient and/or caregiver.)  Pt will perform bed mobility with SBA while appropriately maintaining spinal precautions in 7 therapy sessions.  Pt will perform sit-to-stand/ stand-to-sit transfers SBA in 7 therapy sessions.  Pt will ambulate 150 ft SBA with use of LRAD/no device and breaks as needed in 7 therapy sessions.  Pt will tolerate 15 minutes of standing activity with LRAD/no device in 7 therapy sessions.  Pt will perform standing dynamic balance activities with minimal postural sway in 7 therapy sessions.  Pt will tolerate multiple sets and reps of BLE exercises in 7 therapy sessions.  Pt will accurately verbalize and demonstrate spinal precautions in 7 therapy sessions.     PHYSICAL THERAPY Initial Assessment and PM  (Link to Caseload Tracking: PT Visit Days : 1  Acknowledge Orders  Time In/Out  PT Charge Capture  Rehab Caseload Tracker    Spinal precautions, log roll, and aspen collar    Shlomo Rudolph Jr. is a 70 y.o. male   PRIMARY DIAGNOSIS: Cervical myelopathy (HCC)  Cervical myelopathy (HCC) [G95.9]  Myelopathic gait [R26.89]  DDD (degenerative disc disease), cervical [M50.30]  Procedure(s) (LRB):  ERAS C3-C5  posterior cervical fusion with allograft and instrumentation and C2-C4 laminectomy (N/A)  * Day of Surgery *  Reason for Referral: Other abnormalities of gait and mobility (R26.89)  Inpatient: Payor: [employer] MEDICARE / Plan: [employer] CHOICE-O MEDICARE / Product Type: *No Product type* /     ASSESSMENT:     REHAB RECOMMENDATIONS:   Recommendation to date pending progress:  Setting:  Short-term Rehab    Equipment:    To Be Determined - pt owns and uses a RW     ASSESSMENT:  Mr. Rudolph is a 70 y.o. male presenting to PT s/p the above procedure. At baseline, pt ambulates with a RW and lives alone in an apartment. Prior to mobility, spinal spinal precautions were reviewed with pt and family present in room and pt verbalized

## 2025-05-14 NOTE — PROGRESS NOTES
4 Eyes Skin Assessment     NAME:  Shlomo Rudolph Jr.  YOB: 1955  MEDICAL RECORD NUMBER:  [id number]    The patient is being assessed for  Admission    I agree that at least one RN has performed a thorough Head to Toe Skin Assessment on the patient. ALL assessment sites listed below have been assessed.      Areas assessed by both nurses:    Sacrum. Buttock, Coccyx, Ischium        Does the Patient have a Wound? No noted wound(s)       Devaughn Prevention initiated by RN: Yes  Wound Care Orders initiated by RN: No    Pressure Injury (Stage 3,4, Unstageable, DTI, NWPT, and Complex wounds) if present, place Wound referral order by RN under : No    New Ostomies, if present place, Ostomy referral order under : No     Nurse 1 eSignature: Electronically signed by Yas Augustin RN on 5/14/25 at 4:37 PM EDT    **SHARE this note so that the co-signing nurse can place an eSignature**    Nurse 2 eSignature: {Esignature:[id number]}

## 2025-05-14 NOTE — INTERVAL H&P NOTE
Update History & Physical    The patient's History and Physical of May 7, 2025 was reviewed with the patient and I examined the patient. There was no change. The surgical site was confirmed by the patient and me.     Strength testing in the upper extremity reveals the following based on the 5 point grading scale:       Delt(C5) Bicep(C6) WE(C6) Tricep (C7) WF(C7) (C8) Int (T1)   Right 5 5 5 5 5 5 5   Left 5 5 5 5 5 5 5     Lower Extremities:     HF (L2) KE (L3/4) ADF (L4) EHL (L5) APF (S1)   Right 5 5 5 5 5   Left 5 5 5 5 5     Normal work of breathing with no audible wheezing, RRR on peripheral pulse            Plan: The risks, benefits, expected outcome, and alternative to the recommended procedure have been discussed with the patient. Patient understands and wants to proceed with the procedure.     Electronically signed by Behzad Webster Jr, MD on 5/14/2025 at 7:02 AM

## 2025-05-14 NOTE — ANESTHESIA PRE PROCEDURE
Past Surgical History:        Procedure Laterality Date    COLONOSCOPY      ORTHOPEDIC SURGERY  1998    cervical spine fusion, dr mejia     TOTAL HIP ARTHROPLASTY Left 2007    TOTAL HIP ARTHROPLASTY Right 2009    Dr pedro    UROLOGICAL SURGERY      kidney stone removal - states doesn't know if was litho or cysto       Social History:    Social History     Tobacco Use    Smoking status: Every Day     Current packs/day: 1.00     Average packs/day: 1 pack/day for 20.0 years (20.0 ttl pk-yrs)     Types: Cigarettes     Passive exposure: Never    Smokeless tobacco: Never   Substance Use Topics    Alcohol use: Not Currently     Alcohol/week: 4.0 standard drinks of alcohol     Types: 4 Drinks containing 0.5 oz of alcohol per week     Comment: 4 mini botles last night                                Ready to quit: Not Answered  Counseling given: Not Answered      Vital Signs (Current):   Vitals:    05/14/25 0555   BP: (!) 188/94   Pulse: 82   Resp: 18   Temp: 97.2 °F (36.2 °C)   TempSrc: Infrared   SpO2: 99%   Weight: 79.5 kg (175 lb 3.2 oz)                                              BP Readings from Last 3 Encounters:   05/14/25 (!) 188/94   05/08/25 (!) 150/90   02/19/25 131/73       NPO Status: Time of last liquid consumption: 0415                        Time of last solid consumption: 2200                        Date of last liquid consumption: 05/14/25                        Date of last solid food consumption: 05/13/25    BMI:   Wt Readings from Last 3 Encounters:   05/14/25 79.5 kg (175 lb 3.2 oz)   05/08/25 78 kg (171 lb 14.4 oz)   02/19/25 79.2 kg (174 lb 8 oz)     Body mass index is 23.76 kg/m².    CBC:   Lab Results   Component Value Date/Time    WBC 5.4 05/08/2025 01:05 PM    RBC 5.14 05/08/2025 01:05 PM    HGB 14.8 05/08/2025 01:05 PM    HCT 45.6 05/08/2025 01:05 PM    MCV 88.7 05/08/2025 01:05 PM    RDW 13.0 05/08/2025 01:05 PM     05/08/2025 01:05 PM       CMP:   Lab Results   Component Value

## 2025-05-14 NOTE — PROGRESS NOTES
ERAS End of Shift-Days For Spine Surgery Patient    * Day of Surgery *   Ideal body weight: 77.6 kg (171 lb 1.2 oz)    OOB POD#0 (within 6 hours of end anesthesia-if not contraindicated) Yes    ADULT DIET; Regular  ADULT ORAL NUTRITION SUPPLEMENT; AM Snack, PM Snack; Other Oral Supplement; Ensure Surgery    Tolerating Diet?: Yes    Ensure Surgery Immunonutrition Shakes - 2 per day (Starting POD 0)?  Yes    Ambulated in olivia 0 times.     Up to chair for 1 meals.    Lidocaine: Yes Discontinued POD#1 approx 0600 Not time yet    PRN Pain Medications Used?: Yes    IS Used 10x/hr while awake: Yes     DRAINS? Yes    Najera? No    BM?  No   Passing flatus? Yes    TEDs Yes    SCDs No         Signed By: Yas Augustin RN     May 14, 2025

## 2025-05-15 LAB
GLUCOSE BLD STRIP.AUTO-MCNC: 108 MG/DL (ref 65–100)
GLUCOSE BLD STRIP.AUTO-MCNC: 159 MG/DL (ref 65–100)
SERVICE CMNT-IMP: ABNORMAL
SERVICE CMNT-IMP: ABNORMAL

## 2025-05-15 PROCEDURE — 6360000002 HC RX W HCPCS: Performed by: FAMILY MEDICINE

## 2025-05-15 PROCEDURE — 6370000000 HC RX 637 (ALT 250 FOR IP): Performed by: ORTHOPAEDIC SURGERY

## 2025-05-15 PROCEDURE — 2500000003 HC RX 250 WO HCPCS: Performed by: ORTHOPAEDIC SURGERY

## 2025-05-15 PROCEDURE — 1100000000 HC RM PRIVATE

## 2025-05-15 PROCEDURE — 6370000000 HC RX 637 (ALT 250 FOR IP): Performed by: NURSE PRACTITIONER

## 2025-05-15 PROCEDURE — 97535 SELF CARE MNGMENT TRAINING: CPT

## 2025-05-15 PROCEDURE — 97165 OT EVAL LOW COMPLEX 30 MIN: CPT

## 2025-05-15 PROCEDURE — 2580000003 HC RX 258: Performed by: ORTHOPAEDIC SURGERY

## 2025-05-15 PROCEDURE — 6370000000 HC RX 637 (ALT 250 FOR IP): Performed by: INTERNAL MEDICINE

## 2025-05-15 PROCEDURE — 82962 GLUCOSE BLOOD TEST: CPT

## 2025-05-15 PROCEDURE — 97530 THERAPEUTIC ACTIVITIES: CPT

## 2025-05-15 PROCEDURE — 6360000002 HC RX W HCPCS: Performed by: ORTHOPAEDIC SURGERY

## 2025-05-15 RX ORDER — METOPROLOL TARTRATE 25 MG/1
25 TABLET, FILM COATED ORAL 3 TIMES DAILY
Status: DISCONTINUED | OUTPATIENT
Start: 2025-05-15 | End: 2025-05-15

## 2025-05-15 RX ORDER — CLONIDINE HYDROCHLORIDE 0.2 MG/1
0.1 TABLET ORAL EVERY 4 HOURS PRN
Status: DISCONTINUED | OUTPATIENT
Start: 2025-05-15 | End: 2025-05-19

## 2025-05-15 RX ORDER — HYDRALAZINE HYDROCHLORIDE 25 MG/1
25 TABLET, FILM COATED ORAL EVERY 6 HOURS PRN
Status: DISCONTINUED | OUTPATIENT
Start: 2025-05-15 | End: 2025-05-22 | Stop reason: HOSPADM

## 2025-05-15 RX ORDER — NICOTINE 21 MG/24HR
1 PATCH, TRANSDERMAL 24 HOURS TRANSDERMAL DAILY PRN
Status: DISCONTINUED | OUTPATIENT
Start: 2025-05-15 | End: 2025-05-22 | Stop reason: HOSPADM

## 2025-05-15 RX ORDER — METOPROLOL TARTRATE 50 MG
50 TABLET ORAL 3 TIMES DAILY
Status: DISCONTINUED | OUTPATIENT
Start: 2025-05-15 | End: 2025-05-16

## 2025-05-15 RX ORDER — HYDRALAZINE HYDROCHLORIDE 20 MG/ML
10 INJECTION INTRAMUSCULAR; INTRAVENOUS ONCE
Status: COMPLETED | OUTPATIENT
Start: 2025-05-15 | End: 2025-05-15

## 2025-05-15 RX ORDER — CALCIUM CARBONATE 500 MG/1
500 TABLET, CHEWABLE ORAL 3 TIMES DAILY PRN
Status: DISCONTINUED | OUTPATIENT
Start: 2025-05-15 | End: 2025-05-22 | Stop reason: HOSPADM

## 2025-05-15 RX ORDER — INSULIN LISPRO 100 [IU]/ML
0-8 INJECTION, SOLUTION INTRAVENOUS; SUBCUTANEOUS
Status: DISCONTINUED | OUTPATIENT
Start: 2025-05-15 | End: 2025-05-20

## 2025-05-15 RX ORDER — DEXTROSE MONOHYDRATE 100 MG/ML
INJECTION, SOLUTION INTRAVENOUS CONTINUOUS PRN
Status: DISCONTINUED | OUTPATIENT
Start: 2025-05-15 | End: 2025-05-22 | Stop reason: HOSPADM

## 2025-05-15 RX ORDER — IBUPROFEN 600 MG/1
1 TABLET ORAL PRN
Status: DISCONTINUED | OUTPATIENT
Start: 2025-05-15 | End: 2025-05-22 | Stop reason: HOSPADM

## 2025-05-15 RX ADMIN — POLYETHYLENE GLYCOL 3350 17 G: 17 POWDER, FOR SOLUTION ORAL at 08:36

## 2025-05-15 RX ADMIN — ACETAMINOPHEN 650 MG: 325 TABLET ORAL at 20:39

## 2025-05-15 RX ADMIN — SODIUM CHLORIDE: 0.9 INJECTION, SOLUTION INTRAVENOUS at 01:33

## 2025-05-15 RX ADMIN — METOPROLOL TARTRATE 50 MG: 50 TABLET, FILM COATED ORAL at 20:40

## 2025-05-15 RX ADMIN — SODIUM CHLORIDE, PRESERVATIVE FREE 10 ML: 5 INJECTION INTRAVENOUS at 08:36

## 2025-05-15 RX ADMIN — METOPROLOL TARTRATE 25 MG: 25 TABLET, FILM COATED ORAL at 14:21

## 2025-05-15 RX ADMIN — ACETAMINOPHEN 650 MG: 325 TABLET ORAL at 08:35

## 2025-05-15 RX ADMIN — ACETAMINOPHEN 650 MG: 325 TABLET ORAL at 14:21

## 2025-05-15 RX ADMIN — CEFAZOLIN 2000 MG: 10 INJECTION, POWDER, FOR SOLUTION INTRAVENOUS at 01:29

## 2025-05-15 RX ADMIN — CALCIUM CARBONATE (ANTACID) CHEW TAB 500 MG 500 MG: 500 CHEW TAB at 22:14

## 2025-05-15 RX ADMIN — METOPROLOL TARTRATE 25 MG: 25 TABLET, FILM COATED ORAL at 10:18

## 2025-05-15 RX ADMIN — KETOROLAC TROMETHAMINE 15 MG: 15 INJECTION, SOLUTION INTRAMUSCULAR; INTRAVENOUS at 08:35

## 2025-05-15 RX ADMIN — LISINOPRIL 20 MG: 20 TABLET ORAL at 08:36

## 2025-05-15 RX ADMIN — HYDRALAZINE HYDROCHLORIDE 10 MG: 20 INJECTION INTRAMUSCULAR; INTRAVENOUS at 01:27

## 2025-05-15 RX ADMIN — PANTOPRAZOLE SODIUM 40 MG: 40 TABLET, DELAYED RELEASE ORAL at 06:06

## 2025-05-15 RX ADMIN — BISACODYL 5 MG: 5 TABLET, COATED ORAL at 08:36

## 2025-05-15 RX ADMIN — ACETAMINOPHEN 650 MG: 325 TABLET ORAL at 03:34

## 2025-05-15 RX ADMIN — KETOROLAC TROMETHAMINE 15 MG: 15 INJECTION, SOLUTION INTRAMUSCULAR; INTRAVENOUS at 03:34

## 2025-05-15 RX ADMIN — CHOLECALCIFEROL TAB 125 MCG (5000 UNIT) 5000 UNITS: 125 TAB at 08:36

## 2025-05-15 RX ADMIN — CLONIDINE HYDROCHLORIDE 0.1 MG: 0.2 TABLET ORAL at 22:14

## 2025-05-15 RX ADMIN — SODIUM CHLORIDE, PRESERVATIVE FREE 10 ML: 5 INJECTION INTRAVENOUS at 20:40

## 2025-05-15 RX ADMIN — CLONIDINE HYDROCHLORIDE 0.1 MG: 0.2 TABLET ORAL at 08:35

## 2025-05-15 ASSESSMENT — PAIN DESCRIPTION - ORIENTATION: ORIENTATION: POSTERIOR

## 2025-05-15 ASSESSMENT — PAIN DESCRIPTION - DESCRIPTORS: DESCRIPTORS: ACHING

## 2025-05-15 ASSESSMENT — PAIN SCALES - GENERAL
PAINLEVEL_OUTOF10: 3
PAINLEVEL_OUTOF10: 0
PAINLEVEL_OUTOF10: 10

## 2025-05-15 ASSESSMENT — PAIN DESCRIPTION - FREQUENCY: FREQUENCY: INTERMITTENT

## 2025-05-15 ASSESSMENT — PAIN DESCRIPTION - PAIN TYPE: TYPE: SURGICAL PAIN

## 2025-05-15 ASSESSMENT — PAIN DESCRIPTION - ONSET: ONSET: ON-GOING

## 2025-05-15 ASSESSMENT — PAIN DESCRIPTION - LOCATION: LOCATION: NECK

## 2025-05-15 NOTE — CONSULTS
[facility] Hospitalist Consult   Admit Date:  2025  4:59 AM   Name:  Shlomo uRdolph Jr.   Age:  70 y.o.  Sex:  male  :  1955   MRN:  [id number]   Room:  [facility]    Presenting/Chief Complaint: No chief complaint on file.    Reason(s) for Admission: Cervical myelopathy (HCC) [G95.9]  Myelopathic gait [R26.89]  DDD (degenerative disc disease), cervical [M50.30]     Hospitalists consulted by Behzad Webster* for: Uncontrolled hypertension postop    History of Presenting Illness:   Shlomo Rudolph Jr. is a 70 y.o. male with history of hypertension for which he takes lisinopril 20 mg daily unknown control, hyperlipidemia untreated, vitamin D deficiency treated with 5000 individual units daily, GERD, metabolic syndrome apparently no meds and he has a chronic smoker not interested presently in quitting.    He is postop cervical myelopathy-has known degenerative disc and joint disease.  Analgesia not ideal-reluctant to ask for pain meds by discussion with him.  Also likely hyper catecholamine state postop.  He received ACE inhibitor this morning and we have added clonidine as needed and will add additional fixed dose beta-blockade-monitor for bradycardia.  Has order for Trandate for accelerated hypertension but given rate limiting meds discussed change to as needed hydralazine if further meds needed.  He appears agreeable to plan of care.  He denies chest pain, dyspnea orthopnea palpitations.      Assessment & Plan:     Principal Problem:    Cervical myelopathy (HCC)  Plan: Postoperative day #1 surgical repair-primary orthopedic neurosurgical service  Active Problems:    ----Accelerated hypertension  5/15--continue fixed dose ACE inhibitor 20 mg lisinopril daily.  Add clonidine as needed, fixed dose 3 times daily metoprolol-monitor for bradycardia.  Change additional as needed medication to hydralazine.    --- Metabolic syndrome--prandial sliding scale medium dose coverage if needed-diabetic  (ROXICODONE) immediate release tablet 10 mg  10 mg Oral Q4H PRN    HYDROmorphone HCl PF (DILAUDID) injection 0.5 mg  0.5 mg IntraVENous Q3H PRN    diphenhydrAMINE (BENADRYL) capsule 25 mg  25 mg Oral Q6H PRN    Or    diphenhydrAMINE (BENADRYL) injection 25 mg  25 mg IntraVENous Q6H PRN    promethazine (PHENERGAN) tablet 12.5 mg  12.5 mg Oral Q6H PRN    Or    ondansetron (ZOFRAN) injection 4 mg  4 mg IntraVENous Q6H PRN    polyethylene glycol (GLYCOLAX) packet 17 g  17 g Oral Daily    bisacodyl (DULCOLAX) EC tablet 5 mg  5 mg Oral Daily    cyclobenzaprine (FLEXERIL) tablet 10 mg  10 mg Oral TID PRN    labetalol (NORMODYNE;TRANDATE) injection 10 mg  10 mg IntraVENous Q6H PRN       Signed:  Vishal Sotomayor DO    Part of this note may have been written by using a voice dictation software.  The note has been proof read but may still contain some grammatical/other typographical errors.

## 2025-05-15 NOTE — PROGRESS NOTES
ER[facility] End of Shift-Days For Spine Surgery Patient    1 Day Post-Op   Ideal body weight: 77.6 kg (171 lb 1.2 oz)    OOB POD#0 (within 6 hours of end anesthesia-if not contraindicated) Yes    ADULT DIET; Regular  ADULT ORAL NUTRITION SUPPLEMENT; AM Snack, PM Snack; Other Oral Supplement; Ensure Surgery    Tolerating Diet?: Yes    Ensure Surgery Immunonutrition Shakes - 2 per day (Starting POD 0)?  Yes    Ambulated in olivia 2 times.     Up to chair for 3 meals.    Lidocaine: No Discontinued POD#1 approx 0600 Yes    PRN Pain Medications Used?: No    IS Used 10x/hr while awake: Yes     DRAINS? Yes    Najera? No    BM?  No   Passing flatus? Yes    TEDs Yes    SCDs No         Signed By: Dione Cardozo RN     May 15, 2025

## 2025-05-15 NOTE — PROGRESS NOTES
ACUTE PHYSICAL THERAPY GOALS:   (Developed with and agreed upon by patient and/or caregiver.)  Pt will perform bed mobility with SBA while appropriately maintaining spinal precautions in 7 therapy sessions.  Pt will perform sit-to-stand/ stand-to-sit transfers SBA in 7 therapy sessions.  Pt will ambulate 150 ft SBA with use of LRAD/no device and breaks as needed in 7 therapy sessions.  Pt will tolerate 15 minutes of standing activity with LRAD/no device in 7 therapy sessions.  Pt will perform standing dynamic balance activities with minimal postural sway in 7 therapy sessions.  Pt will tolerate multiple sets and reps of BLE exercises in 7 therapy sessions.  Pt will accurately verbalize and demonstrate spinal precautions in 7 therapy sessions    PHYSICAL THERAPY: Daily Note AM   (Link to Caseload Tracking: PT Visit Days : 2  Time In/Out PT Charge Capture  Rehab Caseload Tracker  Orders    Shlomo Rudolph Jr. is a 70 y.o. male   PRIMARY DIAGNOSIS: Cervical myelopathy (HCC)  Cervical myelopathy (HCC) [G95.9]  Myelopathic gait [R26.89]  DDD (degenerative disc disease), cervical [M50.30]  Procedure(s) (LRB):  ERAS C3-C5  posterior cervical fusion with allograft and instrumentation and C2-C4 laminectomy (N/A)  1 Day Post-Op  Inpatient: Payor: [employer] MEDICARE / Plan: [employer] CHOICE-O MEDICARE / Product Type: *No Product type* /     ASSESSMENT:     REHAB RECOMMENDATIONS:   Recommendation to date pending progress:  Setting:  Inpatient Rehab Facility    Equipment:    To Be Determined     ASSESSMENT:  Mr. Rudolph is supine upon contact and agreeable to treatment.  He required additional cueing throughout treatment for safety awareness and demonstrated impulsive behavior several times.  The patient performed bed mobility with poor log roll technique despite cueing and demonstrated good static sitting balance.  He performed transfers with CGA/min A and ambulated in the hallway.  He is noted to have a very abnormal gait

## 2025-05-15 NOTE — PROGRESS NOTES
End of Shift-Night Spine Eras Patient  1 Day Post-Op   Ideal body weight: 77.6 kg (171 lb 1.2 oz)    OOB POD#0 (within 6 hours of end anesthesia-if not contraindicated) Yes    Ambulated in olivia 0 times.     Up to chair 2 times    Lidocaine: Yes Discontinued POD#1 approx 0600 Yes    PRN Pain Medications Used?: No    IS Used 10x/hr while awke: Yes     DRAINS? Yes    Najera? No    BM?  No   Passing flatus? Yes    TEDs Yes    SCDs No         Signed By: Judy Cote RN     May 15, 2025

## 2025-05-15 NOTE — OP NOTE
[facility] [address]   [phone]    OPERATIVE REPORT    Patient ID:Shlomo Rudolph Jr.  [id number]  1955  70 y.o.    DATE OF SURGERY: 5/14/2025    SURGEON: Behzad Webster MD    PREOPERATIVE DIAGNOSIS: Cervical stenosis with myelopathy    POSTOPERATIVE DIAGNOSIS: Same as above    PROCEDURES:  1. Cervical laminectomy C2, C3, C4 (CPT 12829, 63048 X 2).  2. Posterior cervical arthrodesis C3-C4 (CPT 21760)  3. Lateral mass instrumentation C3-C7 (CPT 60857)   4. Allograft (CPT 75184)    ANESTHESIA: General.    ESTIMATED BLOOD LOSS:  75 ml    INTRAOPERATIVE COMPLICATIONS: None.    INTRAOPERATIVE NEUROMONITORING: There were no significant, sustained changes in somatosensory-evoked potential monitoring.  MEPs also stable throughout the case    POSTOPERATIVE CONDITION: Stable.    IMPLANTS:   Implant Name Type Inv. Item Serial No.  Lot No. LRB No. Used Action   ALLOGRAFT BNE CHIP 1-4 MM 15 CC CRUSH [facility] - [id number]  ALLOGRAFT BNE CHIP 1-4 MM 15 CC CRUSH CAN [id number] [employer] [facility]  N/A 1 Implanted   PUTTY BIO DBM 10 ML W CHIPS - [id number]  PUTTY BIO DBM 10 ML W CHIPS  [employer]- [id number] N/A 1 Implanted   SCREW SPINE SET LKING CAP - [id number]  SCREW SPINE SET LKING CAP  [employer] HOW-WD [id number] N/A 4 Implanted   SCREW SPINE YUKON [id number] - [id number]  SCREW SPINE YUKON [id number]  [employer] Here-WD [id number] N/A 2 Implanted   SCREW SPNL POLYAX 3.5X16 MM [employer]UK[employer] OCT - [id number]  SCREW SPNL POLYAX 3.5X16 MM YUKON OCT  [employer] SPINE HOW-WD [id number] N/A 2 Implanted   SOLIS SPNL CONTOURED 3.5X25 MM [employer]UK[employer] OCT - [id number]  SOLIS SPNL CONTOURED 3.5X25 MM YUKON OCT  [employer] SPINE HOW-WD [id number] N/A 2 Implanted         INDICATIONS FOR PROCEDURE: The patient was felt to have evidence of cervical myelopathy by history and physical exam.  This was also felt to be progressively progressive with the  control  Okay to remove collar for meals and hygiene  Monitor drain output  PT/OT assessment when able  Postoperative antibiotics for 24 hours    Discussed everything with the patient's daughter postoperatively in the patient waiting area.  All questions answered.

## 2025-05-15 NOTE — PROGRESS NOTES
ACUTE OCCUPATIONAL THERAPY GOALS:   (Developed with and agreed upon by patient and/or caregiver.)  1. Pt will complete LB ADL I with AE as needed.   2. Pt will complete toileting I with AE as needed.   3. Pt will complete UB ADL I.  4. Pt will tolerate 25 minutes of OT treatment requiring 1-2 breaks as needed.   5. Pt will complete grooming tasks while standing at sink Mod I.  6. Pt will complete functional mobility and/or transfers via RW Mod I.   7. Pt will tolerate BUE exercises to increase strength/endurance for safe, functional transfers and/or functional mobility, and ADL participation.   8. Pt will verbalize and/or demonstrate understanding of cervical spine precautions during functional activity 100% of the time.      Timeframe: 7 visits      OCCUPATIONAL THERAPY Initial Assessment, Daily Note, and AM       OT Visit Days: 1  Acknowledge Orders  Time  OT Charge Capture  Rehab Caseload Tracker      Shlomo Rudolph Jr. is a 70 y.o. male   PRIMARY DIAGNOSIS: Cervical myelopathy (HCC)  Cervical myelopathy (HCC) [G95.9]  Myelopathic gait [R26.89]  DDD (degenerative disc disease), cervical [M50.30]  Procedure(s) (LRB):  ERAS C3-C5  posterior cervical fusion with allograft and instrumentation and C2-C4 laminectomy (N/A)  1 Day Post-Op  Reason for Referral: Generalized Muscle Weakness (M62.81)  Inpatient: Payor: [employer] MEDICARE / Plan: [employer] CHOICE-PPO MEDICARE / Product Type: *No Product type* /     ASSESSMENT:     REHAB RECOMMENDATIONS:   Recommendation to date pending progress:  Setting:  Inpatient Rehab Facility     Equipment:    To Be Determined     ASSESSMENT:  Mr. Rudolph who was admitted for condition/procedure above. At functional baseline, pt is Mod I RW for functional mobility and I/ADLs. Pt lives alone.     Pt was received sitting in chair with PTA resting from ambulating down hallway. Pt required assistance for functional mobility and ADLs today due to    Cervical spine precautions  Scissoring

## 2025-05-15 NOTE — PROGRESS NOTES
ORTHO PROGRESS NOTE    May 15, 2025    Admit Date: 2025  Post Op day: 1 Day Post-Op      Subjective:     Shlomo Rudolph Jr. is a patient who is now 1 Day Post-Op  and has no complaints.       Objective:     PT/OT:   Progressing    Vital Signs:    Patient Vitals for the past 8 hrs:   BP Temp Temp src Pulse Resp SpO2   05/15/25 1110 (!) 141/76 -- -- 75 -- --   05/15/25 0957 (!) 175/77 -- -- 99 -- --   05/15/25 0745 (!) 193/86 97.9 °F (36.6 °C) Oral 93 20 95 %     Temp (24hrs), Av.7 °F (36.5 °C), Min:97.3 °F (36.3 °C), Max:98.1 °F (36.7 °C)      LAB:    No results for input(s): \"HGB\", \"WBC\", \"PLT\" in the last 72 hours.    Physical Exam:    Awake and in no acute distress.  Mood and affect appropriate.  Respirations unlabored and no evidence cyanosis.  Calves nontender.  Abdomen soft and nontender.  Dressing clean/dry  No new neurologic deficit.    Assessment:      1 Day Post-Op STATUS POST Procedure(s):  ERAS C3-C5  posterior cervical fusion with allograft and instrumentation and C2-C4 laminectomy      Plan:     -Monitor drain output  - PT OT    Anticipate discharge to: Inpatient Rehab      Signed By: Tree Almanzar MD

## 2025-05-15 NOTE — PROGRESS NOTES
ACUTE PHYSICAL THERAPY GOALS:   (Developed with and agreed upon by patient and/or caregiver.)  Pt will perform bed mobility with SBA while appropriately maintaining spinal precautions in 7 therapy sessions.  Pt will perform sit-to-stand/ stand-to-sit transfers SBA in 7 therapy sessions.  Pt will ambulate 150 ft SBA with use of LRAD/no device and breaks as needed in 7 therapy sessions.  Pt will tolerate 15 minutes of standing activity with LRAD/no device in 7 therapy sessions.  Pt will perform standing dynamic balance activities with minimal postural sway in 7 therapy sessions.  Pt will tolerate multiple sets and reps of BLE exercises in 7 therapy sessions.  Pt will accurately verbalize and demonstrate spinal precautions in 7 therapy sessions    PHYSICAL THERAPY: Daily Note PM   (Link to Caseload Tracking: PT Visit Days : 2  Time In/Out PT Charge Capture  Rehab Caseload Tracker  Orders    Shlomo Rudolph Jr. is a 70 y.o. male   PRIMARY DIAGNOSIS: Cervical myelopathy (HCC)  Cervical myelopathy (HCC) [G95.9]  Myelopathic gait [R26.89]  DDD (degenerative disc disease), cervical [M50.30]  Procedure(s) (LRB):  ERAS C3-C5  posterior cervical fusion with allograft and instrumentation and C2-C4 laminectomy (N/A)  1 Day Post-Op  Inpatient: Payor: [employer] MEDICARE / Plan: [employer] CHOICE-O MEDICARE / Product Type: *No Product type* /     ASSESSMENT:     REHAB RECOMMENDATIONS:   Recommendation to date pending progress:  Setting:  Inpatient Rehab Facility    Equipment:    To Be Determined     ASSESSMENT:  Mr. Rudolph is supine upon contact and agreeable to treatment.  He required additional cueing throughout treatment for safety awareness and demonstrated impulsive behavior several times.  The patient performed bed mobility with poor log roll technique despite cueing and demonstrated good static sitting balance.  He performed transfers with CGA/min A and ambulated in the hallway.  He is noted to have a very abnormal gait

## 2025-05-16 LAB
ANION GAP SERPL CALC-SCNC: 8 MMOL/L (ref 7–16)
APPEARANCE UR: CLEAR
BACTERIA URNS QL MICRO: NEGATIVE /HPF
BASOPHILS # BLD: 0.05 K/UL (ref 0–0.2)
BASOPHILS NFR BLD: 0.4 % (ref 0–2)
BILIRUB UR QL: NEGATIVE
BUN SERPL-MCNC: 17 MG/DL (ref 8–23)
CALCIUM SERPL-MCNC: 8.7 MG/DL (ref 8.8–10.2)
CHLORIDE SERPL-SCNC: 107 MMOL/L (ref 98–107)
CO2 SERPL-SCNC: 24 MMOL/L (ref 20–29)
COLOR UR: ABNORMAL
CREAT SERPL-MCNC: 0.97 MG/DL (ref 0.8–1.3)
DIFFERENTIAL METHOD BLD: ABNORMAL
EOSINOPHIL # BLD: 0.11 K/UL (ref 0–0.8)
EOSINOPHIL NFR BLD: 0.9 % (ref 0.5–7.8)
EPI CELLS #/AREA URNS HPF: ABNORMAL /HPF
ERYTHROCYTE [DISTWIDTH] IN BLOOD BY AUTOMATED COUNT: 13.6 % (ref 11.9–14.6)
GLUCOSE BLD STRIP.AUTO-MCNC: 105 MG/DL (ref 65–100)
GLUCOSE BLD STRIP.AUTO-MCNC: 106 MG/DL (ref 65–100)
GLUCOSE BLD STRIP.AUTO-MCNC: 123 MG/DL (ref 65–100)
GLUCOSE BLD STRIP.AUTO-MCNC: 141 MG/DL (ref 65–100)
GLUCOSE SERPL-MCNC: 99 MG/DL (ref 70–99)
GLUCOSE UR STRIP.AUTO-MCNC: NEGATIVE MG/DL
HCT VFR BLD AUTO: 37.2 % (ref 41.1–50.3)
HGB BLD-MCNC: 12.4 G/DL (ref 13.6–17.2)
HGB UR QL STRIP: NEGATIVE
HYALINE CASTS URNS QL MICRO: ABNORMAL /LPF
IMM GRANULOCYTES # BLD AUTO: 0.03 K/UL (ref 0–0.5)
IMM GRANULOCYTES NFR BLD AUTO: 0.3 % (ref 0–5)
KETONES UR QL STRIP.AUTO: NEGATIVE MG/DL
LEUKOCYTE ESTERASE UR QL STRIP.AUTO: ABNORMAL
LYMPHOCYTES # BLD: 2.59 K/UL (ref 0.5–4.6)
LYMPHOCYTES NFR BLD: 21.6 % (ref 13–44)
MCH RBC QN AUTO: 28.6 PG (ref 26.1–32.9)
MCHC RBC AUTO-ENTMCNC: 33.3 G/DL (ref 31.4–35)
MCV RBC AUTO: 85.9 FL (ref 82–102)
MONOCYTES # BLD: 1.08 K/UL (ref 0.1–1.3)
MONOCYTES NFR BLD: 9 % (ref 4–12)
MUCOUS THREADS URNS QL MICRO: 0 /LPF
NEUTS SEG # BLD: 8.12 K/UL (ref 1.7–8.2)
NEUTS SEG NFR BLD: 67.8 % (ref 43–78)
NITRITE UR QL STRIP.AUTO: NEGATIVE
NRBC # BLD: 0 K/UL (ref 0–0.2)
PH UR STRIP: 6.5 (ref 5–9)
PLATELET # BLD AUTO: 216 K/UL (ref 150–450)
PMV BLD AUTO: 10.2 FL (ref 9.4–12.3)
POTASSIUM SERPL-SCNC: 4 MMOL/L (ref 3.5–5.1)
PROT UR STRIP-MCNC: NEGATIVE MG/DL
RBC # BLD AUTO: 4.33 M/UL (ref 4.23–5.6)
RBC #/AREA URNS HPF: ABNORMAL /HPF
SERVICE CMNT-IMP: ABNORMAL
SODIUM SERPL-SCNC: 139 MMOL/L (ref 136–145)
SP GR UR REFRACTOMETRY: 1.01 (ref 1–1.02)
TSH W FREE THYROID IF ABNORMAL: 0.54 UIU/ML (ref 0.27–4.2)
URINE CULTURE IF INDICATED: ABNORMAL
UROBILINOGEN UR QL STRIP.AUTO: 0.2 EU/DL (ref 0.2–1)
WBC # BLD AUTO: 12 K/UL (ref 4.3–11.1)
WBC URNS QL MICRO: ABNORMAL /HPF

## 2025-05-16 PROCEDURE — 6370000000 HC RX 637 (ALT 250 FOR IP): Performed by: ORTHOPAEDIC SURGERY

## 2025-05-16 PROCEDURE — 97530 THERAPEUTIC ACTIVITIES: CPT

## 2025-05-16 PROCEDURE — 87086 URINE CULTURE/COLONY COUNT: CPT

## 2025-05-16 PROCEDURE — 85025 COMPLETE CBC W/AUTO DIFF WBC: CPT

## 2025-05-16 PROCEDURE — 80048 BASIC METABOLIC PNL TOTAL CA: CPT

## 2025-05-16 PROCEDURE — 1100000000 HC RM PRIVATE

## 2025-05-16 PROCEDURE — 97116 GAIT TRAINING THERAPY: CPT

## 2025-05-16 PROCEDURE — 36415 COLL VENOUS BLD VENIPUNCTURE: CPT

## 2025-05-16 PROCEDURE — 99222 1ST HOSP IP/OBS MODERATE 55: CPT | Performed by: STUDENT IN AN ORGANIZED HEALTH CARE EDUCATION/TRAINING PROGRAM

## 2025-05-16 PROCEDURE — 2500000003 HC RX 250 WO HCPCS: Performed by: INTERNAL MEDICINE

## 2025-05-16 PROCEDURE — 6370000000 HC RX 637 (ALT 250 FOR IP): Performed by: INTERNAL MEDICINE

## 2025-05-16 PROCEDURE — 6360000002 HC RX W HCPCS: Performed by: INTERNAL MEDICINE

## 2025-05-16 PROCEDURE — 82962 GLUCOSE BLOOD TEST: CPT

## 2025-05-16 PROCEDURE — 84443 ASSAY THYROID STIM HORMONE: CPT

## 2025-05-16 PROCEDURE — 2500000003 HC RX 250 WO HCPCS: Performed by: ORTHOPAEDIC SURGERY

## 2025-05-16 PROCEDURE — 81001 URINALYSIS AUTO W/SCOPE: CPT

## 2025-05-16 PROCEDURE — 97535 SELF CARE MNGMENT TRAINING: CPT

## 2025-05-16 RX ORDER — METOPROLOL TARTRATE 50 MG
50 TABLET ORAL 2 TIMES DAILY
Status: DISCONTINUED | OUTPATIENT
Start: 2025-05-16 | End: 2025-05-22 | Stop reason: HOSPADM

## 2025-05-16 RX ADMIN — CLONIDINE HYDROCHLORIDE 0.1 MG: 0.2 TABLET ORAL at 08:25

## 2025-05-16 RX ADMIN — METOPROLOL TARTRATE 50 MG: 50 TABLET, FILM COATED ORAL at 21:44

## 2025-05-16 RX ADMIN — ACETAMINOPHEN 650 MG: 325 TABLET ORAL at 21:44

## 2025-05-16 RX ADMIN — BISACODYL 5 MG: 5 TABLET, COATED ORAL at 08:18

## 2025-05-16 RX ADMIN — CHOLECALCIFEROL TAB 125 MCG (5000 UNIT) 5000 UNITS: 125 TAB at 08:18

## 2025-05-16 RX ADMIN — METOPROLOL TARTRATE 50 MG: 50 TABLET, FILM COATED ORAL at 08:18

## 2025-05-16 RX ADMIN — ACETAMINOPHEN 650 MG: 325 TABLET ORAL at 05:45

## 2025-05-16 RX ADMIN — SODIUM CHLORIDE, PRESERVATIVE FREE 10 ML: 5 INJECTION INTRAVENOUS at 08:27

## 2025-05-16 RX ADMIN — CLONIDINE HYDROCHLORIDE 0.1 MG: 0.2 TABLET ORAL at 22:28

## 2025-05-16 RX ADMIN — OXYCODONE 10 MG: 5 TABLET ORAL at 08:25

## 2025-05-16 RX ADMIN — PANTOPRAZOLE SODIUM 40 MG: 40 TABLET, DELAYED RELEASE ORAL at 05:46

## 2025-05-16 RX ADMIN — SODIUM CHLORIDE, PRESERVATIVE FREE 10 ML: 5 INJECTION INTRAVENOUS at 21:45

## 2025-05-16 RX ADMIN — WATER 1000 MG: 1 INJECTION INTRAMUSCULAR; INTRAVENOUS; SUBCUTANEOUS at 15:27

## 2025-05-16 RX ADMIN — CLONIDINE HYDROCHLORIDE 0.1 MG: 0.2 TABLET ORAL at 16:27

## 2025-05-16 RX ADMIN — POLYETHYLENE GLYCOL 3350 17 G: 17 POWDER, FOR SOLUTION ORAL at 08:19

## 2025-05-16 RX ADMIN — ACETAMINOPHEN 650 MG: 325 TABLET ORAL at 15:27

## 2025-05-16 RX ADMIN — ACETAMINOPHEN 650 MG: 325 TABLET ORAL at 08:18

## 2025-05-16 RX ADMIN — LISINOPRIL 20 MG: 20 TABLET ORAL at 08:18

## 2025-05-16 RX ADMIN — OXYCODONE 10 MG: 5 TABLET ORAL at 22:28

## 2025-05-16 ASSESSMENT — PAIN SCALES - GENERAL
PAINLEVEL_OUTOF10: 10
PAINLEVEL_OUTOF10: 2
PAINLEVEL_OUTOF10: 0
PAINLEVEL_OUTOF10: 10

## 2025-05-16 ASSESSMENT — PAIN DESCRIPTION - DESCRIPTORS
DESCRIPTORS: ACHING
DESCRIPTORS: ACHING

## 2025-05-16 ASSESSMENT — PAIN DESCRIPTION - LOCATION
LOCATION: NECK
LOCATION: NECK

## 2025-05-16 ASSESSMENT — PAIN DESCRIPTION - ONSET
ONSET: GRADUAL
ONSET: GRADUAL

## 2025-05-16 ASSESSMENT — PAIN DESCRIPTION - FREQUENCY
FREQUENCY: INTERMITTENT
FREQUENCY: INTERMITTENT

## 2025-05-16 ASSESSMENT — PAIN DESCRIPTION - ORIENTATION
ORIENTATION: POSTERIOR
ORIENTATION: POSTERIOR

## 2025-05-16 ASSESSMENT — PAIN DESCRIPTION - PAIN TYPE
TYPE: SURGICAL PAIN
TYPE: SURGICAL PAIN

## 2025-05-16 ASSESSMENT — PAIN - FUNCTIONAL ASSESSMENT: PAIN_FUNCTIONAL_ASSESSMENT: PREVENTS OR INTERFERES SOME ACTIVE ACTIVITIES AND ADLS

## 2025-05-16 NOTE — PROGRESS NOTES
ACUTE OCCUPATIONAL THERAPY GOALS:   (Developed with and agreed upon by patient and/or caregiver.)    1. Pt will complete LB ADL I with AE as needed.   2. Pt will complete toileting I with AE as needed.   3. Pt will complete UB ADL I.  4. Pt will tolerate 25 minutes of OT treatment requiring 1-2 breaks as needed.   5. Pt will complete grooming tasks while standing at sink Mod I.  6. Pt will complete functional mobility and/or transfers via RW Mod I.   7. Pt will tolerate BUE exercises to increase strength/endurance for safe, functional transfers and/or functional mobility, and ADL participation.   8. Pt will verbalize and/or demonstrate understanding of cervical spine precautions during functional activity 100% of the time.       Timeframe: 7 visits  OCCUPATIONAL THERAPY: Daily Note AM   OT Visit Days: 2   Time In/Out  OT Charge Capture  Rehab Caseload Tracker  OT Orders    Shlomo Rudolph Jr. is a 70 y.o. male   PRIMARY DIAGNOSIS: Cervical myelopathy (HCC)  Cervical myelopathy (HCC) [G95.9]  Myelopathic gait [R26.89]  DDD (degenerative disc disease), cervical [M50.30]  Procedure(s) (LRB):  ERAS C3-C5  posterior cervical fusion with allograft and instrumentation and C2-C4 laminectomy (N/A)  2 Days Post-Op  Inpatient: Payor: [employer] MEDICARE / Plan: [employer] CHOICE-PPO MEDICARE / Product Type: *No Product type* /     ASSESSMENT:     REHAB RECOMMENDATIONS:   Recommendation to date pending progress:  Setting:  Inpatient Rehab Facility     Equipment:    To Be Determined     ASSESSMENT:  Mr. Rudolph presents in supine upon arrival. Pt transferred to sitting with SBA and additional time. Pt completed sit to stand with CGA/min a and a rolling walker and completed functional mobility to the sink with CGA/min a. Pt stood and completed grooming, hygiene, and UB bathing with CGA. Pt completed functional mobility in the hallway with CGA/min a exceeding HH distances with one seated rest break. Pt was left up in the chair  Assistance, NT=Not Tested    BALANCE: Good Fair+ Fair Fair- Poor NT Comments   Sitting Static [] [x] [] [] [] []    Sitting Dynamic [] [x] [] [] [] []              Standing Static [] [] [x] [] [] []    Standing Dynamic [] [] [x] [] [] []        PLAN:     FREQUENCY/DURATION   OT Plan of Care: 4 times/week for duration of hospital stay or until stated goals are met, whichever comes first.    TREATMENT:     TREATMENT:   Co-Treatment between OT & PT necessary due to patient's decreased overall endurance/tolerance levels, as well as need for high level skilled assistance to complete functional transfers/mobility and functional tasks  Therapeutic Activity (15 Minutes): Patient participated in therapeutic activities including bed mobility training, functional transfer training, functional mobility of household distances, and standing tolerance activity with minimal assistance, verbal cues, and education in order to increase independence, increase safety awareness, increase activity tolerance, and increase coordination.   Self Care (14 minutes): Patient participated in upper body bathing, grooming, functional mobility, bed mobility, functional transfer, and compensatory technique in supported sitting and standing with minimal verbal, manual, and tactile cueing to increase independence, decrease assistance required, increase activity tolerance, increase safety awareness, and maintain precautions. The patient was educated on role of occupational therapy, compensatory technique during ADL , strategies to improve safety, transfer training and safety, and surgical precautions and patient verbalized understanding and will need reinforcement at next session.     TREATMENT GRID:  N/A    AFTER TREATMENT PRECAUTIONS: Alarm Activated, Bed/Chair Locked, Call light within reach, Chair, Needs within reach, and RN notified    INTERDISCIPLINARY COLLABORATION:  RN/ PCT, PT/ PTA, and OT/ AARON    EDUCATION:  OT educated patient  on role of

## 2025-05-16 NOTE — CONSULTS
[facility]  Inpatient Rehab Consult        Admission Date: 5/14/2025  Primary Care Provider: Mariam Coppola APRN - CNP  Specialty Group / Referring Service: Orthopedic surgeon      Chief Complaint : Left arm and left lower extremity weakness  Admitting Diagnosis:   Cervical myelopathy (HCC) [G95.9]  Myelopathic gait [R26.89]  DDD (degenerative disc disease), cervical [M50.30]    Principal Problem:    Cervical myelopathy (HCC)  Active Problems:    Accelerated essential hypertension    Vitamin D deficiency    Prediabetes    Gastroesophageal reflux disease    Myelopathic gait    DDD (degenerative disc disease), cervical  Resolved Problems:    * No resolved hospital problems. *      Acute Rehab Diagnoses:  Encounter for rehabilitation [Z51.89]   Abnormality of gait and mobility [R26.9]  Decreased independence for activities of daily living (ADL) [Z78.9]  Physical debility / deconditioning [R53.81]      Medical Dx:  Past Medical History:   Diagnosis Date    Altered gait     Arthritis     CAD (coronary artery disease)     pt denies    GERD (gastroesophageal reflux disease)     occ PRN medications    History of illicit drug use     patient denies current drug use  11/24/20    Hypertension     pt denies     Kidney stone     Mitral valve regurgitation 06/08/2011    1+    PUD (peptic ulcer disease)     1985    Smoker     approx one pack daily - 35 years        Date of Evaluation: May 16, 2025    Subjective       HPI: Shlomo Rudolph Jr. is a 70 y.o. male patient who presented to [facility] on 5/14/2025 secondary to cervical myelopathy, underwent posterior fusion cervical spine electively.  Patient with a past medical history of hypertension, hyperlipidemia, GERD.  Postoperatively he was noted to have elevated blood pressure requiring multiple medications for control. Due to ongoing functional deficits, PM&R consulted to evaluate patient's rehabilitation and ongoing medical needs to  determine best suited placement for patient once discharged from acute care.       Patient seen at bedside this morning.  He reports ongoing dysesthesias in left upper extremity, do not appear better after surgery.  He does report left lower extremity weakness does seem significantly improved, although still weak.  We discussed left upper extremity weakness.  Goals and expectations of inpatient rehabilitation were discussed.  All questions and concerns addressed.    Current Functional deficits: gait instability, fatigue, dysesthesias        Prior Home Situation: Lives alone, reporting family \"too busy to help\" at discharge      Previous Level of Function / Work / Activity: Independent?  With all mobility and ADLs      Current Level of Function / Work / Activity: Contact-guard assistance for ambulating 6 feet, contact-guard assistance for mobility.  Minimal assistance required for lower body dressing and grooming      PATIENT'S STATED REHABILITATION GOALS: improve strength, mobility          Past Medical History:   Diagnosis Date    Altered gait     Arthritis     CAD (coronary artery disease)     pt denies    GERD (gastroesophageal reflux disease)     occ PRN medications    History of illicit drug use     patient denies current drug use  11/24/20    Hypertension     pt denies     Kidney stone     Mitral valve regurgitation 06/08/2011    1+    PUD (peptic ulcer disease)     1985    Smoker     approx one pack daily - 35 years       Past Surgical History:   Procedure Laterality Date    CERVICAL FUSION N/A 5/14/2025    ERAS C3-C5  posterior cervical fusion with allograft and instrumentation and C2-C4 laminectomy performed by Behzad Webster Jr., MD at [facility] MAIN OR    COLONOSCOPY      ORTHOPEDIC SURGERY  1998    cervical spine fusion, dr mejia     TOTAL HIP ARTHROPLASTY Left 2007    TOTAL HIP ARTHROPLASTY Right 2009    Dr pedro    UROLOGICAL SURGERY      kidney stone removal - states doesn't know if was litho or

## 2025-05-16 NOTE — PROGRESS NOTES
Physical Therapy Note:    Attempted to see patient this PM for physical therapy treatment  session. Patient is back to bed and states he is too worn out from the chair to participate right now. Will follow and re-attempt as schedule permits/patient available. Thank you,    Maite Alamo, [facility]     Rehab Caseload Tracker

## 2025-05-16 NOTE — PROGRESS NOTES
ACUTE PHYSICAL THERAPY GOALS:   (Developed with and agreed upon by patient and/or caregiver.)  Pt will perform bed mobility with SBA while appropriately maintaining spinal precautions in 7 therapy sessions.  Pt will perform sit-to-stand/ stand-to-sit transfers SBA in 7 therapy sessions.  Pt will ambulate 150 ft SBA with use of LRAD/no device and breaks as needed in 7 therapy sessions.  Pt will tolerate 15 minutes of standing activity with LRAD/no device in 7 therapy sessions.  Pt will perform standing dynamic balance activities with minimal postural sway in 7 therapy sessions.  Pt will tolerate multiple sets and reps of BLE exercises in 7 therapy sessions.  Pt will accurately verbalize and demonstrate spinal precautions in 7 therapy sessions    PHYSICAL THERAPY: Daily Note AM   (Link to Caseload Tracking: PT Visit Days : 3  Time In/Out PT Charge Capture  Rehab Caseload Tracker  Orders    Shlomo Rudolph Jr. is a 70 y.o. male   PRIMARY DIAGNOSIS: Cervical myelopathy (HCC)  Cervical myelopathy (HCC) [G95.9]  Myelopathic gait [R26.89]  DDD (degenerative disc disease), cervical [M50.30]  Procedure(s) (LRB):  ERAS C3-C5  posterior cervical fusion with allograft and instrumentation and C2-C4 laminectomy (N/A)  2 Days Post-Op  Inpatient: Payor: [employer] MEDICARE / Plan: [employer] CHOICE-O MEDICARE / Product Type: *No Product type* /     ASSESSMENT:     REHAB RECOMMENDATIONS:   Recommendation to date pending progress:  Setting:  Inpatient Rehab Facility    Equipment:    To Be Determined     ASSESSMENT:  Mr. Rudolph continues to make slow progress toward goals with increased gait distances.  He requires additional cueing throughout for spinal precautions and safety awareness.  The patient performed bed mobility with log roll after much cueing.  He demonstrated fair + sitting balance, but does throw himself back to pull his legs up and fix his socks.  The patient performed transfers and ambulation with min/CGA for all

## 2025-05-16 NOTE — PROGRESS NOTES
Hospitalist Progress Note   Admit Date:  2025  4:59 AM   Name:  Shlomo Rudolph Jr.   Age:  70 y.o.  Sex:  male  :  1955   MRN:  [id number]   Room:  [facility]    Presenting/Chief Complaint: No chief complaint on file.     Reason(s) for Admission: Cervical myelopathy (HCC) [G95.9]  Myelopathic gait [R26.89]  DDD (degenerative disc disease), cervical [M50.30]     Hospital Course:   Copied from consultation HPI:  Shlomo Rudolph Jr. is a 70 y.o. male with history of hypertension for which he takes lisinopril 20 mg daily unknown control, hyperlipidemia untreated, vitamin D deficiency treated with 5000 individual units daily, GERD, metabolic syndrome apparently no meds and he has a chronic smoker not interested presently in quitting.     He is postop cervical myelopathy-has known degenerative disc and joint disease.  Analgesia not ideal-reluctant to ask for pain meds by discussion with him.  Also likely hyper catecholamine state postop.  He received ACE inhibitor this morning and we have added clonidine as needed and will add additional fixed dose beta-blockade-monitor for bradycardia.  Has order for Trandate for accelerated hypertension but given rate limiting meds discussed change to as needed hydralazine if further meds needed.  He appears agreeable to plan of care.  He denies chest pain, dyspnea orthopnea palpitations.     Subjective & 24hr Events:     BP improved today-as part of workup for hypertension I ordered urinalysis and incidental finding of significant pyuria and pending culture result empiric ceftriaxone to start regarding postop-no evidence of systemic inflammatory response syndrome to suggest bacteremia.  Rapid de-escalation to oral antibiotics pending culture result.  TSH pending regarding new diagnosis of hypertension-accelerated.  Improved with beta-blockade in addition to home fixed dose lisinopril.  No evidence of hematuria to suggest any nephritis.  Metabolic syndrome but

## 2025-05-16 NOTE — PROGRESS NOTES
ORTHO PROGRESS NOTE    May 16, 2025    Admit Date: 2025  Post Op day: 2 Days Post-Op      Subjective:     Shlomo Rudolph Jr. is a patient who is now 2 Days Post-Op from posterior cervical fusion at C3-4 with C2-4 laminectomy.  He has done well.  He is complaining of some left shoulder pain today but has been up and walking with his walker.  Physical therapy is recommending an inpatient rehab stay to maximize his functional outcome.      Objective:     PT/OT:   Progressing    Vital Signs:    Patient Vitals for the past 8 hrs:   BP Temp Temp src Pulse Resp SpO2   25 0928 135/70 -- -- 53 -- --   25 0813 (!) 180/103 97.7 °F (36.5 °C) Oral 66 18 96 %   25 0541 (!) 176/90 98.6 °F (37 °C) Oral 77 18 95 %     Temp (24hrs), Av.2 °F (36.8 °C), Min:97.7 °F (36.5 °C), Max:98.6 °F (37 °C)      LAB:    Recent Labs     25  0804   HGB 12.4*   WBC 12.0*          Physical Exam:    Awake and in no acute distress.  Mood and affect appropriate.  Respirations unlabored and no evidence cyanosis.  Calves nontender.  Abdomen soft and nontender.  Dressing clean/dry, cervical collar in place    Strength testing in the upper extremity reveals the following based on the 5 point grading scale:       Delt(C5) Bicep(C6) WE(C6) Tricep (C7) WF(C7) (C8) Int (T1)   Right 5 5 5 5 5 5 5   Left 5 5 5 5 5 5 5     Lower Extremities:     HF (L2) KE (L3/4) ADF (L4) EHL (L5) APF (S1)   Right 5 5 5 5 5   Left 5 5 5 5 5     2-3 beats of clonus at the ankles bilaterally         Assessment:      2 Days Post-Op STATUS POST Procedure(s):  ERAS C3-C5  posterior cervical fusion with allograft and instrumentation and C2-C4 laminectomy      Plan:     Continue with PT/OT, appreciate case management assistance  Pain control as needed.  Discussed with the patient that given his reassuring strength and no deficits, I feel that his left arm pain is likely transient and may or may not be related to intraoperative taping of

## 2025-05-17 LAB
ANION GAP SERPL CALC-SCNC: 8 MMOL/L (ref 7–16)
BASOPHILS # BLD: 0.05 K/UL (ref 0–0.2)
BASOPHILS NFR BLD: 0.5 % (ref 0–2)
BUN SERPL-MCNC: 16 MG/DL (ref 8–23)
CALCIUM SERPL-MCNC: 8.9 MG/DL (ref 8.8–10.2)
CHLORIDE SERPL-SCNC: 105 MMOL/L (ref 98–107)
CO2 SERPL-SCNC: 25 MMOL/L (ref 20–29)
CREAT SERPL-MCNC: 0.95 MG/DL (ref 0.8–1.3)
DIFFERENTIAL METHOD BLD: ABNORMAL
EOSINOPHIL # BLD: 0.18 K/UL (ref 0–0.8)
EOSINOPHIL NFR BLD: 1.8 % (ref 0.5–7.8)
ERYTHROCYTE [DISTWIDTH] IN BLOOD BY AUTOMATED COUNT: 13.4 % (ref 11.9–14.6)
GLUCOSE BLD STRIP.AUTO-MCNC: 121 MG/DL (ref 65–100)
GLUCOSE BLD STRIP.AUTO-MCNC: 135 MG/DL (ref 65–100)
GLUCOSE BLD STRIP.AUTO-MCNC: 143 MG/DL (ref 65–100)
GLUCOSE BLD STRIP.AUTO-MCNC: 95 MG/DL (ref 65–100)
GLUCOSE SERPL-MCNC: 99 MG/DL (ref 70–99)
HCT VFR BLD AUTO: 40.5 % (ref 41.1–50.3)
HGB BLD-MCNC: 13.3 G/DL (ref 13.6–17.2)
IMM GRANULOCYTES # BLD AUTO: 0.03 K/UL (ref 0–0.5)
IMM GRANULOCYTES NFR BLD AUTO: 0.3 % (ref 0–5)
LYMPHOCYTES # BLD: 2.6 K/UL (ref 0.5–4.6)
LYMPHOCYTES NFR BLD: 26.7 % (ref 13–44)
MCH RBC QN AUTO: 29.1 PG (ref 26.1–32.9)
MCHC RBC AUTO-ENTMCNC: 32.8 G/DL (ref 31.4–35)
MCV RBC AUTO: 88.6 FL (ref 82–102)
MONOCYTES # BLD: 1.14 K/UL (ref 0.1–1.3)
MONOCYTES NFR BLD: 11.7 % (ref 4–12)
NEUTS SEG # BLD: 5.75 K/UL (ref 1.7–8.2)
NEUTS SEG NFR BLD: 59 % (ref 43–78)
NRBC # BLD: 0 K/UL (ref 0–0.2)
PLATELET # BLD AUTO: 219 K/UL (ref 150–450)
PMV BLD AUTO: 10.1 FL (ref 9.4–12.3)
POTASSIUM SERPL-SCNC: 4.3 MMOL/L (ref 3.5–5.1)
RBC # BLD AUTO: 4.57 M/UL (ref 4.23–5.6)
SERVICE CMNT-IMP: ABNORMAL
SERVICE CMNT-IMP: NORMAL
SODIUM SERPL-SCNC: 138 MMOL/L (ref 136–145)
WBC # BLD AUTO: 9.8 K/UL (ref 4.3–11.1)

## 2025-05-17 PROCEDURE — 2500000003 HC RX 250 WO HCPCS: Performed by: ORTHOPAEDIC SURGERY

## 2025-05-17 PROCEDURE — 2500000003 HC RX 250 WO HCPCS: Performed by: INTERNAL MEDICINE

## 2025-05-17 PROCEDURE — 1100000000 HC RM PRIVATE

## 2025-05-17 PROCEDURE — 82962 GLUCOSE BLOOD TEST: CPT

## 2025-05-17 PROCEDURE — 36415 COLL VENOUS BLD VENIPUNCTURE: CPT

## 2025-05-17 PROCEDURE — 6370000000 HC RX 637 (ALT 250 FOR IP): Performed by: INTERNAL MEDICINE

## 2025-05-17 PROCEDURE — 85025 COMPLETE CBC W/AUTO DIFF WBC: CPT

## 2025-05-17 PROCEDURE — 6370000000 HC RX 637 (ALT 250 FOR IP): Performed by: ORTHOPAEDIC SURGERY

## 2025-05-17 PROCEDURE — 97116 GAIT TRAINING THERAPY: CPT

## 2025-05-17 PROCEDURE — 6360000002 HC RX W HCPCS: Performed by: INTERNAL MEDICINE

## 2025-05-17 PROCEDURE — 80048 BASIC METABOLIC PNL TOTAL CA: CPT

## 2025-05-17 RX ORDER — LISINOPRIL 20 MG/1
40 TABLET ORAL DAILY
Status: DISCONTINUED | OUTPATIENT
Start: 2025-05-17 | End: 2025-05-22 | Stop reason: HOSPADM

## 2025-05-17 RX ORDER — POTASSIUM CHLORIDE 750 MG/1
10 TABLET, EXTENDED RELEASE ORAL
Status: DISCONTINUED | OUTPATIENT
Start: 2025-05-17 | End: 2025-05-22 | Stop reason: HOSPADM

## 2025-05-17 RX ORDER — AMLODIPINE BESYLATE 5 MG/1
5 TABLET ORAL DAILY
Status: DISCONTINUED | OUTPATIENT
Start: 2025-05-17 | End: 2025-05-17

## 2025-05-17 RX ORDER — HYDROCHLOROTHIAZIDE 25 MG/1
25 TABLET ORAL DAILY
Status: DISCONTINUED | OUTPATIENT
Start: 2025-05-17 | End: 2025-05-22 | Stop reason: HOSPADM

## 2025-05-17 RX ADMIN — CHOLECALCIFEROL TAB 125 MCG (5000 UNIT) 5000 UNITS: 125 TAB at 08:35

## 2025-05-17 RX ADMIN — METOPROLOL TARTRATE 50 MG: 50 TABLET, FILM COATED ORAL at 08:35

## 2025-05-17 RX ADMIN — POLYETHYLENE GLYCOL 3350 17 G: 17 POWDER, FOR SOLUTION ORAL at 08:36

## 2025-05-17 RX ADMIN — LISINOPRIL 40 MG: 20 TABLET ORAL at 08:35

## 2025-05-17 RX ADMIN — SODIUM CHLORIDE, PRESERVATIVE FREE 10 ML: 5 INJECTION INTRAVENOUS at 21:00

## 2025-05-17 RX ADMIN — ACETAMINOPHEN 650 MG: 325 TABLET ORAL at 14:24

## 2025-05-17 RX ADMIN — HYDROCHLOROTHIAZIDE 25 MG: 25 TABLET ORAL at 08:35

## 2025-05-17 RX ADMIN — ACETAMINOPHEN 650 MG: 325 TABLET ORAL at 06:02

## 2025-05-17 RX ADMIN — SODIUM CHLORIDE, PRESERVATIVE FREE 10 ML: 5 INJECTION INTRAVENOUS at 08:36

## 2025-05-17 RX ADMIN — CLONIDINE HYDROCHLORIDE 0.1 MG: 0.2 TABLET ORAL at 06:03

## 2025-05-17 RX ADMIN — PANTOPRAZOLE SODIUM 40 MG: 40 TABLET, DELAYED RELEASE ORAL at 06:03

## 2025-05-17 RX ADMIN — METOPROLOL TARTRATE 50 MG: 50 TABLET, FILM COATED ORAL at 21:01

## 2025-05-17 RX ADMIN — ACETAMINOPHEN 650 MG: 325 TABLET ORAL at 21:00

## 2025-05-17 RX ADMIN — POTASSIUM CHLORIDE 10 MEQ: 750 TABLET, EXTENDED RELEASE ORAL at 08:35

## 2025-05-17 RX ADMIN — OXYCODONE 10 MG: 5 TABLET ORAL at 21:18

## 2025-05-17 RX ADMIN — ACETAMINOPHEN 650 MG: 325 TABLET ORAL at 08:35

## 2025-05-17 RX ADMIN — BISACODYL 5 MG: 5 TABLET, COATED ORAL at 08:35

## 2025-05-17 RX ADMIN — CYCLOBENZAPRINE 10 MG: 10 TABLET, FILM COATED ORAL at 14:45

## 2025-05-17 RX ADMIN — OXYCODONE 10 MG: 5 TABLET ORAL at 08:34

## 2025-05-17 RX ADMIN — WATER 1000 MG: 1 INJECTION INTRAMUSCULAR; INTRAVENOUS; SUBCUTANEOUS at 14:25

## 2025-05-17 ASSESSMENT — PAIN DESCRIPTION - ORIENTATION: ORIENTATION: POSTERIOR

## 2025-05-17 ASSESSMENT — PAIN - FUNCTIONAL ASSESSMENT
PAIN_FUNCTIONAL_ASSESSMENT: ACTIVITIES ARE NOT PREVENTED
PAIN_FUNCTIONAL_ASSESSMENT: ACTIVITIES ARE NOT PREVENTED

## 2025-05-17 ASSESSMENT — PAIN DESCRIPTION - LOCATION
LOCATION: NECK
LOCATION: NECK;ARM

## 2025-05-17 ASSESSMENT — PAIN SCALES - GENERAL
PAINLEVEL_OUTOF10: 7
PAINLEVEL_OUTOF10: 2
PAINLEVEL_OUTOF10: 0
PAINLEVEL_OUTOF10: 7

## 2025-05-17 ASSESSMENT — PAIN DESCRIPTION - PAIN TYPE: TYPE: SURGICAL PAIN

## 2025-05-17 ASSESSMENT — PAIN DESCRIPTION - DESCRIPTORS
DESCRIPTORS: ACHING
DESCRIPTORS: ACHING

## 2025-05-17 ASSESSMENT — PAIN DESCRIPTION - ONSET: ONSET: GRADUAL

## 2025-05-17 ASSESSMENT — PAIN DESCRIPTION - FREQUENCY: FREQUENCY: INTERMITTENT

## 2025-05-17 NOTE — PROGRESS NOTES
Physical Therapy Note:    Attempted to see patient this PM for physical therapy treatment  session. Patient declined therapy this afternoon stating he sat up too long and is too tired now. Will follow and re-attempt as schedule permits/patient available. Thank you,    CAITLYN BARRETT, [facility]     Rehab Caseload Tracker

## 2025-05-17 NOTE — PROGRESS NOTES
Hospitalist Progress Note   Admit Date:  2025  4:59 AM   Name:  Shlomo Rudolph Jr.   Age:  70 y.o.  Sex:  male  :  1955   MRN:  [id number]   Room:  [facility]/    Presenting/Chief Complaint: No chief complaint on file.     Reason(s) for Admission: Cervical myelopathy (HCC) [G95.9]  Myelopathic gait [R26.89]  DDD (degenerative disc disease), cervical [M50.30]     Hospital Course:     Shlomo Rudolph Jr. is a 70 y.o. male with history of hypertension for which he takes lisinopril 20 mg daily unknown control, hyperlipidemia untreated, vitamin D deficiency treated with 5000 individual units daily, GERD, metabolic syndrome apparently no meds and he has a chronic smoker not interested presently in quitting.     He is postop cervical myelopathy-has known degenerative disc and joint disease.  Analgesia not ideal-reluctant to ask for pain meds by discussion with him.  Also likely hyper catecholamine state postop.  He received ACE inhibitor this morning and we have added clonidine as needed and will add additional fixed dose beta-blockade-monitor for bradycardia.  Has order for Trandate for accelerated hypertension but given rate limiting meds discussed change to as needed hydralazine if further meds needed.  He appears agreeable to plan of care.  He denies chest pain, dyspnea orthopnea palpitations.     Subjective & 24hr Events:      Overall BP improved-avoid rapid addition of multiple meds to avoid overtreatment as postoperative hyper catecholamine state resolves.  Appears to have had nice response to HCTZ today and will continue with electrolyte supplementation.  He denies new complaints-tolerating empiric antibiotic for pyuria-urine culture pending.  No significant RBC per hpf urine to suggest nephritis-likely essential hypertension               Assessment & Plan:      Principal Problem:    Cervical myelopathy (HCC)  Plan: Postoperative day #1 surgical repair-primary orthopedic neurosurgical

## 2025-05-17 NOTE — PROGRESS NOTES
ORTHO PROGRESS NOTE    May 17, 2025    Admit Date: 2025  Post Op day: 3 Days Post-Op      Subjective:     Shlomo Rudolph Jr. is a patient who is now 3 Days Post-Op  and has no complaints.       Objective:     PT/OT:    Progressing    Vital Signs:    Patient Vitals for the past 8 hrs:   BP Temp Temp src Pulse Resp SpO2   25 0710 (!) 158/76 98.1 °F (36.7 °C) Axillary 58 14 100 %   25 0450 (!) 170/85 97.9 °F (36.6 °C) Oral 59 17 95 %     Temp (24hrs), Av.9 °F (36.6 °C), Min:97.7 °F (36.5 °C), Max:98.1 °F (36.7 °C)      LAB:    Recent Labs     25  0531   HGB 13.3*   WBC 9.8          Physical Exam:    Awake and in no acute distress.  Mood and affect appropriate.  Respirations unlabored and no evidence cyanosis.  Calves nontender.  Abdomen soft and nontender.  Dressing clean/dry  No new neurologic deficit.    Assessment:      3 Days Post-Op STATUS POST Procedure(s):  ERAS C3-C5  posterior cervical fusion with allograft and instrumentation and C2-C4 laminectomy      Plan:     D/C drain  Anticipate discharge to: Inpatient Rehab      Signed By: MARILU ZAVALETA MD

## 2025-05-17 NOTE — PROGRESS NOTES
require seated rest breaks and cueing for gait technique.  He was left sitting up in chair and left with needs in reach, alarm on.  Will continue with POC.       SUBJECTIVE:   Mr. Rudolph joking around throughout session, no complaints.    Social/Functional Lives With: Alone  Type of Home: Apartment  OBJECTIVE:     PAIN: VITALS / O2: PRECAUTION / LINES / DRAINS:   Pre Treatment:    5/10    Post Treatment: 5/10 Vitals        Oxygen    Hemovac    RESTRICTIONS/PRECAUTIONS:        MOBILITY: I Mod I S SBA CGA Min Mod Max Total  NT x2 Comments:   Bed Mobility    Rolling [] [] [] [] [] [] [] [] [] [x] []    Supine to Sit [] [] [] [x] [] [] [] [] [] [] []    Scooting [] [] [] [x] [] [] [] [] [] [] []    Sit to Supine [] [] [] [] [] [] [] [] [] [x] []    Transfers    Sit to Stand [] [] [] [] [x] [] [] [] [] [] []    Bed to Chair [] [] [] [] [x] [x] [] [] [] [] []    Stand to Sit [] [] [] [] [x] [] [] [] [] [] []     [] [] [] [] [] [] [] [] [] [] []    I=Independent, Mod I=Modified Independent, S=Supervision, SBA=Standby Assistance, CGA=Contact Guard Assistance,   Min=Minimal Assistance, Mod=Moderate Assistance, Max=Maximal Assistance, Total=Total Assistance, NT=Not Tested    BALANCE: Good Fair+ Fair Fair- Poor NT Comments   Sitting Static [x] [] [] [] [] []    Sitting Dynamic [] [x] [] [] [] []              Standing Static [] [x] [x] [] [] []    Standing Dynamic [] [] [x] [] [] []      GAIT: I Mod I S SBA CGA Min Mod Max Total  NT x2 Comments:   Level of Assistance [] [] [] [] [x] [x] [] [] [] [] [] Chair follow   Distance 2 x 65  feet    DME Rolling Walker    Gait Quality Decreased radha , Decreased step clearance, Decreased step length, Narrow base of support, Scissoring, Trunk flexion, and Trunk sway increased    Weightbearing Status      Stairs      I=Independent, Mod I=Modified Independent, S=Supervision, SBA=Standby Assistance, CGA=Contact Guard Assistance,   Min=Minimal Assistance, Mod=Moderate Assistance,

## 2025-05-18 LAB
ANION GAP SERPL CALC-SCNC: 11 MMOL/L (ref 7–16)
BACTERIA SPEC CULT: NORMAL
BASOPHILS # BLD: 0.04 K/UL (ref 0–0.2)
BASOPHILS NFR BLD: 0.4 % (ref 0–2)
BUN SERPL-MCNC: 19 MG/DL (ref 8–23)
CALCIUM SERPL-MCNC: 9 MG/DL (ref 8.8–10.2)
CHLORIDE SERPL-SCNC: 100 MMOL/L (ref 98–107)
CO2 SERPL-SCNC: 24 MMOL/L (ref 20–29)
CREAT SERPL-MCNC: 1.13 MG/DL (ref 0.8–1.3)
DIFFERENTIAL METHOD BLD: ABNORMAL
EOSINOPHIL # BLD: 0.15 K/UL (ref 0–0.8)
EOSINOPHIL NFR BLD: 1.4 % (ref 0.5–7.8)
ERYTHROCYTE [DISTWIDTH] IN BLOOD BY AUTOMATED COUNT: 13.3 % (ref 11.9–14.6)
GLUCOSE BLD STRIP.AUTO-MCNC: 104 MG/DL (ref 65–100)
GLUCOSE BLD STRIP.AUTO-MCNC: 120 MG/DL (ref 65–100)
GLUCOSE BLD STRIP.AUTO-MCNC: 127 MG/DL (ref 65–100)
GLUCOSE BLD STRIP.AUTO-MCNC: 222 MG/DL (ref 65–100)
GLUCOSE SERPL-MCNC: 107 MG/DL (ref 70–99)
HCT VFR BLD AUTO: 42.6 % (ref 41.1–50.3)
HGB BLD-MCNC: 14.1 G/DL (ref 13.6–17.2)
IMM GRANULOCYTES # BLD AUTO: 0.04 K/UL (ref 0–0.5)
IMM GRANULOCYTES NFR BLD AUTO: 0.4 % (ref 0–5)
LYMPHOCYTES # BLD: 2.07 K/UL (ref 0.5–4.6)
LYMPHOCYTES NFR BLD: 19.8 % (ref 13–44)
MCH RBC QN AUTO: 28.5 PG (ref 26.1–32.9)
MCHC RBC AUTO-ENTMCNC: 33.1 G/DL (ref 31.4–35)
MCV RBC AUTO: 86.1 FL (ref 82–102)
MONOCYTES # BLD: 1.36 K/UL (ref 0.1–1.3)
MONOCYTES NFR BLD: 13 % (ref 4–12)
NEUTS SEG # BLD: 6.82 K/UL (ref 1.7–8.2)
NEUTS SEG NFR BLD: 65 % (ref 43–78)
NRBC # BLD: 0 K/UL (ref 0–0.2)
PLATELET # BLD AUTO: 253 K/UL (ref 150–450)
PMV BLD AUTO: 10.1 FL (ref 9.4–12.3)
POTASSIUM SERPL-SCNC: 4.2 MMOL/L (ref 3.5–5.1)
RBC # BLD AUTO: 4.95 M/UL (ref 4.23–5.6)
SERVICE CMNT-IMP: ABNORMAL
SERVICE CMNT-IMP: NORMAL
SODIUM SERPL-SCNC: 135 MMOL/L (ref 136–145)
WBC # BLD AUTO: 10.5 K/UL (ref 4.3–11.1)

## 2025-05-18 PROCEDURE — 6370000000 HC RX 637 (ALT 250 FOR IP): Performed by: ORTHOPAEDIC SURGERY

## 2025-05-18 PROCEDURE — 36415 COLL VENOUS BLD VENIPUNCTURE: CPT

## 2025-05-18 PROCEDURE — 97530 THERAPEUTIC ACTIVITIES: CPT

## 2025-05-18 PROCEDURE — 2500000003 HC RX 250 WO HCPCS: Performed by: INTERNAL MEDICINE

## 2025-05-18 PROCEDURE — 6370000000 HC RX 637 (ALT 250 FOR IP): Performed by: INTERNAL MEDICINE

## 2025-05-18 PROCEDURE — 1100000000 HC RM PRIVATE

## 2025-05-18 PROCEDURE — 6360000002 HC RX W HCPCS: Performed by: INTERNAL MEDICINE

## 2025-05-18 PROCEDURE — 82962 GLUCOSE BLOOD TEST: CPT

## 2025-05-18 PROCEDURE — 85025 COMPLETE CBC W/AUTO DIFF WBC: CPT

## 2025-05-18 PROCEDURE — 80048 BASIC METABOLIC PNL TOTAL CA: CPT

## 2025-05-18 PROCEDURE — 2500000003 HC RX 250 WO HCPCS: Performed by: ORTHOPAEDIC SURGERY

## 2025-05-18 RX ADMIN — ACETAMINOPHEN 650 MG: 325 TABLET ORAL at 05:16

## 2025-05-18 RX ADMIN — POTASSIUM CHLORIDE 10 MEQ: 750 TABLET, EXTENDED RELEASE ORAL at 08:43

## 2025-05-18 RX ADMIN — CYCLOBENZAPRINE 10 MG: 10 TABLET, FILM COATED ORAL at 08:43

## 2025-05-18 RX ADMIN — METOPROLOL TARTRATE 50 MG: 50 TABLET, FILM COATED ORAL at 08:44

## 2025-05-18 RX ADMIN — ACETAMINOPHEN 650 MG: 325 TABLET ORAL at 14:49

## 2025-05-18 RX ADMIN — HYDROCHLOROTHIAZIDE 25 MG: 25 TABLET ORAL at 08:43

## 2025-05-18 RX ADMIN — LISINOPRIL 40 MG: 20 TABLET ORAL at 08:44

## 2025-05-18 RX ADMIN — ACETAMINOPHEN 650 MG: 325 TABLET ORAL at 08:43

## 2025-05-18 RX ADMIN — OXYCODONE 10 MG: 5 TABLET ORAL at 10:39

## 2025-05-18 RX ADMIN — ACETAMINOPHEN 650 MG: 325 TABLET ORAL at 21:00

## 2025-05-18 RX ADMIN — SODIUM CHLORIDE, PRESERVATIVE FREE 10 ML: 5 INJECTION INTRAVENOUS at 21:01

## 2025-05-18 RX ADMIN — PANTOPRAZOLE SODIUM 40 MG: 40 TABLET, DELAYED RELEASE ORAL at 05:16

## 2025-05-18 RX ADMIN — WATER 1000 MG: 1 INJECTION INTRAMUSCULAR; INTRAVENOUS; SUBCUTANEOUS at 14:49

## 2025-05-18 RX ADMIN — METOPROLOL TARTRATE 50 MG: 50 TABLET, FILM COATED ORAL at 21:00

## 2025-05-18 RX ADMIN — INSULIN LISPRO 2 UNITS: 100 INJECTION, SOLUTION INTRAVENOUS; SUBCUTANEOUS at 21:00

## 2025-05-18 RX ADMIN — CHOLECALCIFEROL TAB 125 MCG (5000 UNIT) 5000 UNITS: 125 TAB at 08:44

## 2025-05-18 RX ADMIN — BISACODYL 5 MG: 5 TABLET, COATED ORAL at 08:44

## 2025-05-18 ASSESSMENT — PAIN DESCRIPTION - ORIENTATION: ORIENTATION: POSTERIOR

## 2025-05-18 ASSESSMENT — PAIN - FUNCTIONAL ASSESSMENT: PAIN_FUNCTIONAL_ASSESSMENT: ACTIVITIES ARE NOT PREVENTED

## 2025-05-18 ASSESSMENT — PAIN SCALES - GENERAL
PAINLEVEL_OUTOF10: 0
PAINLEVEL_OUTOF10: 0
PAINLEVEL_OUTOF10: 7

## 2025-05-18 ASSESSMENT — PAIN DESCRIPTION - DESCRIPTORS: DESCRIPTORS: ACHING

## 2025-05-18 ASSESSMENT — PAIN DESCRIPTION - LOCATION: LOCATION: NECK

## 2025-05-18 NOTE — PROGRESS NOTES
Physical Therapy Note:    Attempted to see patient this PM for physical therapy treatment  session. Patient declined therapy stating, \"I'm not getting up now\". Will follow and re-attempt as schedule permits/patient available. Thank you,    CAITLYN BARRETT, [facility]     Rehab Caseload Tracker

## 2025-05-18 NOTE — PROGRESS NOTES
Hospitalist Progress Note   Admit Date:  2025  4:59 AM   Name:  Shlomo Rudolph Jr.   Age:  70 y.o.  Sex:  male  :  1955   MRN:  [id number]   Room:  [facility]    Presenting/Chief Complaint: No chief complaint on file.     Reason(s) for Admission: Cervical myelopathy (HCC) [G95.9]  Myelopathic gait [R26.89]  DDD (degenerative disc disease), cervical [M50.30]     Hospital Course:     Shlomo Rudolph Jr. is a 70 y.o. male with history of hypertension for which he takes lisinopril 20 mg daily unknown control, hyperlipidemia untreated, vitamin D deficiency treated with 5000 individual units daily, GERD, metabolic syndrome apparently no meds and he has a chronic smoker not interested presently in quitting.     He is postop cervical myelopathy-has known degenerative disc and joint disease.  Analgesia not ideal-reluctant to ask for pain meds by discussion with him.  Also likely hyper catecholamine state postop.  He received ACE inhibitor this morning and we have added clonidine as needed and will add additional fixed dose beta-blockade-monitor for bradycardia.  Has order for Trandate for accelerated hypertension but given rate limiting meds discussed change to as needed hydralazine if further meds needed.  He appears agreeable to plan of care.  He denies chest pain, dyspnea orthopnea palpitations.     Subjective & 24hr Events:      Blood pressures are improving-analgesia improved today.  Urine culture no growth-okay to convert to oral cephalosporin if discharged to rehab sometime soon.  He did not ask about going outside for cigarette break today-nicotine patch ordered as needed.  Denies dyspnea chest pain nausea vomiting diarrhea               Assessment & Plan:      Principal Problem:    Cervical myelopathy (HCC)  Plan: Postoperative day #1 surgical repair-primary orthopedic neurosurgical service  Active Problems:--postoperative day #2--BP improved-analgesia appears effective-postop management

## 2025-05-18 NOTE — PROGRESS NOTES
ACUTE PHYSICAL THERAPY GOALS:   (Developed with and agreed upon by patient and/or caregiver.)  Pt will perform bed mobility with SBA while appropriately maintaining spinal precautions in 7 therapy sessions.  Pt will perform sit-to-stand/ stand-to-sit transfers SBA in 7 therapy sessions.  Pt will ambulate 150 ft SBA with use of LRAD/no device and breaks as needed in 7 therapy sessions.  Pt will tolerate 15 minutes of standing activity with LRAD/no device in 7 therapy sessions.  Pt will perform standing dynamic balance activities with minimal postural sway in 7 therapy sessions.  Pt will tolerate multiple sets and reps of BLE exercises in 7 therapy sessions.  Pt will accurately verbalize and demonstrate spinal precautions in 7 therapy sessions    PHYSICAL THERAPY: Daily Note AM   (Link to Caseload Tracking: PT Visit Days : 5  Time In/Out PT Charge Capture  Rehab Caseload Tracker  Orders    Shlomo Rudolph Jr. is a 70 y.o. male   PRIMARY DIAGNOSIS: Cervical myelopathy (HCC)  Cervical myelopathy (HCC) [G95.9]  Myelopathic gait [R26.89]  DDD (degenerative disc disease), cervical [M50.30]  Procedure(s) (LRB):  ERAS C3-C5  posterior cervical fusion with allograft and instrumentation and C2-C4 laminectomy (N/A)  4 Days Post-Op  Inpatient: Payor: [employer] MEDICARE / Plan: [employer] CHOICE-O MEDICARE / Product Type: *No Product type* /     ASSESSMENT:     REHAB RECOMMENDATIONS:   Recommendation to date pending progress:  Setting:  Inpatient Rehab Facility    Equipment:    To Be Determined     ASSESSMENT:  Mr. Rudolph continues to make slow progress toward goals.  He requires additional cueing throughout for spinal precautions and safety awareness.  He reports feeling more tired this morning and wasn't able to ambulate as far.  He sat to side of bed with SBA.  He does throw himself back to pull his legs up and fix his socks. He stood CGA and ambulated about 30' using rolling walker and min assist and chair follow.  He

## 2025-05-18 NOTE — PROGRESS NOTES
ORTHO PROGRESS NOTE    May 18, 2025    Admit Date: 2025  Post Op day: 4 Days Post-Op      Subjective:     Shlomo Rudolph Jr. is a patient who is now 4 Days Post-Op  and has no complaints.       Objective:     PT/OT:    Progressing    Vital Signs:    Patient Vitals for the past 8 hrs:   BP Temp Temp src Pulse Resp SpO2   25 0735 (!) 144/81 98.4 °F (36.9 °C) Axillary 81 -- 92 %   25 0451 (!) 174/85 -- Oral 68 17 98 %     Temp (24hrs), Av °F (36.7 °C), Min:97.5 °F (36.4 °C), Max:98.4 °F (36.9 °C)      LAB:    Recent Labs     25  0556   HGB 14.1   WBC 10.5          Physical Exam:    Awake and in no acute distress.  Mood and affect appropriate.  Respirations unlabored and no evidence cyanosis.  Calves nontender.  Abdomen soft and nontender.  Dressing clean/dry  No new neurologic deficit.    Assessment:      4 Days Post-Op STATUS POST Procedure(s):  ERAS C3-C5  posterior cervical fusion with allograft and instrumentation and C2-C4 laminectomy      Plan:     9th floor pending insurance       Signed By: MARILU ZAVALETA MD

## 2025-05-19 LAB
ANION GAP SERPL CALC-SCNC: 13 MMOL/L (ref 7–16)
BUN SERPL-MCNC: 21 MG/DL (ref 8–23)
CALCIUM SERPL-MCNC: 9.3 MG/DL (ref 8.8–10.2)
CHLORIDE SERPL-SCNC: 97 MMOL/L (ref 98–107)
CO2 SERPL-SCNC: 22 MMOL/L (ref 20–29)
CREAT SERPL-MCNC: 1.27 MG/DL (ref 0.8–1.3)
ERYTHROCYTE [DISTWIDTH] IN BLOOD BY AUTOMATED COUNT: 13 % (ref 11.9–14.6)
GLUCOSE BLD STRIP.AUTO-MCNC: 101 MG/DL (ref 65–100)
GLUCOSE BLD STRIP.AUTO-MCNC: 121 MG/DL (ref 65–100)
GLUCOSE BLD STRIP.AUTO-MCNC: 165 MG/DL (ref 65–100)
GLUCOSE BLD STRIP.AUTO-MCNC: 176 MG/DL (ref 65–100)
GLUCOSE SERPL-MCNC: 201 MG/DL (ref 70–99)
HCT VFR BLD AUTO: 44.3 % (ref 41.1–50.3)
HGB BLD-MCNC: 15.2 G/DL (ref 13.6–17.2)
MCH RBC QN AUTO: 29.2 PG (ref 26.1–32.9)
MCHC RBC AUTO-ENTMCNC: 34.3 G/DL (ref 31.4–35)
MCV RBC AUTO: 85 FL (ref 82–102)
NRBC # BLD: 0 K/UL (ref 0–0.2)
PLATELET # BLD AUTO: 283 K/UL (ref 150–450)
PMV BLD AUTO: 9.9 FL (ref 9.4–12.3)
POTASSIUM SERPL-SCNC: 3.9 MMOL/L (ref 3.5–5.1)
RBC # BLD AUTO: 5.21 M/UL (ref 4.23–5.6)
SERVICE CMNT-IMP: ABNORMAL
SODIUM SERPL-SCNC: 131 MMOL/L (ref 136–145)
WBC # BLD AUTO: 12.1 K/UL (ref 4.3–11.1)

## 2025-05-19 PROCEDURE — 2500000003 HC RX 250 WO HCPCS: Performed by: ORTHOPAEDIC SURGERY

## 2025-05-19 PROCEDURE — 82962 GLUCOSE BLOOD TEST: CPT

## 2025-05-19 PROCEDURE — 80048 BASIC METABOLIC PNL TOTAL CA: CPT

## 2025-05-19 PROCEDURE — 6370000000 HC RX 637 (ALT 250 FOR IP): Performed by: INTERNAL MEDICINE

## 2025-05-19 PROCEDURE — 6370000000 HC RX 637 (ALT 250 FOR IP): Performed by: ORTHOPAEDIC SURGERY

## 2025-05-19 PROCEDURE — 36415 COLL VENOUS BLD VENIPUNCTURE: CPT

## 2025-05-19 PROCEDURE — 97530 THERAPEUTIC ACTIVITIES: CPT

## 2025-05-19 PROCEDURE — 97535 SELF CARE MNGMENT TRAINING: CPT

## 2025-05-19 PROCEDURE — 1100000000 HC RM PRIVATE

## 2025-05-19 PROCEDURE — 6360000002 HC RX W HCPCS

## 2025-05-19 PROCEDURE — 85027 COMPLETE CBC AUTOMATED: CPT

## 2025-05-19 RX ORDER — ENOXAPARIN SODIUM 100 MG/ML
40 INJECTION SUBCUTANEOUS EVERY 24 HOURS
Status: DISCONTINUED | OUTPATIENT
Start: 2025-05-19 | End: 2025-05-22 | Stop reason: HOSPADM

## 2025-05-19 RX ORDER — INSULIN GLARGINE 100 [IU]/ML
10 INJECTION, SOLUTION SUBCUTANEOUS DAILY
Status: DISCONTINUED | OUTPATIENT
Start: 2025-05-20 | End: 2025-05-20

## 2025-05-19 RX ADMIN — ACETAMINOPHEN 650 MG: 325 TABLET ORAL at 08:41

## 2025-05-19 RX ADMIN — METOPROLOL TARTRATE 50 MG: 50 TABLET, FILM COATED ORAL at 21:02

## 2025-05-19 RX ADMIN — HYDROCHLOROTHIAZIDE 25 MG: 25 TABLET ORAL at 08:41

## 2025-05-19 RX ADMIN — BISACODYL 5 MG: 5 TABLET, COATED ORAL at 08:41

## 2025-05-19 RX ADMIN — POTASSIUM CHLORIDE 10 MEQ: 750 TABLET, EXTENDED RELEASE ORAL at 08:41

## 2025-05-19 RX ADMIN — ACETAMINOPHEN 650 MG: 325 TABLET ORAL at 21:02

## 2025-05-19 RX ADMIN — CHOLECALCIFEROL TAB 125 MCG (5000 UNIT) 5000 UNITS: 125 TAB at 08:41

## 2025-05-19 RX ADMIN — ACETAMINOPHEN 650 MG: 325 TABLET ORAL at 05:40

## 2025-05-19 RX ADMIN — POLYETHYLENE GLYCOL 3350 17 G: 17 POWDER, FOR SOLUTION ORAL at 08:41

## 2025-05-19 RX ADMIN — OXYCODONE 10 MG: 5 TABLET ORAL at 08:45

## 2025-05-19 RX ADMIN — METOPROLOL TARTRATE 50 MG: 50 TABLET, FILM COATED ORAL at 08:41

## 2025-05-19 RX ADMIN — SODIUM CHLORIDE, PRESERVATIVE FREE 10 ML: 5 INJECTION INTRAVENOUS at 21:03

## 2025-05-19 RX ADMIN — CYCLOBENZAPRINE 10 MG: 10 TABLET, FILM COATED ORAL at 08:45

## 2025-05-19 RX ADMIN — SODIUM CHLORIDE, PRESERVATIVE FREE 10 ML: 5 INJECTION INTRAVENOUS at 08:48

## 2025-05-19 RX ADMIN — PANTOPRAZOLE SODIUM 40 MG: 40 TABLET, DELAYED RELEASE ORAL at 05:41

## 2025-05-19 RX ADMIN — ACETAMINOPHEN 650 MG: 325 TABLET ORAL at 14:40

## 2025-05-19 RX ADMIN — OXYCODONE 10 MG: 5 TABLET ORAL at 21:05

## 2025-05-19 RX ADMIN — LISINOPRIL 40 MG: 20 TABLET ORAL at 08:41

## 2025-05-19 RX ADMIN — ENOXAPARIN SODIUM 40 MG: 100 INJECTION SUBCUTANEOUS at 14:40

## 2025-05-19 ASSESSMENT — PAIN SCALES - GENERAL
PAINLEVEL_OUTOF10: 0
PAINLEVEL_OUTOF10: 8
PAINLEVEL_OUTOF10: 0
PAINLEVEL_OUTOF10: 0
PAINLEVEL_OUTOF10: 8

## 2025-05-19 ASSESSMENT — PAIN DESCRIPTION - ORIENTATION
ORIENTATION: ANTERIOR;POSTERIOR
ORIENTATION: POSTERIOR

## 2025-05-19 ASSESSMENT — PAIN DESCRIPTION - LOCATION
LOCATION: NECK
LOCATION: NECK

## 2025-05-19 ASSESSMENT — PAIN DESCRIPTION - PAIN TYPE
TYPE: SURGICAL PAIN
TYPE: ACUTE PAIN;SURGICAL PAIN

## 2025-05-19 ASSESSMENT — PAIN DESCRIPTION - ONSET
ONSET: GRADUAL
ONSET: GRADUAL

## 2025-05-19 ASSESSMENT — PAIN DESCRIPTION - FREQUENCY
FREQUENCY: INTERMITTENT
FREQUENCY: INTERMITTENT

## 2025-05-19 ASSESSMENT — PAIN DESCRIPTION - DESCRIPTORS
DESCRIPTORS: ACHING;SORE
DESCRIPTORS: ACHING;DISCOMFORT

## 2025-05-19 NOTE — PLAN OF CARE
Problem: Safety - Adult  Goal: Free from fall injury  5/18/2025 2017 by Kerry Zelaya GN  Outcome: Progressing  Flowsheets (Taken 5/18/2025 1941)  Free From Fall Injury: Instruct family/caregiver on patient safety     Problem: Pain  Goal: Verbalizes/displays adequate comfort level or baseline comfort level  5/18/2025 2017 by Kerry Zelaya GN  Outcome: Progressing  Flowsheets (Taken 5/18/2025 1941)  Verbalizes/displays adequate comfort level or baseline comfort level: Encourage patient to monitor pain and request assistance     Problem: Discharge Planning  Goal: Discharge to home or other facility with appropriate resources  5/18/2025 2017 by Kerry Zelaya GN  Outcome: Progressing  Flowsheets (Taken 5/18/2025 1941)  Discharge to home or other facility with appropriate resources:   Identify barriers to discharge with patient and caregiver   Identify discharge learning needs (meds, wound care, etc)   Arrange for needed discharge resources and transportation as appropriate   Refer to discharge planning if patient needs post-hospital services based on physician order or complex needs related to functional status, cognitive ability or social support system     Problem: ABCDS Injury Assessment  Goal: Absence of physical injury  5/18/2025 2017 by Kerry Zelaya GN  Outcome: Progressing  Flowsheets (Taken 5/18/2025 1941)  Absence of Physical Injury: Implement safety measures based on patient assessment

## 2025-05-19 NOTE — PROGRESS NOTES
ACUTE PHYSICAL THERAPY GOALS:   (Developed with and agreed upon by patient and/or caregiver.)  Pt will perform bed mobility with SBA while appropriately maintaining spinal precautions in 7 therapy sessions.  Pt will perform sit-to-stand/ stand-to-sit transfers SBA in 7 therapy sessions.  Pt will ambulate 150 ft SBA with use of LRAD/no device and breaks as needed in 7 therapy sessions.  Pt will tolerate 15 minutes of standing activity with LRAD/no device in 7 therapy sessions.  Pt will perform standing dynamic balance activities with minimal postural sway in 7 therapy sessions.  Pt will tolerate multiple sets and reps of BLE exercises in 7 therapy sessions.  Pt will accurately verbalize and demonstrate spinal precautions in 7 therapy sessions    PHYSICAL THERAPY: Daily Note PM   (Link to Caseload Tracking: PT Visit Days : 6  Time In/Out PT Charge Capture  Rehab Caseload Tracker  Orders    Shlomo Rudolph Jr. is a 70 y.o. male   PRIMARY DIAGNOSIS: Cervical myelopathy (HCC)  Cervical myelopathy (HCC) [G95.9]  Myelopathic gait [R26.89]  DDD (degenerative disc disease), cervical [M50.30]  Procedure(s) (LRB):  ERAS C3-C5  posterior cervical fusion with allograft and instrumentation and C2-C4 laminectomy (N/A)  5 Days Post-Op  Inpatient: Payor: [employer] MEDICARE / Plan: [employer] CHOICE-O MEDICARE / Product Type: *No Product type* /     ASSESSMENT:     REHAB RECOMMENDATIONS:   Recommendation to date pending progress:  Setting:  Inpatient Rehab Facility    Equipment:    To Be Determined     ASSESSMENT:  Mr. Rudolph was supine on contact and agreeable to work with therapy. The PT session this PM focused on therapeutic activities including ambulation, bed mobility, sit to stand transfer, and sitting and standing balance . Pt needed SBA for bed mobility and minimal assistance for amb with RW. He amb 30' x 3 with 1 standing rest break and one seated rest break. Cues for walking closer to the RW and for posture.  He is  impulsive and likes to do things his way. Progress toward goals demonstrated by pt being able to tolerate more activity.  Pt still functioning below their baseline with deficits in balance, endurance, and strength and will benefit from continued physical therapy.          SUBJECTIVE:   Mr. Rudolph jo throughout session.    Social/Functional Lives With: Alone  Type of Home: Apartment  OBJECTIVE:     PAIN: VITALS / O2: PRECAUTION / LINES / DRAINS:   Pre Treatment:    C/o L UE pain.     Post Treatment: did not rate Vitals        Oxygen    Hemovac    RESTRICTIONS/PRECAUTIONS:        MOBILITY: I Mod I S SBA CGA Min Mod Max Total  NT x2 Comments:   Bed Mobility    Rolling [] [] [] [] [] [x] [] [] [] [] []    Supine to Sit [] [] [] [x] [] [] [] [] [] [] []    Scooting [] [] [] [x] [] [] [] [] [] [] []    Sit to Supine [] [] [] [] [] [] [] [] [] [x] []    Transfers    Sit to Stand [] [] [] [] [x] [] [] [] [] [] []    Bed to Chair [] [] [] [] [x] [x] [] [] [] [] []    Stand to Sit [] [] [] [] [x] [] [] [] [] [] []     [] [] [] [] [] [] [] [] [] [] []    I=Independent, Mod I=Modified Independent, S=Supervision, SBA=Standby Assistance, CGA=Contact Guard Assistance,   Min=Minimal Assistance, Mod=Moderate Assistance, Max=Maximal Assistance, Total=Total Assistance, NT=Not Tested    BALANCE: Good Fair+ Fair Fair- Poor NT Comments   Sitting Static [x] [] [] [] [] []    Sitting Dynamic [] [x] [] [] [] []              Standing Static [] [x] [x] [] [] []    Standing Dynamic [] [] [x] [] [] []      GAIT: I Mod I S SBA CGA Min Mod Max Total  NT x2 Comments:   Level of Assistance [] [] [] [] [] [x] [] [] [] [] [] Chair follow   Distance 3 x 30  feet  chair follow    DME Rolling Walker    Gait Quality Decreased radha , Decreased step clearance, Decreased step length, Narrow base of support, Scissoring, Trunk flexion, and Trunk sway increased    Weightbearing Status      Stairs      I=Independent, Mod I=Modified Independent,

## 2025-05-19 NOTE — PROGRESS NOTES
ORTHO PROGRESS NOTE    May 19, 2025    Admit Date: 2025  Post Op day: 5 Days Post-Op      Subjective:     Shlomo Rudolph Jr. is a patient who is now 5 Days Post-Op  and has no complaints.       Objective:     PT/OT:    Progressing    Vital Signs:    Patient Vitals for the past 8 hrs:   BP Temp Temp src Pulse Resp SpO2   25 0845 -- -- -- -- 18 --   25 0738 134/79 98.2 °F (36.8 °C) Oral 100 14 97 %     Temp (24hrs), Av.2 °F (36.8 °C), Min:98.1 °F (36.7 °C), Max:98.2 °F (36.8 °C)      LAB:    Recent Labs     25  0841   HGB 15.2   WBC 12.1*          Physical Exam:    Awake and in no acute distress.  Mood and affect appropriate.  Respirations unlabored and no evidence cyanosis.  Calves nontender.  Abdomen soft and nontender.  Dressing clean/dry  No new neurologic deficit.    Assessment:      5 Days Post-Op STATUS POST Procedure(s):  ERAS C3-C5  posterior cervical fusion with allograft and instrumentation and C2-C4 laminectomy      Plan:     [id number] performed with the medical director of [facility] who states that the patient does not meet criteria for the ninth floor.    -discharge planning      Signed By: Tree Almanzar MD

## 2025-05-19 NOTE — PROGRESS NOTES
Rec'd call from [facility] for IR.  They would like a peer to peer and the deadline is today 5/19 by 12 pm.  The number to call is [phone] option 5.  The case ref # is  #[id number].  The patients [facility] ID # is [id number]

## 2025-05-19 NOTE — PROGRESS NOTES
ACUTE PHYSICAL THERAPY GOALS:   (Developed with and agreed upon by patient and/or caregiver.)  Pt will perform bed mobility with SBA while appropriately maintaining spinal precautions in 7 therapy sessions.  Pt will perform sit-to-stand/ stand-to-sit transfers SBA in 7 therapy sessions.  Pt will ambulate 150 ft SBA with use of LRAD/no device and breaks as needed in 7 therapy sessions.  Pt will tolerate 15 minutes of standing activity with LRAD/no device in 7 therapy sessions.  Pt will perform standing dynamic balance activities with minimal postural sway in 7 therapy sessions.  Pt will tolerate multiple sets and reps of BLE exercises in 7 therapy sessions.  Pt will accurately verbalize and demonstrate spinal precautions in 7 therapy sessions    PHYSICAL THERAPY: Daily Note AM   (Link to Caseload Tracking: PT Visit Days : 6  Time In/Out PT Charge Capture  Rehab Caseload Tracker  Orders    Shlomo Rudolph Jr. is a 70 y.o. male   PRIMARY DIAGNOSIS: Cervical myelopathy (HCC)  Cervical myelopathy (HCC) [G95.9]  Myelopathic gait [R26.89]  DDD (degenerative disc disease), cervical [M50.30]  Procedure(s) (LRB):  ERAS C3-C5  posterior cervical fusion with allograft and instrumentation and C2-C4 laminectomy (N/A)  5 Days Post-Op  Inpatient: Payor: [employer] MEDICARE / Plan: [employer] CHOICE-O MEDICARE / Product Type: *No Product type* /     ASSESSMENT:     REHAB RECOMMENDATIONS:   Recommendation to date pending progress:  Setting:  Inpatient Rehab Facility    Equipment:    To Be Determined     ASSESSMENT:  Mr. Rudolph was supine on contact and agreeable to work with therapy. The PT session today focused on therapeutic activities including ambulation, bed mobility, sit to stand transfer, and sitting and standing balance . Pt needed SBA for bed mobility and minimal assistance to CGA for amb with RW. HE amb 30' x 3 with 1 standing rest break and one seated rest break. Cues for walking closer to the RW and for posture.  He

## 2025-05-19 NOTE — PROGRESS NOTES
Hospitalist Progress Note   Admit Date:  2025  4:59 AM   Name:  Shlomo Rudolph Jr.   Age:  70 y.o.  Sex:  male  :  1955   MRN:  [id number]   Room:  [facility]    Presenting/Chief Complaint: No chief complaint on file.     Reason(s) for Admission: Cervical myelopathy (HCC) [G95.9]  Myelopathic gait [R26.89]  DDD (degenerative disc disease), cervical [M50.30]     Hospital Course:   Shlomo Rudolph Jr. is a 70 y.o. male with medical history of hypertension, hyperlipidemia, vitamin D deficiency, GERD, metabolic syndrome, and tobacco use disorder who presents to the ED with known cervical myelopathy.    Since admission, orthopedic surgery performed surgery on . Physical therapy consulted and recommended inpatient rehab. Unfortunately, this was declined by peer to peer. Case management working on short-term rehab options.    Subjective & 24hr Events:   No overnight events.  Room air.  Postop day #5.  Doing well.  Denied from inpatient rehab.  Working on short-term rehab.    Assessment & Plan:     Cervical myelopathy  - Orthopedic surgery is primary.  - Surgery performed on .  - Pain medications available.  - PT, OT recommending inpatient rehab.  Declined on peer to peer.  - Case management working on short-term rehab options.    Accelerated hypertension  - Continue home medications.  - Stop as needed clonidine.    Suspected urinary tract infection  - Urinalysis suspicious for UTI.  - Urine culture no growth.  - Completed 3 days of Rocephin.  Hold further antibiotics.    Metabolic syndrome  Prediabetes mellitus  - A1c 6.0  - Sliding scale insulin, medium dose.  - Lantus 10 units every morning.  - Hypoglycemia protocol.    GERD  - Protonix daily.    Tobacco use disorder  - Nicotine patch as needed.    Vitamin D deficiency  - Continue vitamin D supplements.    Anticipated Discharge Arrangements:   Skilled Nursing Facility    PT/OT evals ordered?  Therapy evals ordered  Diet:  ADULT DIET;  Value Ref Range    POC Glucose 135 (H) 65 - 100 mg/dL    Performed by: Radha    POCT Glucose    Collection Time: 05/17/25  7:48 PM   Result Value Ref Range    POC Glucose 143 (H) 65 - 100 mg/dL    Performed by: JasonOBED    CBC with Auto Differential    Collection Time: 05/18/25  5:56 AM   Result Value Ref Range    WBC 10.5 4.3 - 11.1 K/uL    RBC 4.95 4.23 - 5.6 M/uL    Hemoglobin 14.1 13.6 - 17.2 g/dL    Hematocrit 42.6 41.1 - 50.3 %    MCV 86.1 82 - 102 FL    MCH 28.5 26.1 - 32.9 PG    MCHC 33.1 31.4 - 35.0 g/dL    RDW 13.3 11.9 - 14.6 %    Platelets 253 150 - 450 K/uL    MPV 10.1 9.4 - 12.3 FL    nRBC 0.00 0.0 - 0.2 K/uL    Differential Type AUTOMATED      Neutrophils % 65.0 43.0 - 78.0 %    Lymphocytes % 19.8 13.0 - 44.0 %    Monocytes % 13.0 (H) 4.0 - 12.0 %    Eosinophils % 1.4 0.5 - 7.8 %    Basophils % 0.4 0.0 - 2.0 %    Immature Granulocytes % 0.4 0.0 - 5.0 %    Neutrophils Absolute 6.82 1.70 - 8.20 K/UL    Lymphocytes Absolute 2.07 0.50 - 4.60 K/UL    Monocytes Absolute 1.36 (H) 0.10 - 1.30 K/UL    Eosinophils Absolute 0.15 0.00 - 0.80 K/UL    Basophils Absolute 0.04 0.00 - 0.20 K/UL    Immature Granulocytes Absolute 0.04 0.0 - 0.5 K/UL   Basic Metabolic Panel w/ Reflex to MG    Collection Time: 05/18/25  5:56 AM   Result Value Ref Range    Sodium 135 (L) 136 - 145 mmol/L    Potassium 4.2 3.5 - 5.1 mmol/L    Chloride 100 98 - 107 mmol/L    CO2 24 20 - 29 mmol/L    Anion Gap 11 7 - 16 mmol/L    Glucose 107 (H) 70 - 99 mg/dL    BUN 19 8 - 23 MG/DL    Creatinine 1.13 0.80 - 1.30 MG/DL    Est, Glom Filt Rate 69 >60 ml/min/1.73m2    Calcium 9.0 8.8 - 10.2 MG/DL   POCT Glucose    Collection Time: 05/18/25  6:05 AM   Result Value Ref Range    POC Glucose 104 (H) 65 - 100 mg/dL    Performed by: JasonOBED    POCT Glucose    Collection Time: 05/18/25 11:16 AM   Result Value Ref Range    POC Glucose 127 (H) 65 - 100 mg/dL    Performed by: ChristiOBED    POCT Glucose    Collection

## 2025-05-19 NOTE — PROGRESS NOTES
ACUTE OCCUPATIONAL THERAPY GOALS:   (Developed with and agreed upon by patient and/or caregiver.)    1. Pt will complete LB ADL I with AE as needed.   2. Pt will complete toileting I with AE as needed.   3. Pt will complete UB ADL I.  4. Pt will tolerate 25 minutes of OT treatment requiring 1-2 breaks as needed.   5. Pt will complete grooming tasks while standing at sink Mod I.  6. Pt will complete functional mobility and/or transfers via RW Mod I.   7. Pt will tolerate BUE exercises to increase strength/endurance for safe, functional transfers and/or functional mobility, and ADL participation.   8. Pt will verbalize and/or demonstrate understanding of cervical spine precautions during functional activity 100% of the time.       Timeframe: 7 visits  OCCUPATIONAL THERAPY: Daily Note AM   OT Visit Days: 3   Time In/Out  OT Charge Capture  Rehab Caseload Tracker  OT Orders    Shlomo Rudolph Jr. is a 70 y.o. male   PRIMARY DIAGNOSIS: Cervical myelopathy (HCC)  Cervical myelopathy (HCC) [G95.9]  Myelopathic gait [R26.89]  DDD (degenerative disc disease), cervical [M50.30]  Procedure(s) (LRB):  ERAS C3-C5  posterior cervical fusion with allograft and instrumentation and C2-C4 laminectomy (N/A)  5 Days Post-Op  Inpatient: Payor: [employer]ICARE / Plan: [employer] CHOICE-PPO MEDICARE / Product Type: *No Product type* /     ASSESSMENT:     REHAB RECOMMENDATIONS:   Recommendation to date pending progress:  Setting:  Inpatient Rehab Facility     Equipment:    To Be Determined     ASSESSMENT:  Mr. Rudolph presents in supine upon arrival. Pt transferred to sitting with SBA and additional time. Pt completed sit to stand with CGA/min a and a rolling walker and completed functional mobility to the sink with CGA/min a. Pt stood and completed grooming, hygiene, and TB bathing with CGA. Pt completed LB dressing with education on use of sock aid to increase independence with donning socks. He was able to don his pull up with CGA.  walker    I=Independent, Mod I=Modified Independent, S=Supervision/Setup, SBA=Standby Assistance, CGA=Contact Guard Assistance, Min=Minimal Assistance, Mod=Moderate Assistance, Max=Maximal Assistance, Total=Total Assistance, NT=Not Tested    BALANCE: Good Fair+ Fair Fair- Poor NT Comments   Sitting Static [] [x] [] [] [] []    Sitting Dynamic [] [x] [] [] [] []              Standing Static [] [] [x] [] [] []    Standing Dynamic [] [] [x] [] [] []        PLAN:     FREQUENCY/DURATION   OT Plan of Care: 4 times/week for duration of hospital stay or until stated goals are met, whichever comes first.    TREATMENT:     TREATMENT:   Co-Treatment between OT & PT necessary due to patient's decreased overall endurance/tolerance levels, as well as need for high level skilled assistance to complete functional transfers/mobility and functional tasks  Self Care (47 minutes): Patient participated in upper body bathing, lower body bathing, upper body dressing, lower body dressing, grooming, functional mobility, bed mobility, functional transfer, energy conservation, and compensatory technique in supported sitting and standing with minimal verbal, manual, and tactile cueing to increase independence, decrease assistance required, increase activity tolerance, increase safety awareness, and maintain precautions. The patient was educated on role of occupational therapy, compensatory technique during ADL , strategies to improve safety, transfer training and safety, and surgical precautions and patient verbalized understanding and will need reinforcement at next session.     TREATMENT GRID:  N/A    AFTER TREATMENT PRECAUTIONS: Alarm Activated, Bed/Chair Locked, Call light within reach, Chair, Needs within reach, and RN notified    INTERDISCIPLINARY COLLABORATION:  RN/ PCT, PT/ PTA, and OT/ AARON    EDUCATION:  OT educated patient  on role of occupational therapy and plan of care, spinal precautions, and walker management and safety.

## 2025-05-19 NOTE — PROGRESS NOTES
Per [employer], Peer to peer review result was a denial for IR Admission.  If patient wishes to pursue subacute placement, they will prioritize the request if submitted within 24 hrs.   The patient may appeal the denial by calling [phone] or by faxing [phone].  Case ref # is [id number]

## 2025-05-20 LAB
ANION GAP SERPL CALC-SCNC: 13 MMOL/L (ref 7–16)
BASOPHILS # BLD: 0.04 K/UL (ref 0–0.2)
BASOPHILS NFR BLD: 0.3 % (ref 0–2)
BUN SERPL-MCNC: 28 MG/DL (ref 8–23)
CALCIUM SERPL-MCNC: 9.6 MG/DL (ref 8.8–10.2)
CHLORIDE SERPL-SCNC: 97 MMOL/L (ref 98–107)
CO2 SERPL-SCNC: 23 MMOL/L (ref 20–29)
CREAT SERPL-MCNC: 1.3 MG/DL (ref 0.8–1.3)
DIFFERENTIAL METHOD BLD: ABNORMAL
EOSINOPHIL # BLD: 0.17 K/UL (ref 0–0.8)
EOSINOPHIL NFR BLD: 1.5 % (ref 0.5–7.8)
ERYTHROCYTE [DISTWIDTH] IN BLOOD BY AUTOMATED COUNT: 13 % (ref 11.9–14.6)
GLUCOSE BLD STRIP.AUTO-MCNC: 110 MG/DL (ref 65–100)
GLUCOSE BLD STRIP.AUTO-MCNC: 121 MG/DL (ref 65–100)
GLUCOSE BLD STRIP.AUTO-MCNC: 137 MG/DL (ref 65–100)
GLUCOSE BLD STRIP.AUTO-MCNC: 153 MG/DL (ref 65–100)
GLUCOSE SERPL-MCNC: 119 MG/DL (ref 70–99)
HCT VFR BLD AUTO: 45 % (ref 41.1–50.3)
HGB BLD-MCNC: 15.3 G/DL (ref 13.6–17.2)
IMM GRANULOCYTES # BLD AUTO: 0.06 K/UL (ref 0–0.5)
IMM GRANULOCYTES NFR BLD AUTO: 0.5 % (ref 0–5)
LYMPHOCYTES # BLD: 2.09 K/UL (ref 0.5–4.6)
LYMPHOCYTES NFR BLD: 17.9 % (ref 13–44)
MCH RBC QN AUTO: 28.9 PG (ref 26.1–32.9)
MCHC RBC AUTO-ENTMCNC: 34 G/DL (ref 31.4–35)
MCV RBC AUTO: 84.9 FL (ref 82–102)
MONOCYTES # BLD: 1.67 K/UL (ref 0.1–1.3)
MONOCYTES NFR BLD: 14.3 % (ref 4–12)
NEUTS SEG # BLD: 7.64 K/UL (ref 1.7–8.2)
NEUTS SEG NFR BLD: 65.5 % (ref 43–78)
NRBC # BLD: 0 K/UL (ref 0–0.2)
PLATELET # BLD AUTO: 295 K/UL (ref 150–450)
PMV BLD AUTO: 9.6 FL (ref 9.4–12.3)
POTASSIUM SERPL-SCNC: 4.1 MMOL/L (ref 3.5–5.1)
RBC # BLD AUTO: 5.3 M/UL (ref 4.23–5.6)
SERVICE CMNT-IMP: ABNORMAL
SODIUM SERPL-SCNC: 133 MMOL/L (ref 136–145)
WBC # BLD AUTO: 11.7 K/UL (ref 4.3–11.1)

## 2025-05-20 PROCEDURE — 6370000000 HC RX 637 (ALT 250 FOR IP): Performed by: INTERNAL MEDICINE

## 2025-05-20 PROCEDURE — 85025 COMPLETE CBC W/AUTO DIFF WBC: CPT

## 2025-05-20 PROCEDURE — 82962 GLUCOSE BLOOD TEST: CPT

## 2025-05-20 PROCEDURE — 97530 THERAPEUTIC ACTIVITIES: CPT

## 2025-05-20 PROCEDURE — 2500000003 HC RX 250 WO HCPCS: Performed by: ORTHOPAEDIC SURGERY

## 2025-05-20 PROCEDURE — 97161 PT EVAL LOW COMPLEX 20 MIN: CPT

## 2025-05-20 PROCEDURE — 1100000000 HC RM PRIVATE

## 2025-05-20 PROCEDURE — 6370000000 HC RX 637 (ALT 250 FOR IP): Performed by: ORTHOPAEDIC SURGERY

## 2025-05-20 PROCEDURE — 6360000002 HC RX W HCPCS

## 2025-05-20 PROCEDURE — 36415 COLL VENOUS BLD VENIPUNCTURE: CPT

## 2025-05-20 PROCEDURE — 80048 BASIC METABOLIC PNL TOTAL CA: CPT

## 2025-05-20 PROCEDURE — 2580000003 HC RX 258

## 2025-05-20 RX ORDER — SODIUM CHLORIDE 9 MG/ML
INJECTION, SOLUTION INTRAVENOUS CONTINUOUS
Status: ACTIVE | OUTPATIENT
Start: 2025-05-20 | End: 2025-05-20

## 2025-05-20 RX ADMIN — POLYETHYLENE GLYCOL 3350 17 G: 17 POWDER, FOR SOLUTION ORAL at 09:23

## 2025-05-20 RX ADMIN — PANTOPRAZOLE SODIUM 40 MG: 40 TABLET, DELAYED RELEASE ORAL at 06:03

## 2025-05-20 RX ADMIN — ENOXAPARIN SODIUM 40 MG: 100 INJECTION SUBCUTANEOUS at 13:52

## 2025-05-20 RX ADMIN — METOPROLOL TARTRATE 50 MG: 50 TABLET, FILM COATED ORAL at 09:22

## 2025-05-20 RX ADMIN — SODIUM CHLORIDE: 0.9 INJECTION, SOLUTION INTRAVENOUS at 11:35

## 2025-05-20 RX ADMIN — OXYCODONE 5 MG: 5 TABLET ORAL at 11:22

## 2025-05-20 RX ADMIN — OXYCODONE 10 MG: 5 TABLET ORAL at 19:29

## 2025-05-20 RX ADMIN — HYDROCHLOROTHIAZIDE 25 MG: 25 TABLET ORAL at 09:22

## 2025-05-20 RX ADMIN — ACETAMINOPHEN 650 MG: 325 TABLET ORAL at 09:22

## 2025-05-20 RX ADMIN — BISACODYL 5 MG: 5 TABLET, COATED ORAL at 09:22

## 2025-05-20 RX ADMIN — SODIUM CHLORIDE, PRESERVATIVE FREE 10 ML: 5 INJECTION INTRAVENOUS at 09:24

## 2025-05-20 RX ADMIN — CYCLOBENZAPRINE 10 MG: 10 TABLET, FILM COATED ORAL at 06:06

## 2025-05-20 RX ADMIN — METOPROLOL TARTRATE 50 MG: 50 TABLET, FILM COATED ORAL at 19:28

## 2025-05-20 RX ADMIN — OXYCODONE 10 MG: 5 TABLET ORAL at 06:06

## 2025-05-20 RX ADMIN — SODIUM CHLORIDE, PRESERVATIVE FREE 10 ML: 5 INJECTION INTRAVENOUS at 20:32

## 2025-05-20 RX ADMIN — CHOLECALCIFEROL TAB 125 MCG (5000 UNIT) 5000 UNITS: 125 TAB at 09:22

## 2025-05-20 RX ADMIN — LISINOPRIL 40 MG: 20 TABLET ORAL at 09:23

## 2025-05-20 RX ADMIN — POTASSIUM CHLORIDE 10 MEQ: 750 TABLET, EXTENDED RELEASE ORAL at 09:22

## 2025-05-20 RX ADMIN — CYCLOBENZAPRINE 10 MG: 10 TABLET, FILM COATED ORAL at 19:29

## 2025-05-20 RX ADMIN — ACETAMINOPHEN 650 MG: 325 TABLET ORAL at 13:52

## 2025-05-20 ASSESSMENT — PAIN DESCRIPTION - ONSET
ONSET: GRADUAL
ONSET: PROGRESSIVE
ONSET: GRADUAL

## 2025-05-20 ASSESSMENT — PAIN DESCRIPTION - LOCATION
LOCATION: ARM
LOCATION: NECK
LOCATION: NECK;ARM

## 2025-05-20 ASSESSMENT — PAIN DESCRIPTION - PAIN TYPE
TYPE: ACUTE PAIN;SURGICAL PAIN
TYPE: ACUTE PAIN;SURGICAL PAIN
TYPE: SURGICAL PAIN

## 2025-05-20 ASSESSMENT — PAIN DESCRIPTION - ORIENTATION
ORIENTATION: RIGHT;ANTERIOR;POSTERIOR
ORIENTATION: POSTERIOR
ORIENTATION: RIGHT;LEFT

## 2025-05-20 ASSESSMENT — PAIN - FUNCTIONAL ASSESSMENT
PAIN_FUNCTIONAL_ASSESSMENT: PREVENTS OR INTERFERES SOME ACTIVE ACTIVITIES AND ADLS
PAIN_FUNCTIONAL_ASSESSMENT: ACTIVITIES ARE NOT PREVENTED
PAIN_FUNCTIONAL_ASSESSMENT: ACTIVITIES ARE NOT PREVENTED

## 2025-05-20 ASSESSMENT — PAIN DESCRIPTION - FREQUENCY
FREQUENCY: INTERMITTENT

## 2025-05-20 ASSESSMENT — PAIN SCALES - GENERAL
PAINLEVEL_OUTOF10: 9
PAINLEVEL_OUTOF10: 0
PAINLEVEL_OUTOF10: 3
PAINLEVEL_OUTOF10: 6
PAINLEVEL_OUTOF10: 7

## 2025-05-20 ASSESSMENT — PAIN DESCRIPTION - DESCRIPTORS
DESCRIPTORS: SHARP;SPASM
DESCRIPTORS: ACHING;SPASM
DESCRIPTORS: ACHING;DISCOMFORT

## 2025-05-20 NOTE — PROGRESS NOTES
Patient continuing to work with physical therapy.  Events noted including denial by insurance for inpatient rehab (despite attempts made at [id number]). Patient with leuk- esterase (+) on recent UA with no culture result. Treated with 3days of Rocephin per hospitalist. Stable currently. Uncertain whether or not patient with true UTI but covered appropriately.  Patient is progressing but still would benefit greatly from short-term rehab or some type of inpatient rehabilitation.  Will continue to work on discharge planning.

## 2025-05-20 NOTE — PROGRESS NOTES
ORTHO PROGRESS NOTE    May 20, 2025    Admit Date: 2025  Post Op day: 6 Days Post-Op      Subjective:     Shlomo Rudolph Jr. is a patient who is now 6 Days Post-Op  and has no complaints.       Objective:     PT/OT:    Progressing    Vital Signs:    Patient Vitals for the past 8 hrs:   BP Temp Temp src Pulse Resp SpO2   25 0808 (!) 152/85 98.8 °F (37.1 °C) Oral 87 17 95 %   25 0636 -- -- -- -- 16 --   25 0251 (!) 140/89 98.2 °F (36.8 °C) Oral 88 18 95 %     Temp (24hrs), Av.2 °F (36.8 °C), Min:97.7 °F (36.5 °C), Max:98.8 °F (37.1 °C)      LAB:    Recent Labs     25  0458   HGB 15.3   WBC 11.7*          Physical Exam:    Awake and in no acute distress.  Mood and affect appropriate.  Respirations unlabored and no evidence cyanosis.  Calves nontender.  Abdomen soft and nontender.  Dressing clean/dry  No new neurologic deficit.    Assessment:      6 Days Post-Op STATUS POST Procedure(s):  ERAS C3-C5  posterior cervical fusion with allograft and instrumentation and C2-C4 laminectomy      Plan:     Anticipate discharge to: [facility]      Signed By: MARILU ZAVALETA MD

## 2025-05-20 NOTE — PROGRESS NOTES
Hospitalist Progress Note   Admit Date:  2025  4:59 AM   Name:  Shlomo Rudolph Jr.   Age:  70 y.o.  Sex:  male  :  1955   MRN:  [id number]   Room:  [facility]    Presenting/Chief Complaint: No chief complaint on file.     Reason(s) for Admission: Cervical myelopathy (HCC) [G95.9]  Myelopathic gait [R26.89]  DDD (degenerative disc disease), cervical [M50.30]     Hospital Course:   Shlomo Rudolph Jr. is a 70 y.o. male with medical history of hypertension, hyperlipidemia, vitamin D deficiency, GERD, metabolic syndrome, and tobacco use disorder who presents to the ED with known cervical myelopathy.    Since admission, orthopedic surgery performed surgery on . Physical therapy consulted and recommended inpatient rehab. Unfortunately, this was declined by peer to peer. Case management working on short-term rehab options.    Subjective & 24hr Events:   No overnight events.  Room air.  Postop day #6.  Doing well.  Ambulating well with walker with physical therapy.  Denied from inpatient rehab.  Working on short-term rehab.    Assessment & Plan:     Cervical myelopathy  - Orthopedic surgery is primary.  - Surgery performed on .  - Pain medications available.  - PT, OT recommending inpatient rehab.  Declined on peer to peer.  - Case management working on short-term rehab options.    Accelerated hypertension  - Continue home medications.  - Stop as needed clonidine.    Suspected urinary tract infection  - Urinalysis suspicious for UTI.  - Urine culture no growth.  - Completed 3 days of Rocephin.  Hold further antibiotics.    Metabolic syndrome  Prediabetes mellitus  - A1c 6.0  - Sliding scale insulin, medium dose.  - Lantus 10 units every morning.  - Hypoglycemia protocol.    Acute kidney injury  - Creatinine peaked at 1.30.  Baseline 0.9-1.0  - IV fluids for 12 hours.    Hyponatremia  - IV fluids as above.  - Continue to monitor.    GERD  - Protonix daily.    Tobacco use disorder  - Nicotine  patch as needed.    Vitamin D deficiency  - Continue vitamin D supplements.    Anticipated Discharge Arrangements:   Skilled Nursing Facility    PT/OT evals ordered?  Therapy evals ordered  Diet:  ADULT DIET; Regular  ADULT ORAL NUTRITION SUPPLEMENT; AM Snack, PM Snack; Other Oral Supplement; Ensure Surgery  VTE prophylaxis: Lovenox  Code status: Full Code      Non-peripheral Lines and Tubes (if present):          Telemetry (if present):         Hospital Problems:  Principal Problem:    Cervical myelopathy (HCC)  Active Problems:    Accelerated essential hypertension    Vitamin D deficiency    Prediabetes    Gastroesophageal reflux disease    Myelopathic gait    DDD (degenerative disc disease), cervical  Resolved Problems:    * No resolved hospital problems. *      Objective:   Patient Vitals for the past 24 hrs:   Temp Pulse Resp BP SpO2   05/20/25 0636 -- -- 16 -- --   05/20/25 0251 98.2 °F (36.8 °C) 88 18 (!) 140/89 95 %   05/19/25 2135 -- -- 16 -- --   05/19/25 1950 97.7 °F (36.5 °C) 100 16 (!) 141/86 96 %   05/19/25 1516 97.9 °F (36.6 °C) 89 13 (!) 149/74 94 %   05/19/25 0945 -- -- 16 -- --   05/19/25 0845 -- -- 18 -- --   05/19/25 0738 98.2 °F (36.8 °C) 100 14 134/79 97 %       Oxygen Therapy  SpO2: 95 %  Pulse via Oximetry: 95 beats per minute  Pulse Oximeter Device Mode: Continuous  O2 Device: None (Room air)  O2 Flow Rate (L/min): 8 L/min    Estimated body mass index is 23.96 kg/m² as calculated from the following:    Height as of this encounter: 1.82 m (5' 11.65\").    Weight as of this encounter: 79.4 kg (175 lb).    Intake/Output Summary (Last 24 hours) at 5/20/2025 0795  Last data filed at 5/20/2025 0609  Gross per 24 hour   Intake 10 ml   Output 1250 ml   Net -1240 ml         Physical Exam:     General:   Well-nourished.  No acute distress.  Room air.  Cervical collar in place.  Heart: Sinus rhythm.  Lungs: Clear to auscultation.  Abdomen: Soft and nontender.  Neuro: No loss of sensation or weakness.

## 2025-05-20 NOTE — PROGRESS NOTES
End of Shift-Night Spine Eras Patient  6 Days Post-Op   Ideal body weight: 76.8 kg (169 lb 5.2 oz)    Ambulated in olivia 0 times.     Up to chair 0 times    Lidocaine: No     PRN Pain Medications Used?: Yes    IS Used 10x/hr while awke: Yes     DRAINS? No    Najera? No    BM?  No   Passing flatus? Yes    TEDs Yes    SCDs No         Signed By: Amee Nolasco RN     May 20, 2025

## 2025-05-20 NOTE — PROGRESS NOTES
Occupational Therapy Note:    Attempted to see patient this PM for occupational therapy treatment  session. Patient declined treatment stating he is discharging this afternoon. Nothing in the chair supports this. Will follow and re-attempt as schedule permits/patient available. Thank you,    ALESSIA Healy    Rehab Caseload Tracker

## 2025-05-20 NOTE — PROGRESS NOTES
Physician Progress Note      PATIENT:               VIVIENNE JACOBS  CSN #:                  [id number]  :                       1955  ADMIT DATE:       2025 4:59 AM  DISCH DATE:  RESPONDING  PROVIDER #:        Boom Samson MD          QUERY TEXT:    Suspected UTI is documented in the medical record in progress note from    by Dr. Sotomayor.  Please clarify the relationship, if any, with the UTI and   catheter placed for surgery:    The clinical indicators include:  --Progress note from  by Dr. Sotomayor reflects \"Pyuria, suspect UTI, check   urine culture and sensitivity, start empiric ceftriaxone.\"  --Progress note from  by Dr. Samson reflects \"Suspected urinary tract   infection, urinalysis suspicious for UTI, urine culture no growth, completed 3   days of Rocephin, hold further antbiotics\"  --Urinalysis showed small leukocyte esterase and WBC   Options provided:  -- UTI related to previous urinary catheter from surgery on   -- UTI not related to indwelling urinary catheter  -- Other - I will add my own diagnosis  -- Disagree - Not applicable / Not valid  -- Disagree - Clinically unable to determine / Unknown  -- Refer to Clinical Documentation Reviewer    PROVIDER RESPONSE TEXT:    UTI is related to the previous indwelling urinary catheter from surgery on   .    Query created by: Nadia Thomas on 2025 3:57 PM      Electronically signed by:  Boom Samson MD 2025 4:05 PM

## 2025-05-20 NOTE — PROGRESS NOTES
ACUTE PHYSICAL THERAPY GOALS:   (Developed with and agreed upon by patient and/or caregiver.)    All goals reassessed on 5/20/25 re-eval and updated as needed to reflect pt current LOF. See previous note(s) for prior goal (A) level(s) and parameters.     Pt will perform all bed mobility and transfers Mod (I) in 7 therapy sessions.  Pt will ambulate 500 ft SBA with use of LRAD/no device and breaks as needed in 7 therapy sessions.  Pt will tolerate 25 minutes of standing activity with LRAD/no device in 7 therapy sessions.  Pt will perform standing dynamic balance activities with minimal postural sway in 7 therapy sessions.  Pt will tolerate multiple sets and reps of BLE exercises in 7 therapy sessions.  Pt will accurately verbalize and demonstrate spinal precautions in 7 therapy sessions.     PHYSICAL THERAPY Initial Assessment and PM  (Link to Caseload Tracking: PT Visit Days : 1  Acknowledge Orders  Time In/Out  PT Charge Capture  Rehab Caseload Tracker    Spinal precautions, log roll, and aspen collar    Shlomo Rudolph Jr. is a 70 y.o. male   PRIMARY DIAGNOSIS: Cervical myelopathy (HCC)  Cervical myelopathy (HCC) [G95.9]  Myelopathic gait [R26.89]  DDD (degenerative disc disease), cervical [M50.30]  Procedure(s) (LRB):  ERAS C3-C5  posterior cervical fusion with allograft and instrumentation and C2-C4 laminectomy (N/A)  6 Days Post-Op  Reason for Referral: Other abnormalities of gait and mobility (R26.89)  Inpatient: Payor: [employer] MEDICARE / Plan: [employer] CHOICE-[facility] MEDICARE / Product Type: *No Product type* /     ASSESSMENT:     REHAB RECOMMENDATIONS:   Recommendation to date pending progress:  Setting:  Rehab    Equipment:    To Be Determined - pt owns and uses a RW     ASSESSMENT:  Mr. Rudolph presents to PT today for re-evaluation and demonstrates excellent progress within POC as he is now performing increased activity with less assistance required relative to previous evaluation. The above goals have

## 2025-05-21 LAB
ANION GAP SERPL CALC-SCNC: 11 MMOL/L (ref 7–16)
BASOPHILS # BLD: 0.06 K/UL (ref 0–0.2)
BASOPHILS NFR BLD: 0.5 % (ref 0–2)
BUN SERPL-MCNC: 28 MG/DL (ref 8–23)
CALCIUM SERPL-MCNC: 9.4 MG/DL (ref 8.8–10.2)
CHLORIDE SERPL-SCNC: 98 MMOL/L (ref 98–107)
CO2 SERPL-SCNC: 25 MMOL/L (ref 20–29)
CREAT SERPL-MCNC: 1.29 MG/DL (ref 0.8–1.3)
DIFFERENTIAL METHOD BLD: ABNORMAL
EOSINOPHIL # BLD: 0.21 K/UL (ref 0–0.8)
EOSINOPHIL NFR BLD: 1.9 % (ref 0.5–7.8)
ERYTHROCYTE [DISTWIDTH] IN BLOOD BY AUTOMATED COUNT: 12.8 % (ref 11.9–14.6)
GLUCOSE BLD STRIP.AUTO-MCNC: 117 MG/DL (ref 65–100)
GLUCOSE BLD STRIP.AUTO-MCNC: 121 MG/DL (ref 65–100)
GLUCOSE BLD STRIP.AUTO-MCNC: 127 MG/DL (ref 65–100)
GLUCOSE BLD STRIP.AUTO-MCNC: 175 MG/DL (ref 65–100)
GLUCOSE SERPL-MCNC: 111 MG/DL (ref 70–99)
HCT VFR BLD AUTO: 45.3 % (ref 41.1–50.3)
HGB BLD-MCNC: 14.6 G/DL (ref 13.6–17.2)
IMM GRANULOCYTES # BLD AUTO: 0.06 K/UL (ref 0–0.5)
IMM GRANULOCYTES NFR BLD AUTO: 0.5 % (ref 0–5)
LYMPHOCYTES # BLD: 2.29 K/UL (ref 0.5–4.6)
LYMPHOCYTES NFR BLD: 20.3 % (ref 13–44)
MCH RBC QN AUTO: 28.6 PG (ref 26.1–32.9)
MCHC RBC AUTO-ENTMCNC: 32.2 G/DL (ref 31.4–35)
MCV RBC AUTO: 88.6 FL (ref 82–102)
MONOCYTES # BLD: 1.59 K/UL (ref 0.1–1.3)
MONOCYTES NFR BLD: 14.1 % (ref 4–12)
NEUTS SEG # BLD: 7.08 K/UL (ref 1.7–8.2)
NEUTS SEG NFR BLD: 62.7 % (ref 43–78)
NRBC # BLD: 0 K/UL (ref 0–0.2)
PLATELET # BLD AUTO: 287 K/UL (ref 150–450)
PMV BLD AUTO: 9.9 FL (ref 9.4–12.3)
POTASSIUM SERPL-SCNC: 4.2 MMOL/L (ref 3.5–5.1)
RBC # BLD AUTO: 5.11 M/UL (ref 4.23–5.6)
SERVICE CMNT-IMP: ABNORMAL
SODIUM SERPL-SCNC: 134 MMOL/L (ref 136–145)
WBC # BLD AUTO: 11.3 K/UL (ref 4.3–11.1)

## 2025-05-21 PROCEDURE — 97535 SELF CARE MNGMENT TRAINING: CPT

## 2025-05-21 PROCEDURE — 97530 THERAPEUTIC ACTIVITIES: CPT

## 2025-05-21 PROCEDURE — 97112 NEUROMUSCULAR REEDUCATION: CPT

## 2025-05-21 PROCEDURE — 6370000000 HC RX 637 (ALT 250 FOR IP): Performed by: INTERNAL MEDICINE

## 2025-05-21 PROCEDURE — 85025 COMPLETE CBC W/AUTO DIFF WBC: CPT

## 2025-05-21 PROCEDURE — 80048 BASIC METABOLIC PNL TOTAL CA: CPT

## 2025-05-21 PROCEDURE — 6370000000 HC RX 637 (ALT 250 FOR IP): Performed by: ORTHOPAEDIC SURGERY

## 2025-05-21 PROCEDURE — 82962 GLUCOSE BLOOD TEST: CPT

## 2025-05-21 PROCEDURE — 36415 COLL VENOUS BLD VENIPUNCTURE: CPT

## 2025-05-21 PROCEDURE — 97116 GAIT TRAINING THERAPY: CPT

## 2025-05-21 PROCEDURE — 1100000000 HC RM PRIVATE

## 2025-05-21 PROCEDURE — 2500000003 HC RX 250 WO HCPCS: Performed by: ORTHOPAEDIC SURGERY

## 2025-05-21 RX ORDER — OXYCODONE HYDROCHLORIDE 5 MG/1
5 TABLET ORAL EVERY 4 HOURS PRN
Qty: 20 TABLET | Refills: 0 | Status: SHIPPED | OUTPATIENT
Start: 2025-05-21 | End: 2025-05-24

## 2025-05-21 RX ORDER — METOPROLOL TARTRATE 50 MG
50 TABLET ORAL 2 TIMES DAILY
Qty: 60 TABLET | Refills: 3 | Status: SHIPPED | OUTPATIENT
Start: 2025-05-21

## 2025-05-21 RX ADMIN — ACETAMINOPHEN 650 MG: 325 TABLET ORAL at 20:47

## 2025-05-21 RX ADMIN — OXYCODONE 10 MG: 5 TABLET ORAL at 20:51

## 2025-05-21 RX ADMIN — SODIUM CHLORIDE, PRESERVATIVE FREE 10 ML: 5 INJECTION INTRAVENOUS at 08:27

## 2025-05-21 RX ADMIN — POTASSIUM CHLORIDE 10 MEQ: 750 TABLET, EXTENDED RELEASE ORAL at 08:23

## 2025-05-21 RX ADMIN — CHOLECALCIFEROL TAB 125 MCG (5000 UNIT) 5000 UNITS: 125 TAB at 08:23

## 2025-05-21 RX ADMIN — PANTOPRAZOLE SODIUM 40 MG: 40 TABLET, DELAYED RELEASE ORAL at 06:18

## 2025-05-21 RX ADMIN — LISINOPRIL 40 MG: 20 TABLET ORAL at 08:23

## 2025-05-21 RX ADMIN — METOPROLOL TARTRATE 50 MG: 50 TABLET, FILM COATED ORAL at 20:47

## 2025-05-21 RX ADMIN — SODIUM CHLORIDE, PRESERVATIVE FREE 10 ML: 5 INJECTION INTRAVENOUS at 20:47

## 2025-05-21 RX ADMIN — HYDROCHLOROTHIAZIDE 25 MG: 25 TABLET ORAL at 08:23

## 2025-05-21 RX ADMIN — ACETAMINOPHEN 650 MG: 325 TABLET ORAL at 08:23

## 2025-05-21 RX ADMIN — BISACODYL 5 MG: 5 TABLET, COATED ORAL at 08:23

## 2025-05-21 RX ADMIN — METOPROLOL TARTRATE 50 MG: 50 TABLET, FILM COATED ORAL at 08:23

## 2025-05-21 ASSESSMENT — PAIN DESCRIPTION - FREQUENCY: FREQUENCY: INTERMITTENT

## 2025-05-21 ASSESSMENT — PAIN SCALES - GENERAL
PAINLEVEL_OUTOF10: 0
PAINLEVEL_OUTOF10: 1
PAINLEVEL_OUTOF10: 8

## 2025-05-21 ASSESSMENT — PAIN DESCRIPTION - DESCRIPTORS: DESCRIPTORS: ACHING

## 2025-05-21 ASSESSMENT — PAIN DESCRIPTION - ONSET: ONSET: GRADUAL

## 2025-05-21 ASSESSMENT — PAIN DESCRIPTION - LOCATION: LOCATION: NECK

## 2025-05-21 ASSESSMENT — PAIN DESCRIPTION - ORIENTATION: ORIENTATION: ANTERIOR

## 2025-05-21 ASSESSMENT — PAIN DESCRIPTION - PAIN TYPE: TYPE: SURGICAL PAIN;ACUTE PAIN

## 2025-05-21 ASSESSMENT — PAIN - FUNCTIONAL ASSESSMENT: PAIN_FUNCTIONAL_ASSESSMENT: PREVENTS OR INTERFERES SOME ACTIVE ACTIVITIES AND ADLS

## 2025-05-21 NOTE — PROGRESS NOTES
P2P completed. Formal approval pending per medical coordinator per our phone call but physician on call stated that P2P would be written up as an approval.

## 2025-05-21 NOTE — PROGRESS NOTES
ACUTE OCCUPATIONAL THERAPY GOALS:   (Developed with and agreed upon by patient and/or caregiver.)    1. Pt will complete LB ADL I with AE as needed.   2. Pt will complete toileting I with AE as needed.   3. Pt will complete UB ADL I.  4. Pt will tolerate 25 minutes of OT treatment requiring 1-2 breaks as needed.   5. Pt will complete grooming tasks while standing at sink Mod I.  6. Pt will complete functional mobility and/or transfers via RW Mod I.   7. Pt will tolerate BUE exercises to increase strength/endurance for safe, functional transfers and/or functional mobility, and ADL participation.   8. Pt will verbalize and/or demonstrate understanding of cervical spine precautions during functional activity 100% of the time.       Timeframe: 7 visits  OCCUPATIONAL THERAPY: Daily Note AM   OT Visit Days: 3   Time In/Out  OT Charge Capture  Rehab Caseload Tracker  OT Orders    Shlomo Rudolph Jr. is a 70 y.o. male   PRIMARY DIAGNOSIS: Cervical myelopathy (HCC)  Cervical myelopathy (HCC) [G95.9]  Myelopathic gait [R26.89]  DDD (degenerative disc disease), cervical [M50.30]  Procedure(s) (LRB):  ERAS C3-C5  posterior cervical fusion with allograft and instrumentation and C2-C4 laminectomy (N/A)  7 Days Post-Op  Inpatient: Payor: [employer] MEDICARE / Plan: [employer] CHOICE-[facility] MEDICARE / Product Type: *No Product type* /     ASSESSMENT:     REHAB RECOMMENDATIONS:   Recommendation to date pending progress:  Setting:  Inpatient Rehab Facility     Equipment:    To Be Determined     ASSESSMENT:  Mr. Rudolph presents in supine upon arrival. Pt transferred to sitting with SBA and additional time. Pt completed sit to stand with CGA/min a and a rolling walker and completed functional mobility to the sink with CGA/min a. Pt stood and completed grooming, hygiene, and TB bathing with CGA. Pt completed LB dressing with education on use of sock aid to increase independence with donning socks. He was able to don his pull up with CGA.  notified    INTERDISCIPLINARY COLLABORATION:  RN/ PCT, PT/ PTA, and OT/ AARON    EDUCATION:  OT educated patient  on role of occupational therapy and plan of care, spinal precautions, and walker management and safety. Patient will need continued education at next session.          TOTAL TREATMENT DURATION AND TIME:  Time In: 0850  Time Out: 1003  Minutes: 73    ALESSIA Healy

## 2025-05-21 NOTE — PROGRESS NOTES
Hospitalist Progress Note   Admit Date:  2025  4:59 AM   Name:  Shlomo Rudolph Jr.   Age:  70 y.o.  Sex:  male  :  1955   MRN:  [id number]   Room:  [facility]    Presenting/Chief Complaint: No chief complaint on file.     Reason(s) for Admission: Cervical myelopathy (HCC) [G95.9]  Myelopathic gait [R26.89]  DDD (degenerative disc disease), cervical [M50.30]     Hospital Course:   Shlomo Rudolph Jr. is a 70 y.o. male with medical history of hypertension, hyperlipidemia, vitamin D deficiency, GERD, metabolic syndrome, and tobacco use disorder who presents to the ED with known cervical myelopathy.    Since admission, orthopedic surgery performed surgery on . Physical therapy consulted and recommended inpatient rehab. Unfortunately, this was declined by peer to peer. Case management working on short-term rehab options.    Subjective & 24hr Events:   No overnight events.  Room air.  Postop day #7.   Working on NHC/SNF.  Awaiting bed availability.    Assessment & Plan:     Cervical myelopathy  - Orthopedic surgery is primary.  - Surgery performed on .  - Pain medications available.  - PT, OT recommending inpatient rehab.  Declined on peer to peer.  - Case management working on short-term rehab options.    Accelerated hypertension  - Continue home medications.  - Stop as needed clonidine.    Suspected urinary tract infection  - Urinalysis suspicious for UTI.  - Urine culture no growth.  - Completed 3 days of Rocephin.  Hold further antibiotics.    Metabolic syndrome  Prediabetes mellitus  - A1c 6.0  - Sliding scale insulin, medium dose.  - Lantus 10 units every morning.  - Hypoglycemia protocol.    Acute kidney injury  - Creatinine peaked at 1.30.  Baseline 0.9-1.0  - IV fluids for 12 hours.    Hyponatremia  - IV fluids as above.  - Continue to monitor.    GERD  - Protonix daily.    Tobacco use disorder  - Nicotine patch as needed.    Vitamin D deficiency  - Continue vitamin D  13.6 - 17.2 g/dL    Hematocrit 45.0 41.1 - 50.3 %    MCV 84.9 82 - 102 FL    MCH 28.9 26.1 - 32.9 PG    MCHC 34.0 31.4 - 35.0 g/dL    RDW 13.0 11.9 - 14.6 %    Platelets 295 150 - 450 K/uL    MPV 9.6 9.4 - 12.3 FL    nRBC 0.00 0.0 - 0.2 K/uL    Differential Type AUTOMATED      Neutrophils % 65.5 43.0 - 78.0 %    Lymphocytes % 17.9 13.0 - 44.0 %    Monocytes % 14.3 (H) 4.0 - 12.0 %    Eosinophils % 1.5 0.5 - 7.8 %    Basophils % 0.3 0.0 - 2.0 %    Immature Granulocytes % 0.5 0.0 - 5.0 %    Neutrophils Absolute 7.64 1.70 - 8.20 K/UL    Lymphocytes Absolute 2.09 0.50 - 4.60 K/UL    Monocytes Absolute 1.67 (H) 0.10 - 1.30 K/UL    Eosinophils Absolute 0.17 0.00 - 0.80 K/UL    Basophils Absolute 0.04 0.00 - 0.20 K/UL    Immature Granulocytes Absolute 0.06 0.0 - 0.5 K/UL   POCT Glucose    Collection Time: 05/20/25  6:08 AM   Result Value Ref Range    POC Glucose 110 (H) 65 - 100 mg/dL    Performed by: Liu    POCT Glucose    Collection Time: 05/20/25 10:29 AM   Result Value Ref Range    POC Glucose 121 (H) 65 - 100 mg/dL    Performed by: Brandy    POCT Glucose    Collection Time: 05/20/25  3:28 PM   Result Value Ref Range    POC Glucose 137 (H) 65 - 100 mg/dL    Performed by: Brandy    POCT Glucose    Collection Time: 05/20/25  7:22 PM   Result Value Ref Range    POC Glucose 153 (H) 65 - 100 mg/dL    Performed by: Garrett    Basic Metabolic Panel w/ Reflex to MG    Collection Time: 05/21/25  5:26 AM   Result Value Ref Range    Sodium 134 (L) 136 - 145 mmol/L    Potassium 4.2 3.5 - 5.1 mmol/L    Chloride 98 98 - 107 mmol/L    CO2 25 20 - 29 mmol/L    Anion Gap 11 7 - 16 mmol/L    Glucose 111 (H) 70 - 99 mg/dL    BUN 28 (H) 8 - 23 MG/DL    Creatinine 1.29 0.80 - 1.30 MG/DL    Est, Glom Filt Rate 59 (L) >60 ml/min/1.73m2    Calcium 9.4 8.8 - 10.2 MG/DL   CBC with Auto Differential    Collection Time: 05/21/25  5:26 AM   Result Value Ref Range    WBC 11.3 (H) 4.3 - 11.1 K/uL    RBC

## 2025-05-21 NOTE — PROGRESS NOTES
ACUTE PHYSICAL THERAPY GOALS:   (Developed with and agreed upon by patient and/or caregiver.)  Pt will perform all bed mobility and transfers Mod (I) in 7 therapy sessions.  Pt will ambulate 500 ft SBA with use of LRAD/no device and breaks as needed in 7 therapy sessions.  Pt will tolerate 25 minutes of standing activity with LRAD/no device in 7 therapy sessions.  Pt will perform standing dynamic balance activities with minimal postural sway in 7 therapy sessions.  Pt will tolerate multiple sets and reps of BLE exercises in 7 therapy sessions.  Pt will accurately verbalize and demonstrate spinal precautions in 7 therapy sessions.     PHYSICAL THERAPY: Daily Note AM   (Link to Caseload Tracking: PT Visit Days : 2  Time In/Out PT Charge Capture  Rehab Caseload Tracker  Orders    Shlomo Rudolph Jr. is a 70 y.o. male   PRIMARY DIAGNOSIS: Cervical myelopathy (HCC)  Cervical myelopathy (HCC) [G95.9]  Myelopathic gait [R26.89]  DDD (degenerative disc disease), cervical [M50.30]  Procedure(s) (LRB):  ERAS C3-C5  posterior cervical fusion with allograft and instrumentation and C2-C4 laminectomy (N/A)  7 Days Post-Op  Inpatient: Payor: [employer] MEDICARE / Plan: [employer] CHOICE-[facility] MEDICARE / Product Type: *No Product type* /     ASSESSMENT:     REHAB RECOMMENDATIONS:   Recommendation to date pending progress:  Setting:  Inpatient Rehab Facility    Equipment:    To Be Determined     ASSESSMENT:  Mr. Rudolph was supine on contact and agreeable to work with therapy. The PT session today focused on therapeutic activities including ambulation, bed mobility, sit to stand transfer, and standing balance at the sink . Pt needed stand by assistance for standing and min a and chair follow for amb with RW. Cues for precautions and RW utilization and gt mechanics . Progress toward goals demonstrated by increasing amb distance.  Pt still functioning below their baseline with deficits in balance, endurance, and strength and will  benefit from continued physical therapy.    .     SUBJECTIVE:   Mr. Rudolph states \"What do you want me to do?\"     Social/Functional Lives With: Alone  Type of Home: Apartment  OBJECTIVE:     PAIN: VITALS / O2: PRECAUTION / LINES / DRAINS:   Pre Treatment: 0         Post Treatment: Appeared comfortable Vitals        Oxygen    None    RESTRICTIONS/PRECAUTIONS:  Restrictions/Precautions  Required Braces or Orthoses?: Yes  Position Activity Restriction  Spinal Precautions: No Bending, No Lifting, No Twisting  Required Braces or Orthoses?: Yes  Required Braces or Orthoses  Cervical: c-collar     MOBILITY:   I Mod I S SBA CGA Min Mod Max Total  NT x2 Comments:   Bed Mobility    Rolling [] [x] [] [] [] [] [] [] [] [] []    Supine to Sit [] [x] [] [] [] [] [] [] [] [] []    Scooting [] [x] [] [] [] [] [] [] [] [] []    Sit to Supine [] [x] [] [] [] [] [] [] [] [] []    Transfers    Sit to Stand [] [] [] [x] [] [] [] [] [] [] []    Bed to Chair [] [] [] [] [] [] [] [] [] [] []    Stand to Sit [] [] [] [x] [] [] [] [] [] [] []     [] [] [] [] [] [] [] [] [] [] []    I=Independent, Mod I=Modified Independent, S=Supervision, SBA=Standby Assistance, CGA=Contact Guard Assistance,   Min=Minimal Assistance, Mod=Moderate Assistance, Max=Maximal Assistance, Total=Total Assistance, NT=Not Tested    BALANCE: Good Fair+ Fair Fair- Poor NT Comments   Sitting Static [x] [] [] [] [] []    Sitting Dynamic [x] [] [] [] [] []              Standing Static [] [] [x] [] [] []    Standing Dynamic [] [] [x] [x] [] []      GAIT: I Mod I S SBA CGA Min Mod Max Total  NT x2 Comments:   Level of Assistance [] [] [] [] [x] [] [] [] [] [] []    Distance 2 x 50   feet    DME Rolling Walker    Gait Quality Decreased radha , Narrow base of support, Path deviations , Scissoring, Shuffling , and Trunk flexion    Weightbearing Status      Stairs      I=Independent, Mod I=Modified Independent, S=Supervision, SBA=Standby Assistance, CGA=Contact Guard

## 2025-05-21 NOTE — PROGRESS NOTES
ORTHO PROGRESS NOTE    May 21, 2025    Admit Date: 2025  Post Op day: 7 Days Post-Op      Subjective:     Shlomo Rudolph Jr. is a patient who is now 7 Days Post-Op  and is doing well. Patient is sleeping when entering the room. He has been working with therapy. Bed approved for [facility]/SNF yesterday but waiting on bed availability. Pain controlled. Feels like his legs are stronger. Hospitalist assisting with medical management.       Objective:     PT/OT:    Progressing    Vital Signs:    Patient Vitals for the past 8 hrs:   BP Temp Temp src Pulse Resp SpO2   25 0736 (!) 146/89 98.8 °F (37.1 °C) Oral 87 16 96 %   25 0329 (!) 162/93 99.1 °F (37.3 °C) Oral 87 16 96 %     Temp (24hrs), Av.6 °F (37 °C), Min:97.9 °F (36.6 °C), Max:99.1 °F (37.3 °C)      LAB:    Recent Labs     25  0526   HGB 14.6   WBC 11.3*          Physical Exam:    Awake and in no acute distress.  Mood and affect appropriate.  Respirations unlabored and no evidence cyanosis.  Wearing collar  No new neurologic deficit.    Moves BUE at all levels against gravity with good coordination. Able to SLR bilaterally. Ankle clonus absent/resolved on the R ankle, still 2-3 beats of clonus on the L ankle.     Assessment:      7 Days Post-Op STATUS POST Procedure(s):  ERAS C3-C4  posterior cervical fusion with allograft and instrumentation and C2-C4 laminectomy      Plan:     Anticipate discharge to: SNF - [facility]  Rx for narcotic placed in paper chart for anticipated dc.   Pain controlled.   Will plan on dc with appropriate BP meds  Encouraging to see resolution of clonus and some early improvement with gait and strength.   Will await bed availability, stable for DC from a spine standpoint.       Signed By: Behzad Webster Jr, MD

## 2025-05-21 NOTE — PROGRESS NOTES
End of Shift-Night Spine Eras Patient  7 Days Post-Op   Ideal body weight: 76.8 kg (169 lb 5.2 oz)        Ambulated in olivia many times.     Up to chair 0 times    PRN Pain Medications Used?: Yes    IS Used 10x/hr while awke: Yes     DRAINS? No    Najera? No    BM?  Yes   Passing flatus? Yes    TEDs Yes    SCDs No         Signed By: Amee Nolasco RN     May 21, 2025

## 2025-05-22 VITALS
RESPIRATION RATE: 18 BRPM | SYSTOLIC BLOOD PRESSURE: 121 MMHG | TEMPERATURE: 98.4 F | BODY MASS INDEX: 23.7 KG/M2 | HEART RATE: 88 BPM | HEIGHT: 72 IN | DIASTOLIC BLOOD PRESSURE: 70 MMHG | WEIGHT: 175 LBS | OXYGEN SATURATION: 95 %

## 2025-05-22 LAB
ANION GAP SERPL CALC-SCNC: 12 MMOL/L (ref 7–16)
BASOPHILS # BLD: 0.05 K/UL (ref 0–0.2)
BASOPHILS NFR BLD: 0.5 % (ref 0–2)
BUN SERPL-MCNC: 33 MG/DL (ref 8–23)
CALCIUM SERPL-MCNC: 9.6 MG/DL (ref 8.8–10.2)
CHLORIDE SERPL-SCNC: 97 MMOL/L (ref 98–107)
CO2 SERPL-SCNC: 24 MMOL/L (ref 20–29)
CREAT SERPL-MCNC: 1.45 MG/DL (ref 0.8–1.3)
DIFFERENTIAL METHOD BLD: ABNORMAL
EOSINOPHIL # BLD: 0.2 K/UL (ref 0–0.8)
EOSINOPHIL NFR BLD: 2 % (ref 0.5–7.8)
ERYTHROCYTE [DISTWIDTH] IN BLOOD BY AUTOMATED COUNT: 12.6 % (ref 11.9–14.6)
GLUCOSE BLD STRIP.AUTO-MCNC: 102 MG/DL (ref 65–100)
GLUCOSE BLD STRIP.AUTO-MCNC: 126 MG/DL (ref 65–100)
GLUCOSE SERPL-MCNC: 111 MG/DL (ref 70–99)
HCT VFR BLD AUTO: 44.9 % (ref 41.1–50.3)
HGB BLD-MCNC: 14.9 G/DL (ref 13.6–17.2)
IMM GRANULOCYTES # BLD AUTO: 0.05 K/UL (ref 0–0.5)
IMM GRANULOCYTES NFR BLD AUTO: 0.5 % (ref 0–5)
LYMPHOCYTES # BLD: 2.17 K/UL (ref 0.5–4.6)
LYMPHOCYTES NFR BLD: 21.2 % (ref 13–44)
MCH RBC QN AUTO: 29 PG (ref 26.1–32.9)
MCHC RBC AUTO-ENTMCNC: 33.2 G/DL (ref 31.4–35)
MCV RBC AUTO: 87.4 FL (ref 82–102)
MONOCYTES # BLD: 1.52 K/UL (ref 0.1–1.3)
MONOCYTES NFR BLD: 14.9 % (ref 4–12)
NEUTS SEG # BLD: 6.23 K/UL (ref 1.7–8.2)
NEUTS SEG NFR BLD: 60.9 % (ref 43–78)
NRBC # BLD: 0 K/UL (ref 0–0.2)
PLATELET # BLD AUTO: 322 K/UL (ref 150–450)
PMV BLD AUTO: 9.7 FL (ref 9.4–12.3)
POTASSIUM SERPL-SCNC: 4.5 MMOL/L (ref 3.5–5.1)
RBC # BLD AUTO: 5.14 M/UL (ref 4.23–5.6)
SERVICE CMNT-IMP: ABNORMAL
SERVICE CMNT-IMP: ABNORMAL
SODIUM SERPL-SCNC: 133 MMOL/L (ref 136–145)
WBC # BLD AUTO: 10.2 K/UL (ref 4.3–11.1)

## 2025-05-22 PROCEDURE — 6370000000 HC RX 637 (ALT 250 FOR IP): Performed by: INTERNAL MEDICINE

## 2025-05-22 PROCEDURE — 97530 THERAPEUTIC ACTIVITIES: CPT

## 2025-05-22 PROCEDURE — 82962 GLUCOSE BLOOD TEST: CPT

## 2025-05-22 PROCEDURE — 80048 BASIC METABOLIC PNL TOTAL CA: CPT

## 2025-05-22 PROCEDURE — 97116 GAIT TRAINING THERAPY: CPT

## 2025-05-22 PROCEDURE — 36415 COLL VENOUS BLD VENIPUNCTURE: CPT

## 2025-05-22 PROCEDURE — 6370000000 HC RX 637 (ALT 250 FOR IP): Performed by: ORTHOPAEDIC SURGERY

## 2025-05-22 PROCEDURE — 85025 COMPLETE CBC W/AUTO DIFF WBC: CPT

## 2025-05-22 RX ADMIN — POTASSIUM CHLORIDE 10 MEQ: 750 TABLET, EXTENDED RELEASE ORAL at 08:10

## 2025-05-22 RX ADMIN — LISINOPRIL 40 MG: 20 TABLET ORAL at 08:10

## 2025-05-22 RX ADMIN — METOPROLOL TARTRATE 50 MG: 50 TABLET, FILM COATED ORAL at 08:10

## 2025-05-22 RX ADMIN — ACETAMINOPHEN 650 MG: 325 TABLET ORAL at 02:06

## 2025-05-22 RX ADMIN — HYDROCHLOROTHIAZIDE 25 MG: 25 TABLET ORAL at 08:10

## 2025-05-22 RX ADMIN — ACETAMINOPHEN 650 MG: 325 TABLET ORAL at 08:10

## 2025-05-22 RX ADMIN — OXYCODONE 5 MG: 5 TABLET ORAL at 04:33

## 2025-05-22 RX ADMIN — PANTOPRAZOLE SODIUM 40 MG: 40 TABLET, DELAYED RELEASE ORAL at 05:03

## 2025-05-22 RX ADMIN — CHOLECALCIFEROL TAB 125 MCG (5000 UNIT) 5000 UNITS: 125 TAB at 08:10

## 2025-05-22 ASSESSMENT — PAIN - FUNCTIONAL ASSESSMENT: PAIN_FUNCTIONAL_ASSESSMENT: ACTIVITIES ARE NOT PREVENTED

## 2025-05-22 ASSESSMENT — PAIN DESCRIPTION - LOCATION: LOCATION: HEAD

## 2025-05-22 ASSESSMENT — PAIN DESCRIPTION - ORIENTATION: ORIENTATION: ANTERIOR

## 2025-05-22 ASSESSMENT — PAIN SCALES - GENERAL
PAINLEVEL_OUTOF10: 0
PAINLEVEL_OUTOF10: 6
PAINLEVEL_OUTOF10: 1

## 2025-05-22 ASSESSMENT — PAIN DESCRIPTION - FREQUENCY: FREQUENCY: CONTINUOUS

## 2025-05-22 ASSESSMENT — PAIN DESCRIPTION - DESCRIPTORS: DESCRIPTORS: ACHING

## 2025-05-22 ASSESSMENT — PAIN DESCRIPTION - ONSET: ONSET: GRADUAL

## 2025-05-22 ASSESSMENT — PAIN DESCRIPTION - PAIN TYPE: TYPE: ACUTE PAIN

## 2025-05-22 NOTE — CARE COORDINATION
Bed offer accepted and selected for [facility]. Expedited insurance authorization submitted via mention online portal with pending status. Attending provider updated via to be.  
CM notified by 9th floor IRF admissions liaison that patient has been accepted to 9th floor pending insurance authorization. They will submit today.   
CM notified by [facility] that patient has been approved for admission to [facility]. Attending notified via [employer].  
CM received confirmation that patient's peer to peer was denied, but insurance company can expedite authorization for STR if patient is agreeable. CM met with patient at bedside and list of in-network Medicare certified  agencies provided and reviewed. [facility] positive experience previously and he will review list for a second and third choice and notify this CM. [facility] and [facility] notified.   
CM screened chart for potential discharge needs; no CM consult received. Patient admitted for elective surgery with Dr. Webster and is post-op day one with therapy recommendations for IRF level of care. Referral sent to 9th floor IRF for review. If patient is accepted, he would require insurance authorization prior to discharge. CM will continue to follow.      05/15/25 1300   Service Assessment   Patient Orientation Alert and Oriented   Cognition Alert   History Provided By Medical Record   Primary Caregiver Self   Accompanied By/Relationship N/A   Support Systems Friends/Neighbors   Patient's Healthcare Decision Maker is: Legal Next of Kin   PCP Verified by CM Yes   Last Visit to PCP Within last 6 months   Prior Functional Level Independent in ADLs/IADLs   Current Functional Level Assistance with the following:;Bathing;Dressing;Toileting;Cooking;Housework;Shopping;Mobility   Can patient return to prior living arrangement Yes   Ability to make needs known: Good   Family able to assist with home care needs: Yes   Would you like for me to discuss the discharge plan with any other family members/significant others, and if so, who? No   Financial Resources Medicare  ([employer])   Community Resources None   CM/SW Referral Other (see comment)  (No CM consult)   Social/Functional History   Lives With Alone   Type of Home Apartment   Discharge Planning   Patient expects to be discharged to: Acute rehab       
Patient is medically ready for discharge per attending. Patient will discharge at 14:00 to room 336A at [facility] via [facility] transport. Primary RN can call report to [phone]. Patient updated at bedside and is agreeable to discharge plan. , Frances Guadalupe, updating patient's daughter over the phone on the discharge details.        05/22/25 1101   Service Assessment   Patient Orientation Alert and Oriented   Cognition Alert   Accompanied By/Relationship No family at bedside   Support Systems Friends/Neighbors;Children   Services At/After Discharge   Services At/After Discharge Skilled Nursing Facility (SNF);Transport;In ambulance   Mode of Transport at Discharge [facility] Transport Time of Discharge 1400   Confirm Follow Up Transport Family   Condition of Participation: Discharge Planning   The Plan for Transition of Care is related to the following treatment goals: Patient will participate in STR therapies in order to return to safe baseline independence in ADLs.   The Patient and/or Patient Representative was provided with a Choice of Provider? Patient   The Patient and/Or Patient Representative agree with the Discharge Plan? Yes   Freedom of Choice list was provided with basic dialogue that supports the patient's individualized plan of care/goals, treatment preferences, and shares the quality data associated with the providers?  Yes       
Peer to peer offered by insurance company. Attending notified via [employer] with the following information:    Peer to peer due by 05/22/2025 at 09:00 AM  [phone] option 5  
Update provided to patient's daughter over phone.   
Attending Attestation (For Attendings USE Only)...

## 2025-05-22 NOTE — PROGRESS NOTES
Hospitalist Progress Note   Admit Date:  2025  4:59 AM   Name:  Shlomo Rudolph Jr.   Age:  70 y.o.  Sex:  male  :  1955   MRN:  [id number]   Room:  [facility]    Presenting/Chief Complaint: No chief complaint on file.     Reason(s) for Admission: Cervical myelopathy (HCC) [G95.9]  Myelopathic gait [R26.89]  DDD (degenerative disc disease), cervical [M50.30]     Hospital Course:   Shlomo Rudolph Jr. is a 70 y.o. male with medical history of hypertension, hyperlipidemia, vitamin D deficiency, GERD, metabolic syndrome, and tobacco use disorder who presents to the ED with known cervical myelopathy.    Since admission, orthopedic surgery performed surgery on . Physical therapy consulted and recommended inpatient rehab. Unfortunately, this was declined by peer to peer. Case management working on short-term rehab options.    Subjective & 24hr Events:   No overnight events.  Room air.  Postop day #8.   Accepted to rehab at [facility].  Patient to be discharged today.    Assessment & Plan:     Cervical myelopathy  - Orthopedic surgery is primary.  - Surgery performed on .  - Pain medications available.  - PT, OT recommending inpatient rehab.  Declined on peer to peer.  - Patient accepted for STR at [facility].  Discharged on .    Accelerated hypertension  - Continue home medications.  - Stop as needed clonidine.    Suspected urinary tract infection  - Urinalysis suspicious for UTI.  - Urine culture no growth.  - Completed 3 days of Rocephin.  Hold further antibiotics.    Metabolic syndrome  Prediabetes mellitus  - A1c 6.0  - Sliding scale insulin, medium dose.  - Lantus 10 units every morning.  - Hypoglycemia protocol.    Acute kidney injury  - Creatinine peaked at 1.30.  Baseline 0.9-1.0  - IV fluids for 12 hours.    Hyponatremia  - IV fluids as above.  - Continue to monitor.    GERD  - Protonix daily.    Tobacco use disorder  - Nicotine patch as needed.    Vitamin D deficiency  - Continue vitamin D

## 2025-05-22 NOTE — PROGRESS NOTES
Attempted to call report to [facility] for pt going to room 336A. No answer, no voicemail available to leave name and callback number.

## 2025-05-22 NOTE — DISCHARGE SUMMARY
[facility]   Discharge Summary         Patient ID:  Shlomo Rudolph Jr.  [id number]  70 y.o.  1955    Admit date: 5/14/2025  Discharge date and time:    Admitting Physician: Behzad Webster Jr., MD  Surgeon: Same    Hospital Course    Admission Diagnoses: Pre op diagnosis: Cervical myelopathy (HCC) [G95.9]  Myelopathic gait [R26.89]  DDD (degenerative disc disease), cervical [M50.30]  Prior to surgery the patient was seen for consultation in the office or hospital and a complete history and physical was taken as it pertained to their condition.  Patient was found to have significant worsening of his cervical myelopathy.    Discharge Diagnoses: Cervical myelopathy (HCC). Chronic and Acute medical problems addressed during this hospital stay by consulting physicians include: Hypertension, debility, questionable UTI covered appropriately with Rocephin    They underwent Procedure(s) (LRB):  ERAS C3-C4  posterior cervical fusion with allograft and instrumentation and C2-C4 laminectomy (N/A) for this.       Principle Problem: Cervical myelopathy (HCC).     Other Chronic and Acute Medical Issues: managed by the hospitalist during admission included: Principal Problem:    Cervical myelopathy (HCC)  Active Problems:    Accelerated essential hypertension    Vitamin D deficiency    Prediabetes    Gastroesophageal reflux disease    Myelopathic gait    DDD (degenerative disc disease), cervical  Resolved Problems:    * No resolved hospital problems. *                               Perioperative Antibiotics:  Prior to surgery Ancef 1 to 2 mg was given depending on patient's weight and allergies.  If the patient was allergic to Ancef or MRSA positive  the patient was given Vancomycin and Cleocin        Hospital Medications:   Current Facility-Administered Medications   Medication Dose Route Frequency    enoxaparin (LOVENOX) injection 40 mg  40 mg SubCUTAneous Q24H    hydroCHLOROthiazide (HYDRODIURIL)

## 2025-05-22 NOTE — PROGRESS NOTES
TRANSFER - IN REPORT:    Verbal report received from KARLEY Borjas on Shlomo Rudolph .  being received from 735 for routine progression of patient care      Report consisted of patient's Situation, Background, Assessment and   Recommendations(SBAR).     Information from the following report(s) Nurse Handoff Report was reviewed with the receiving nurse.    Opportunity for questions and clarification was provided.      Assessment completed upon patient's arrival to unit and care assumed.

## 2025-05-22 NOTE — PROGRESS NOTES
ACUTE PHYSICAL THERAPY GOALS:   (Developed with and agreed upon by patient and/or caregiver.)  Pt will perform all bed mobility and transfers Mod (I) in 7 therapy sessions.  Pt will ambulate 500 ft SBA with use of LRAD/no device and breaks as needed in 7 therapy sessions.  Pt will tolerate 25 minutes of standing activity with LRAD/no device in 7 therapy sessions.  Pt will perform standing dynamic balance activities with minimal postural sway in 7 therapy sessions.  Pt will tolerate multiple sets and reps of BLE exercises in 7 therapy sessions.  Pt will accurately verbalize and demonstrate spinal precautions in 7 therapy sessions.     PHYSICAL THERAPY: Daily Note AM   (Link to Caseload Tracking: PT Visit Days : 3  Time In/Out PT Charge Capture  Rehab Caseload Tracker  Orders    Shlomo Rudolph Jr. is a 70 y.o. male   PRIMARY DIAGNOSIS: Cervical myelopathy (HCC)  Cervical myelopathy (HCC) [G95.9]  Myelopathic gait [R26.89]  DDD (degenerative disc disease), cervical [M50.30]  Procedure(s) (LRB):  ERAS C3-C5  posterior cervical fusion with allograft and instrumentation and C2-C4 laminectomy (N/A)  8 Days Post-Op  Inpatient: Payor: [employer] MEDICARE / Plan: [employer] CHOICE-PPO MEDICARE / Product Type: *No Product type* /     ASSESSMENT:     REHAB RECOMMENDATIONS:   Recommendation to date pending progress:  Setting:  Inpatient Rehab Facility    Equipment:    To Be Determined     ASSESSMENT:  Mr. Rudolph was supine on contact and agreeable to work with therapy. The PT session today focused on therapeutic activities including ambulation, bed mobility, sit to stand transfer, and toilet transfers and reviewing precautions.   Pt needed stand by assistance for standing and amb with RW with a chair follow. Cues for precautions and RW utilization and gt mechanics . Progress toward goals demonstrated by increasing amb distance.  Pt still functioning below their baseline with deficits in balance, endurance, and strength and  Assistance,   Min=Minimal Assistance, Mod=Moderate Assistance, Max=Maximal Assistance, Total=Total Assistance, NT=Not Tested    PLAN:   FREQUENCY AND DURATION: 5 times/week for duration of hospital stay or until stated goals are met, whichever comes first.    TREATMENT:   TREATMENT:   Therapeutic Activity (23 Minutes): Therapeutic activity included Supine to Sit, Sit to Supine, Scooting, Transfer Training, Sitting balance , and Standing balance to improve functional Activity tolerance, Balance, Mobility, and Strength.  Gait Training (19 Minutes): Gait training for 2 x 50 feet utilizing Rolling Walker. Patient required Manual and Verbal cueing to improve Assistive Device Utilization, Dynamic Standing Balance, and Gait Mechanics.     TREATMENT GRID:  N/A    AFTER TREATMENT PRECAUTIONS: Bed, Bed/Chair Locked, Needs within reach, and RN notified    INTERDISCIPLINARY COLLABORATION:  RN/ PCT, PT/ PTA, and Rehab Attendant    EDUCATION:    Educated patient and/or family/caregiver on the following: Assistive Device Indications/Utilization/Safety, Brace Indication/Wear, Log Roll Technique, and Spinal Precautions    TIME IN/OUT:  Time In: 0908  Time Out: 0950  Minutes: 42    LONDON DEUTSCH PTA

## 2025-05-22 NOTE — PROGRESS NOTES
TRANSFER - OUT REPORT:    Verbal report given to KARLEY Carrera on Shlomo Rudolph Jr.  being transferred to D/C [facility]  for routine progression of patient care       Report consisted of patient's Situation, Background, Assessment and   Recommendations(SBAR).     Information from the following report(s) Nurse Handoff Report was reviewed with the receiving nurse.             Opportunity for questions and clarification was provided.      Patient transported with:  Tech

## 2025-05-29 ENCOUNTER — OFFICE VISIT (OUTPATIENT)
Age: 70
End: 2025-05-29

## 2025-05-29 DIAGNOSIS — Z98.1 STATUS POST CERVICAL SPINAL FUSION: Primary | ICD-10-CM

## 2025-05-29 PROCEDURE — 99024 POSTOP FOLLOW-UP VISIT: CPT | Performed by: ORTHOPAEDIC SURGERY

## 2025-06-02 NOTE — PROGRESS NOTES
Name: Shlomo Rudolph Jr.  YOB: 1955  Gender: male  MRN: [id number]  Age: 70 y.o.    Chief Complaint: Postop    Assessment/Plan at Previous Visit: post op    History of Present Illness  The patient is a 70-year-old male here for his first postoperative appointment after C2-4 posterior cervical laminectomy and C3-4 fusion for cervical myelopathy. He was discharged to a rehab facility after his surgery and presents today with his daughter.    He reports an improvement in leg strength, allowing him to stand for longer periods compared to his pre-surgical state.  Overall he feels much better and encouraged with his progress.  He expresses a desire to return home and continue his therapy there, with the assistance of an aide. He has been residing at [facility], where he has been receiving physical therapy.  Prior to surgery, he had been managing his daily activities independently, including cooking, bathing, and cleaning.    Of note, there was a questionable UTI while the patient was admitted with a leukocyte Estrace and WBC positive UA which was appropriately covered with Rocephin.    PAST SURGICAL HISTORY:  C2-4 posterior cervical laminectomy and C3-4 fusion for cervical myelopathy on 05/2025      SOCIAL HISTORY  Tobacco: The patient smokes cigarettes.  (Counseled on cessation completely or at least significantly decreasing while healing process is ongoing)               Medications:       Current Outpatient Medications:     metoprolol tartrate (LOPRESSOR) 50 MG tablet, Take 1 tablet by mouth 2 times daily, Disp: 60 tablet, Rfl: 3    omeprazole (PRILOSEC) 40 MG delayed release capsule, Take 1 capsule by mouth every morning (before breakfast), Disp: 90 capsule, Rfl: 1    lisinopril (PRINIVIL;ZESTRIL) 20 MG tablet, Take 1 tablet by mouth daily, Disp: 90 tablet, Rfl: 1    Vitamin D3 125 MCG (5000 UT) TABS tablet, Take 1 tablet by mouth daily 1 tablet daily, Disp: 90 tablet, Rfl: 1    Blood

## 2025-06-10 ENCOUNTER — TELEPHONE (OUTPATIENT)
Dept: ORTHOPEDIC SURGERY | Age: 70
End: 2025-06-10

## 2025-06-10 NOTE — TELEPHONE ENCOUNTER
Returned call and reached his voice mail. I left a message stating that I could call and discuss this with his daughter as she has not been placed on his HIPAA to discuss his medical with. I will look into the rehab and aid and see what I can find out about this situation

## 2025-06-10 NOTE — TELEPHONE ENCOUNTER
Home health referral was sent to [facility], strength training, gait training, balance training, safety training, functional mobility, and home assessment due to fall risk  Patient will be home 06/10/25 and will need home PT to start after    Someone from [facility] should be contacting them to schedule an appointment.

## 2025-06-11 ENCOUNTER — TELEPHONE (OUTPATIENT)
Dept: ORTHOPEDIC SURGERY | Age: 70
End: 2025-06-11

## 2025-06-16 DIAGNOSIS — Z98.1 STATUS POST CERVICAL SPINAL FUSION: Primary | ICD-10-CM

## 2025-07-01 ENCOUNTER — OFFICE VISIT (OUTPATIENT)
Age: 70
End: 2025-07-01

## 2025-07-01 DIAGNOSIS — Z98.1 STATUS POST CERVICAL SPINAL FUSION: Primary | ICD-10-CM

## 2025-07-01 PROCEDURE — 99024 POSTOP FOLLOW-UP VISIT: CPT | Performed by: ORTHOPAEDIC SURGERY

## 2025-07-01 RX ORDER — DOXYCYCLINE 100 MG/1
CAPSULE ORAL
COMMUNITY
Start: 2025-06-13

## 2025-07-01 RX ORDER — ALBUTEROL SULFATE 90 UG/1
INHALANT RESPIRATORY (INHALATION)
COMMUNITY
Start: 2025-06-13

## 2025-07-01 RX ORDER — INHALER, ASSIST DEVICES
SPACER (EA) MISCELLANEOUS
COMMUNITY
Start: 2025-06-13

## 2025-07-01 RX ORDER — SENNOSIDES AND DOCUSATE SODIUM 8.6; 5 MG/1; MG/1
TABLET ORAL
COMMUNITY
Start: 2025-05-22

## 2025-07-01 RX ORDER — BENZONATATE 200 MG/1
CAPSULE ORAL
COMMUNITY
Start: 2025-06-13

## 2025-07-05 NOTE — PROGRESS NOTES
Name: Shlomo Rudolph Jr.  YOB: 1955  Gender: male  MRN: [id number]  Age: 70 y.o.    Chief Complaint: post op    Assessment/Plan at Previous Visit: post op    History of Present Illness  The patient is a 70-year-old male here today for a 6-week follow-up after C2-4 posterior cervical laminectomy and C3-4 posterior cervical fusion.    He was last seen on 05/29/2025. He reports satisfactory progress in his recovery, with no significant issues in his neck or legs. He feels like his legs are stronger.  He continues to engage in physical therapy. He does continue to smoke. He uses a rolling walker to get around.     He reports being diagnosed with pneumonia recently after presenting to an urgent care facility with a cough and URI symptoms. He tells me this was treated with oral antibiotics and albuterol. He states that he is going to call his PCP to ensure a follow up appointment regarding this issue. He denies that it ever caused any shortness of breath.     PAST SURGICAL HISTORY:  C2-4 posterior cervical laminectomy and C3-4 posterior cervical fusion.                   Medications:       Current Outpatient Medications:     albuterol sulfate HFA (PROVENTIL;VENTOLIN;PROAIR) 108 (90 Base) MCG/ACT inhaler, , Disp: , Rfl:     benzonatate (TESSALON) 200 MG capsule, TAKE 1 CAPSULE BY MOUTH THREE TIMES DAILY FOR 10 DAYS FOR COUGH, Disp: , Rfl:     doxycycline hyclate (VIBRAMYCIN) 100 MG capsule, TAKE 1 CAPSULE BY MOUTH TWICE DAILY FOR 7 DAYS, Disp: , Rfl:     SENNA-PLUS 8.6-50 MG per tablet, , Disp: , Rfl:     Spacer/Aero-Holding Chambers (PROCHAMBER VHC) EVETTE, USE AS DIRECTED, Disp: , Rfl:     metoprolol tartrate (LOPRESSOR) 50 MG tablet, Take 1 tablet by mouth 2 times daily, Disp: 60 tablet, Rfl: 3    omeprazole (PRILOSEC) 40 MG delayed release capsule, Take 1 capsule by mouth every morning (before breakfast), Disp: 90 capsule, Rfl: 1    lisinopril (PRINIVIL;ZESTRIL) 20 MG tablet, Take 1 tablet by mouth

## 2025-07-07 ENCOUNTER — TELEPHONE (OUTPATIENT)
Dept: ORTHOPEDIC SURGERY | Age: 70
End: 2025-07-07

## 2025-08-20 ENCOUNTER — OFFICE VISIT (OUTPATIENT)
Dept: INTERNAL MEDICINE CLINIC | Facility: CLINIC | Age: 70
End: 2025-08-20

## 2025-08-20 VITALS
HEART RATE: 88 BPM | OXYGEN SATURATION: 97 % | BODY MASS INDEX: 23.79 KG/M2 | HEIGHT: 71 IN | WEIGHT: 169.9 LBS | DIASTOLIC BLOOD PRESSURE: 64 MMHG | TEMPERATURE: 97.3 F | SYSTOLIC BLOOD PRESSURE: 120 MMHG

## 2025-08-20 DIAGNOSIS — N18.31 STAGE 3A CHRONIC KIDNEY DISEASE (HCC): ICD-10-CM

## 2025-08-20 DIAGNOSIS — K21.9 GASTROESOPHAGEAL REFLUX DISEASE, UNSPECIFIED WHETHER ESOPHAGITIS PRESENT: ICD-10-CM

## 2025-08-20 DIAGNOSIS — I10 PRIMARY HYPERTENSION: ICD-10-CM

## 2025-08-20 DIAGNOSIS — E55.9 VITAMIN D DEFICIENCY: ICD-10-CM

## 2025-08-20 DIAGNOSIS — Z12.11 SCREENING FOR COLON CANCER: ICD-10-CM

## 2025-08-20 DIAGNOSIS — F17.210 CONTINUOUS DEPENDENCE ON CIGARETTE SMOKING: ICD-10-CM

## 2025-08-20 DIAGNOSIS — R73.03 PREDIABETES: ICD-10-CM

## 2025-08-20 DIAGNOSIS — Z00.00 MEDICARE ANNUAL WELLNESS VISIT, SUBSEQUENT: Primary | ICD-10-CM

## 2025-08-20 DIAGNOSIS — Z12.5 ENCOUNTER FOR SCREENING FOR MALIGNANT NEOPLASM OF PROSTATE: ICD-10-CM

## 2025-08-20 DIAGNOSIS — E78.00 PURE HYPERCHOLESTEROLEMIA: ICD-10-CM

## 2025-08-20 LAB
ALBUMIN SERPL-MCNC: 3.5 G/DL (ref 3.2–4.6)
ALBUMIN/GLOB SERPL: 0.9 (ref 1–1.9)
ALP SERPL-CCNC: 89 U/L (ref 40–129)
ALT SERPL-CCNC: 19 U/L (ref 8–55)
ANION GAP SERPL CALC-SCNC: 12 MMOL/L (ref 7–16)
AST SERPL-CCNC: 18 U/L (ref 15–37)
BILIRUB SERPL-MCNC: 0.7 MG/DL (ref 0–1.2)
BUN SERPL-MCNC: 14 MG/DL (ref 8–23)
CALCIUM SERPL-MCNC: 9.6 MG/DL (ref 8.8–10.2)
CHLORIDE SERPL-SCNC: 106 MMOL/L (ref 98–107)
CO2 SERPL-SCNC: 22 MMOL/L (ref 20–29)
CREAT SERPL-MCNC: 1.19 MG/DL (ref 0.8–1.3)
GLOBULIN SER CALC-MCNC: 3.8 G/DL (ref 2.3–3.5)
GLUCOSE SERPL-MCNC: 92 MG/DL (ref 70–99)
POTASSIUM SERPL-SCNC: 4.2 MMOL/L (ref 3.5–5.1)
PROT SERPL-MCNC: 7.3 G/DL (ref 6.3–8.2)
PSA SERPL-MCNC: 1 NG/ML (ref 0–4)
SODIUM SERPL-SCNC: 140 MMOL/L (ref 136–145)

## 2025-08-20 RX ORDER — METOPROLOL TARTRATE 50 MG
50 TABLET ORAL 2 TIMES DAILY
Qty: 180 TABLET | Refills: 1 | Status: SHIPPED | OUTPATIENT
Start: 2025-08-20

## 2025-08-20 RX ORDER — LISINOPRIL 20 MG/1
20 TABLET ORAL DAILY
Qty: 90 TABLET | Refills: 1 | Status: SHIPPED | OUTPATIENT
Start: 2025-08-20

## 2025-08-20 RX ORDER — OMEPRAZOLE 40 MG/1
40 CAPSULE, DELAYED RELEASE ORAL
Qty: 90 CAPSULE | Refills: 1 | Status: SHIPPED | OUTPATIENT
Start: 2025-08-20

## 2025-08-20 ASSESSMENT — ENCOUNTER SYMPTOMS
SHORTNESS OF BREATH: 0
ABDOMINAL PAIN: 0
BACK PAIN: 1
COUGH: 0

## 2025-08-20 ASSESSMENT — PATIENT HEALTH QUESTIONNAIRE - PHQ9
1. LITTLE INTEREST OR PLEASURE IN DOING THINGS: NOT AT ALL
SUM OF ALL RESPONSES TO PHQ QUESTIONS 1-9: 0
2. FEELING DOWN, DEPRESSED OR HOPELESS: NOT AT ALL

## 2025-08-20 ASSESSMENT — LIFESTYLE VARIABLES
HOW MANY STANDARD DRINKS CONTAINING ALCOHOL DO YOU HAVE ON A TYPICAL DAY: 3 OR 4
HOW OFTEN DO YOU HAVE A DRINK CONTAINING ALCOHOL: 2-4 TIMES A MONTH

## (undated) DEVICE — AGENT HEMOSTATIC SURGIFLOW MATRIX KIT W/THROMBIN

## (undated) DEVICE — SOLUTION IRRIG 1000ML 09% SOD CHL USP PIC PLAS CONTAINER

## (undated) DEVICE — ELECTRD CUT LP ANG 27FR BRN

## (undated) DEVICE — CONNECTOR TBNG OD5-7MM O2 END DISP

## (undated) DEVICE — SUTURE ABSORBABLE L 18 IN SZ 2-0 NDL L 26 MM TRICLOSAN

## (undated) DEVICE — BIT DRLL SPNL 2.3MM

## (undated) DEVICE — CUTTING LOOP, 27FR. ANGLED, STERILE: Brand: N.A.

## (undated) DEVICE — SPONGE,NEURO,0.5"X0.5",XR,STRL,10/PK: Brand: MEDLINE

## (undated) DEVICE — SUTURE STRATAFIX SYMMETRIC SZ 1 L18IN ABSRB VLT CT1 L36CM SXPP1A404

## (undated) DEVICE — MOUTHPIECE ENDOSCP 20X27MM --

## (undated) DEVICE — REM POLYHESIVE ADULT PATIENT RETURN ELECTRODE: Brand: VALLEYLAB

## (undated) DEVICE — SOLUTION IRRIG 1000ML H2O PIC PLAS SHATTERPROOF CONTAINER

## (undated) DEVICE — TRAY,URINE METER,100% SILICONE,16FR10ML: Brand: MEDLINE

## (undated) DEVICE — Y-TYPE TUR/BLADDER IRRIGATION SET, REGULATING CLAMP

## (undated) DEVICE — PACK PROCEDURE SURG TRANSURETHRAL RESECT OF PROST CDS

## (undated) DEVICE — LASER SURG FIBER 1000 MH RND TIP HOLM SMARTSCOPE DISP

## (undated) DEVICE — FORCEPS BPLR L210MM TIP L1.5 WRK L105MM STR BAYNT INSUL DISP

## (undated) DEVICE — CANNULA NSL ORAL AD FOR CAPNOFLEX CO2 O2 AIRLFE

## (undated) DEVICE — BAG DRNGE 4000ML CONT IRRIG ROUNDED TEARDROP SHP DISP

## (undated) DEVICE — SOL IRR GLYC 1.5 % 3000ML --

## (undated) DEVICE — TRAY PREP DRY W/ PREM GLV 2 APPL 6 SPNG 2 UNDPD 1 OVERWRAP

## (undated) DEVICE — CATHETER URETH 24FR BLLN 30CC STD LTX 3 W TWO OPP DRNGE EYE

## (undated) DEVICE — UNIVERSAL DRAPES: Brand: MEDLINE INDUSTRIES, INC.

## (undated) DEVICE — ABSORBENT, WATERPROOF, BACTERIA PROOF FILM DRESSING: Brand: OPSITE POST OP 20X10CM CTN 20

## (undated) DEVICE — CONTAINER SPEC FRMLN 120ML --

## (undated) DEVICE — DEVICE SECUREMENT AD W/ TRICOT ANCHR PD FOR F LTX SIL CATH

## (undated) DEVICE — CYSTO/BLADDER IRRIGATION SET, REGULATING CLAMP

## (undated) DEVICE — INTENDED FOR TISSUE SEPARATION, AND OTHER PROCEDURES THAT REQUIRE A SHARP SURGICAL BLADE TO PUNCTURE OR CUT.: Brand: BARD-PARKER SAFETY BLADES SIZE 11, STERILE

## (undated) DEVICE — AIRLIFE™ OXYGEN TUBING 7 FEET (2.1 M) CRUSH RESISTANT OXYGEN TUBING, VINYL TIPPED: Brand: AIRLIFE™

## (undated) DEVICE — SYR IRR CATH TIP LR ADPT 70ML -- CONVERT TO ITEM 363120

## (undated) DEVICE — JACKSON TABLE POSITIONER KIT: Brand: MEDLINE INDUSTRIES, INC.

## (undated) DEVICE — GLOVE ORANGE PI 8   MSG9080

## (undated) DEVICE — SUTURE VICRYL SZ 1 L27IN ABSRB UD L36MM CP-1 1/2 CIR REV CUT J268H

## (undated) DEVICE — 3M™ TEGADERM™ TRANSPARENT FILM DRESSING FRAME STYLE, 1626W, 4 IN X 4-3/4 IN (10 CM X 12 CM), 50/CT 4CT/CASE: Brand: 3M™ TEGADERM™

## (undated) DEVICE — ABSORBENT, WATERPROOF, BACTERIA PROOF FILM DRESSING: Brand: OPSITE POST OP 9.5X8.5CM CTN 20

## (undated) DEVICE — POSTERIOR LAMI VANPLT-LUCAS: Brand: MEDLINE INDUSTRIES, INC.

## (undated) DEVICE — LIQUIBAND RAPID ADHESIVE 36/CS 0.8ML: Brand: MEDLINE

## (undated) DEVICE — BIPOLAR SEALER 23-112-1 AQM 6.0: Brand: AQUAMANTYS ®

## (undated) DEVICE — BIT DRL 3.5 MM TAPR DISP

## (undated) DEVICE — SYSTEM SKIN CLSR 22CM DERMBND PRINEO

## (undated) DEVICE — BLLN KT O RING ENDOSCP US --

## (undated) DEVICE — GOWN,REINFORCED,POLY,AURORA,XXLARGE,STR: Brand: MEDLINE